# Patient Record
Sex: FEMALE | Race: ASIAN
[De-identification: names, ages, dates, MRNs, and addresses within clinical notes are randomized per-mention and may not be internally consistent; named-entity substitution may affect disease eponyms.]

---

## 2019-03-09 ENCOUNTER — HOSPITAL ENCOUNTER (EMERGENCY)
Dept: HOSPITAL 8 - ED | Age: 54
Discharge: HOME | End: 2019-03-09
Payer: SELF-PAY

## 2019-03-09 VITALS — WEIGHT: 156.75 LBS | HEIGHT: 65 IN | BODY MASS INDEX: 26.12 KG/M2

## 2019-03-09 VITALS — SYSTOLIC BLOOD PRESSURE: 153 MMHG | DIASTOLIC BLOOD PRESSURE: 80 MMHG

## 2019-03-09 DIAGNOSIS — R05: Primary | ICD-10-CM

## 2019-03-09 PROCEDURE — 99283 EMERGENCY DEPT VISIT LOW MDM: CPT

## 2019-03-09 PROCEDURE — 71046 X-RAY EXAM CHEST 2 VIEWS: CPT

## 2019-06-30 ENCOUNTER — HOSPITAL ENCOUNTER (EMERGENCY)
Dept: HOSPITAL 8 - ED | Age: 54
Discharge: HOME | End: 2019-06-30
Payer: COMMERCIAL

## 2019-06-30 VITALS — DIASTOLIC BLOOD PRESSURE: 77 MMHG | SYSTOLIC BLOOD PRESSURE: 153 MMHG

## 2019-06-30 VITALS — HEIGHT: 65 IN | BODY MASS INDEX: 24.24 KG/M2 | WEIGHT: 145.51 LBS

## 2019-06-30 DIAGNOSIS — R11.10: ICD-10-CM

## 2019-06-30 DIAGNOSIS — H81.10: ICD-10-CM

## 2019-06-30 DIAGNOSIS — H81.399: Primary | ICD-10-CM

## 2019-06-30 DIAGNOSIS — R05: ICD-10-CM

## 2019-06-30 LAB
ALBUMIN SERPL-MCNC: 3.6 G/DL (ref 3.4–5)
ALP SERPL-CCNC: 70 U/L (ref 45–117)
ALT SERPL-CCNC: 17 U/L (ref 12–78)
ANION GAP SERPL CALC-SCNC: 6 MMOL/L (ref 5–15)
BASOPHILS # BLD AUTO: 0.01 X10^3/UL (ref 0–0.1)
BASOPHILS NFR BLD AUTO: 0 % (ref 0–1)
BILIRUB SERPL-MCNC: 0.3 MG/DL (ref 0.2–1)
CALCIUM SERPL-MCNC: 8.3 MG/DL (ref 8.5–10.1)
CHLORIDE SERPL-SCNC: 111 MMOL/L (ref 98–107)
CREAT SERPL-MCNC: 0.91 MG/DL (ref 0.55–1.02)
EOSINOPHIL # BLD AUTO: 0.13 X10^3/UL (ref 0–0.4)
EOSINOPHIL NFR BLD AUTO: 3 % (ref 1–7)
ERYTHROCYTE [DISTWIDTH] IN BLOOD BY AUTOMATED COUNT: 13.5 % (ref 9.6–15.2)
LYMPHOCYTES # BLD AUTO: 1.44 X10^3/UL (ref 1–3.4)
LYMPHOCYTES NFR BLD AUTO: 37 % (ref 22–44)
MCH RBC QN AUTO: 29.3 PG (ref 27–34.8)
MCHC RBC AUTO-ENTMCNC: 33.6 G/DL (ref 32.4–35.8)
MCV RBC AUTO: 87.1 FL (ref 80–100)
MD: NO
MONOCYTES # BLD AUTO: 0.16 X10^3/UL (ref 0.2–0.8)
MONOCYTES NFR BLD AUTO: 4 % (ref 2–9)
NEUTROPHILS # BLD AUTO: 2.2 X10^3/UL (ref 1.8–6.8)
NEUTROPHILS NFR BLD AUTO: 56 % (ref 42–75)
PLATELET # BLD AUTO: 240 X10^3/UL (ref 130–400)
PMV BLD AUTO: 8 FL (ref 7.4–10.4)
PROT SERPL-MCNC: 7.9 G/DL (ref 6.4–8.2)
RBC # BLD AUTO: 5.45 X10^6/UL (ref 3.82–5.3)

## 2019-06-30 PROCEDURE — 85025 COMPLETE CBC W/AUTO DIFF WBC: CPT

## 2019-06-30 PROCEDURE — 93005 ELECTROCARDIOGRAM TRACING: CPT

## 2019-06-30 PROCEDURE — 71045 X-RAY EXAM CHEST 1 VIEW: CPT

## 2019-06-30 PROCEDURE — 99284 EMERGENCY DEPT VISIT MOD MDM: CPT

## 2019-06-30 PROCEDURE — 36415 COLL VENOUS BLD VENIPUNCTURE: CPT

## 2019-06-30 PROCEDURE — 80053 COMPREHEN METABOLIC PANEL: CPT

## 2019-06-30 PROCEDURE — 83690 ASSAY OF LIPASE: CPT

## 2019-06-30 NOTE — NUR
REPORT FROM SHEA HELM.

YULIA PRETTY. C/.O NASAL CONGESTION, PRODUCTIVE COUGH FOR ONE WEEK NOW HA AT WORK 
TODAY

APPEARS WELL/CLEAR LUNGS

UPDATED ON ESTIMATE

## 2019-06-30 NOTE — NUR
With reassessment patient reports "i feel way less dizzy." Able to rapidly move 
head/body with limited dizzinessLab at bedside to draw labs

## 2019-06-30 NOTE — NUR
Medicated per emar

 Up to restroom to void-no difficulty

Mild wet cough noted

 Vitals updated-wnl

## 2020-05-26 ENCOUNTER — HOSPITAL ENCOUNTER (OUTPATIENT)
Dept: LAB | Facility: MEDICAL CENTER | Age: 55
End: 2020-05-26
Attending: PHYSICIAN ASSISTANT
Payer: COMMERCIAL

## 2020-05-26 LAB
ALBUMIN SERPL BCP-MCNC: 4.3 G/DL (ref 3.2–4.9)
ALBUMIN/GLOB SERPL: 1.2 G/DL
ALP SERPL-CCNC: 77 U/L (ref 30–99)
ALT SERPL-CCNC: 12 U/L (ref 2–50)
ANION GAP SERPL CALC-SCNC: 11 MMOL/L (ref 7–16)
AST SERPL-CCNC: 14 U/L (ref 12–45)
BASOPHILS # BLD AUTO: 0.6 % (ref 0–1.8)
BASOPHILS # BLD: 0.04 K/UL (ref 0–0.12)
BILIRUB SERPL-MCNC: 0.5 MG/DL (ref 0.1–1.5)
BUN SERPL-MCNC: 12 MG/DL (ref 8–22)
CALCIUM SERPL-MCNC: 9 MG/DL (ref 8.5–10.5)
CHLORIDE SERPL-SCNC: 104 MMOL/L (ref 96–112)
CHOLEST SERPL-MCNC: 215 MG/DL (ref 100–199)
CO2 SERPL-SCNC: 21 MMOL/L (ref 20–33)
CREAT SERPL-MCNC: 0.87 MG/DL (ref 0.5–1.4)
EOSINOPHIL # BLD AUTO: 0.33 K/UL (ref 0–0.51)
EOSINOPHIL NFR BLD: 5.1 % (ref 0–6.9)
ERYTHROCYTE [DISTWIDTH] IN BLOOD BY AUTOMATED COUNT: 41.1 FL (ref 35.9–50)
FASTING STATUS PATIENT QL REPORTED: NORMAL
GLOBULIN SER CALC-MCNC: 3.5 G/DL (ref 1.9–3.5)
GLUCOSE SERPL-MCNC: 94 MG/DL (ref 65–99)
HCT VFR BLD AUTO: 47 % (ref 37–47)
HDLC SERPL-MCNC: 34 MG/DL
HGB BLD-MCNC: 15.4 G/DL (ref 12–16)
IMM GRANULOCYTES # BLD AUTO: 0.02 K/UL (ref 0–0.11)
IMM GRANULOCYTES NFR BLD AUTO: 0.3 % (ref 0–0.9)
LDLC SERPL CALC-MCNC: 120 MG/DL
LYMPHOCYTES # BLD AUTO: 2.22 K/UL (ref 1–4.8)
LYMPHOCYTES NFR BLD: 34.4 % (ref 22–41)
MCH RBC QN AUTO: 28.5 PG (ref 27–33)
MCHC RBC AUTO-ENTMCNC: 32.8 G/DL (ref 33.6–35)
MCV RBC AUTO: 86.9 FL (ref 81.4–97.8)
MONOCYTES # BLD AUTO: 0.41 K/UL (ref 0–0.85)
MONOCYTES NFR BLD AUTO: 6.4 % (ref 0–13.4)
NEUTROPHILS # BLD AUTO: 3.43 K/UL (ref 2–7.15)
NEUTROPHILS NFR BLD: 53.2 % (ref 44–72)
NRBC # BLD AUTO: 0 K/UL
NRBC BLD-RTO: 0 /100 WBC
PLATELET # BLD AUTO: 299 K/UL (ref 164–446)
PMV BLD AUTO: 9.7 FL (ref 9–12.9)
POTASSIUM SERPL-SCNC: 4 MMOL/L (ref 3.6–5.5)
PROT SERPL-MCNC: 7.8 G/DL (ref 6–8.2)
RBC # BLD AUTO: 5.41 M/UL (ref 4.2–5.4)
SODIUM SERPL-SCNC: 136 MMOL/L (ref 135–145)
T4 FREE SERPL-MCNC: 1.07 NG/DL (ref 0.93–1.7)
TRIGL SERPL-MCNC: 305 MG/DL (ref 0–149)
TSH SERPL DL<=0.005 MIU/L-ACNC: 2.12 UIU/ML (ref 0.38–5.33)
WBC # BLD AUTO: 6.5 K/UL (ref 4.8–10.8)

## 2020-05-26 PROCEDURE — 36415 COLL VENOUS BLD VENIPUNCTURE: CPT

## 2020-05-26 PROCEDURE — 85025 COMPLETE CBC W/AUTO DIFF WBC: CPT

## 2020-05-26 PROCEDURE — 84443 ASSAY THYROID STIM HORMONE: CPT

## 2020-05-26 PROCEDURE — 80053 COMPREHEN METABOLIC PANEL: CPT

## 2020-05-26 PROCEDURE — 84439 ASSAY OF FREE THYROXINE: CPT

## 2020-05-26 PROCEDURE — 83036 HEMOGLOBIN GLYCOSYLATED A1C: CPT

## 2020-05-26 PROCEDURE — 80061 LIPID PANEL: CPT

## 2020-05-27 LAB
EST. AVERAGE GLUCOSE BLD GHB EST-MCNC: 123 MG/DL
HBA1C MFR BLD: 5.9 % (ref 0–5.6)

## 2020-05-28 ENCOUNTER — OFFICE VISIT (OUTPATIENT)
Dept: CARDIOLOGY | Facility: MEDICAL CENTER | Age: 55
End: 2020-05-28
Payer: COMMERCIAL

## 2020-05-28 VITALS
DIASTOLIC BLOOD PRESSURE: 56 MMHG | WEIGHT: 149 LBS | HEIGHT: 65 IN | HEART RATE: 76 BPM | SYSTOLIC BLOOD PRESSURE: 123 MMHG | BODY MASS INDEX: 24.83 KG/M2 | OXYGEN SATURATION: 99 %

## 2020-05-28 DIAGNOSIS — I10 ESSENTIAL HYPERTENSION, BENIGN: ICD-10-CM

## 2020-05-28 DIAGNOSIS — R00.2 PALPITATIONS: ICD-10-CM

## 2020-05-28 DIAGNOSIS — I45.10 RBBB: ICD-10-CM

## 2020-05-28 LAB — EKG IMPRESSION: NORMAL

## 2020-05-28 PROCEDURE — 99204 OFFICE O/P NEW MOD 45 MIN: CPT | Performed by: INTERNAL MEDICINE

## 2020-05-28 PROCEDURE — 93000 ELECTROCARDIOGRAM COMPLETE: CPT | Performed by: INTERNAL MEDICINE

## 2020-05-28 RX ORDER — TELMISARTAN 40 MG/1
40 TABLET ORAL DAILY
Qty: 90 TAB | Refills: 3 | Status: SHIPPED | OUTPATIENT
Start: 2020-05-28 | End: 2021-04-16 | Stop reason: SDUPTHER

## 2020-05-28 RX ORDER — AMLODIPINE BESYLATE 5 MG/1
5 TABLET ORAL DAILY
Qty: 90 TAB | Refills: 3 | Status: SHIPPED | OUTPATIENT
Start: 2020-05-28 | End: 2020-05-28 | Stop reason: SDUPTHER

## 2020-05-28 RX ORDER — TELMISARTAN 40 MG/1
40 TABLET ORAL DAILY
Qty: 90 TAB | Refills: 3 | Status: SHIPPED | OUTPATIENT
Start: 2020-05-28 | End: 2020-05-28 | Stop reason: SDUPTHER

## 2020-05-28 RX ORDER — TELMISARTAN AND AMLODIPINE 5; 40 MG/1; MG/1
TABLET ORAL DAILY
COMMUNITY
End: 2020-05-28

## 2020-05-28 RX ORDER — AMLODIPINE BESYLATE 5 MG/1
5 TABLET ORAL DAILY
Qty: 90 TAB | Refills: 3 | Status: SHIPPED | OUTPATIENT
Start: 2020-05-28 | End: 2021-04-16 | Stop reason: SDUPTHER

## 2020-05-28 ASSESSMENT — ENCOUNTER SYMPTOMS
GASTROINTESTINAL NEGATIVE: 1
MUSCULOSKELETAL NEGATIVE: 1
DOUBLE VISION: 0
CONSTITUTIONAL NEGATIVE: 1
DIZZINESS: 0
BLURRED VISION: 0
PALPITATIONS: 1
BRUISES/BLEEDS EASILY: 0
SHORTNESS OF BREATH: 0
INSOMNIA: 0
NERVOUS/ANXIOUS: 0

## 2020-05-28 ASSESSMENT — FIBROSIS 4 INDEX: FIB4 SCORE: 0.72

## 2020-05-28 NOTE — PROGRESS NOTES
Chief Complaint   Patient presents with   • Abnormal EKG     new patient   Palpitations    Subjective:   Jennifer Saleh is a 54 y.o. female who presents today to establish care for her hypertension for evaluation of abnormal EKG and palpitations    She is seen as a new patient.  She is poor historian and does not speaks good English.  Her sister mostly answered the questions for her.  She has history of HTN and heart murmur.  She was hospitalized in [facility] in 2018 for a week for headache  and was found to have HTN.   She has been taking telmisartan amlodipine (40/5 mg) daily  Her BP has been in the range  She is able to work in the kitchen.    Described palpitation as a fast beat  Denies recent changes in cardiac symptoms.    Denies frequent strep throat as a child but probably has allergy/ frequent URI symptoms.      Past Medical History:   Diagnosis Date   • Heart murmur    • Hypertension    • Palpitations      History reviewed. No pertinent surgical history.  Family History   Problem Relation Age of Onset   • Heart Disease Mother    • Heart Disease Father    • Heart Disease, need heart transplant  One of 8 sibling Brother      Social History     Socioeconomic History   • Marital status:      Spouse name: Not on file   • Number of children: Not on file   • Years of education: Not on file   • Highest education level: Not on file   Occupational History   • Not on file   Social Needs   • Financial resource strain: Not on file   • Food insecurity     Worry: Not on file     Inability: Not on file   • Transportation needs     Medical: Not on file     Non-medical: Not on file   Tobacco Use   • Smoking status: Never Smoker   • Smokeless tobacco: Never Used   Substance and Sexual Activity   • Alcohol use: Not on file   • Drug use: Not on file   • Sexual activity: Not on file   Lifestyle   • Physical activity     Days per week: Not on file     Minutes per session: Not on file   • Stress: Not on file  "  Relationships   • Social connections     Talks on phone: Not on file     Gets together: Not on file     Attends Caodaism service: Not on file     Active member of club or organization: Not on file     Attends meetings of clubs or organizations: Not on file     Relationship status: Not on file   • Intimate partner violence     Fear of current or ex partner: Not on file     Emotionally abused: Not on file     Physically abused: Not on file     Forced sexual activity: Not on file   Other Topics Concern   • Not on file   Social History Narrative   • Not on file     Not on File  Outpatient Encounter Medications as of 5/28/2020   Medication Sig Dispense Refill   • amLODIPine (NORVASC) 5 MG Tab Take 1 Tab by mouth every day. 90 Tab 3   • telmisartan (MICARDIS) 40 MG Tab Take 1 Tab by mouth every day. 90 Tab 3   • [DISCONTINUED] Telmisartan-amLODIPine 40-5 MG Tab Take  by mouth every day.     • [DISCONTINUED] telmisartan (MICARDIS) 40 MG Tab Take 1 Tab by mouth every day. 90 Tab 3   • [DISCONTINUED] amLODIPine (NORVASC) 5 MG Tab Take 1 Tab by mouth every day. 90 Tab 3     No facility-administered encounter medications on file as of 5/28/2020.      Review of Systems   Constitutional: Negative.    HENT: Positive for congestion.    Eyes: Negative for blurred vision and double vision.   Respiratory: Negative for shortness of breath.    Cardiovascular: Positive for palpitations. Negative for chest pain.        Fast, infrequent   Gastrointestinal: Negative.    Genitourinary: Negative.    Musculoskeletal: Negative.    Neurological: Negative for dizziness.   Endo/Heme/Allergies: Negative for environmental allergies. Does not bruise/bleed easily.   Psychiatric/Behavioral: The patient is not nervous/anxious and does not have insomnia.    All other systems reviewed and are negative.       Objective:   /56 (BP Location: Left arm, Patient Position: Sitting)   Pulse 76   Ht 1.651 m (5' 5\")   Wt 67.6 kg (149 lb)   SpO2 99%   " BMI 24.79 kg/m²     Physical Exam   Constitutional: She is oriented to person, place, and time. She appears well-developed and well-nourished.   Neck: No JVD present.   Cardiovascular: Normal rate and regular rhythm. Exam reveals no gallop.   No murmur heard.  Pulmonary/Chest: Effort normal and breath sounds normal. No respiratory distress. She has no wheezes. She has no rales.   Abdominal: Soft. She exhibits no distension. There is no abdominal tenderness.   Musculoskeletal:         General: No edema.   Neurological: She is alert and oriented to person, place, and time.   Skin: Skin is warm and dry. No erythema.   Psychiatric: She has a normal mood and affect. Her behavior is normal.     Labs 2 days ago CMP normal   Hb A1c 5.9    EKG today by my review showed sinus rhythm with right bundle branch block    Assessment:     1. Palpitations  EKG    EC-ECHOCARDIOGRAM COMPLETE W/ CONT   2. Essential hypertension, benign  EC-ECHOCARDIOGRAM COMPLETE W/ CONT   3. RBBB  EC-ECHOCARDIOGRAM COMPLETE W/ CONT       Medical Decision Making:  Today's Assessment / Status / Plan:     Palpitation is infrequent may be some type of SVT.  Right bundle branch block may be due to pulmonary disease.  Will obtain echocardiography to rule out diagnosed left heart disease particularly mitral valve issue.  Blood pressure is well controlled on current medication we will refill her medication for her.  If her co-pay is high, will switch telmisartan to losartan.  See her back in about 6 months for follow-up.  We will keep you posted about our findings and further recommendations as they become available. Please also do not hesitate to call for any questions.  Thank you kindly for allowing me to participate in the care of this patient.

## 2020-06-09 ENCOUNTER — TELEPHONE (OUTPATIENT)
Dept: CARDIOLOGY | Facility: MEDICAL CENTER | Age: 55
End: 2020-06-09

## 2020-06-09 ENCOUNTER — HOSPITAL ENCOUNTER (OUTPATIENT)
Dept: CARDIOLOGY | Facility: MEDICAL CENTER | Age: 55
End: 2020-06-09
Attending: INTERNAL MEDICINE
Payer: COMMERCIAL

## 2020-06-09 DIAGNOSIS — R00.2 PALPITATIONS: ICD-10-CM

## 2020-06-09 DIAGNOSIS — I10 ESSENTIAL HYPERTENSION, BENIGN: ICD-10-CM

## 2020-06-09 DIAGNOSIS — I45.10 RBBB: ICD-10-CM

## 2020-06-09 LAB
LV EJECT FRACT  99904: 65
LV EJECT FRACT MOD 2C 99903: 47.62
LV EJECT FRACT MOD 4C 99902: 56.87
LV EJECT FRACT MOD BP 99901: 54.48

## 2020-06-09 PROCEDURE — 93306 TTE W/DOPPLER COMPLETE: CPT

## 2020-06-09 PROCEDURE — 93306 TTE W/DOPPLER COMPLETE: CPT | Mod: 26 | Performed by: INTERNAL MEDICINE

## 2020-06-09 NOTE — TELEPHONE ENCOUNTER
----- Message from Sammie Zabala L.P.N. sent at 6/9/2020 11:33 AM PDT -----    ----- Message -----  From: González Welch M.D.  Sent: 6/9/2020  11:27 AM PDT  To: ANNETTE Cortes NL  ----- Message -----  From: Mar Jacobs R.N.  Sent: 6/9/2020   9:09 AM PDT  To: González Welch M.D.    FV scheduled with you 11/19/2020, thank you!

## 2021-04-16 ENCOUNTER — TELEPHONE (OUTPATIENT)
Dept: CARDIOLOGY | Facility: MEDICAL CENTER | Age: 56
End: 2021-04-16

## 2021-04-16 DIAGNOSIS — R00.2 PALPITATIONS: ICD-10-CM

## 2021-04-16 DIAGNOSIS — Z00.00 ANNUAL PHYSICAL EXAM: ICD-10-CM

## 2021-04-16 DIAGNOSIS — I10 ESSENTIAL HYPERTENSION, BENIGN: ICD-10-CM

## 2021-04-16 RX ORDER — TELMISARTAN 40 MG/1
40 TABLET ORAL DAILY
Qty: 90 TABLET | Refills: 0 | Status: SHIPPED | OUTPATIENT
Start: 2021-04-16 | End: 2021-07-20 | Stop reason: SDUPTHER

## 2021-04-16 RX ORDER — AMLODIPINE BESYLATE 5 MG/1
5 TABLET ORAL DAILY
Qty: 90 TABLET | Refills: 0 | Status: SHIPPED | OUTPATIENT
Start: 2021-04-16 | End: 2021-07-20 | Stop reason: SDUPTHER

## 2021-04-16 NOTE — TELEPHONE ENCOUNTER
CR    Pt needs 90 day refills of amlodipine and telmisartan sent to [facility] on [address]. Pt is out of medication.     Thank you

## 2021-05-14 ENCOUNTER — HOSPITAL ENCOUNTER (OUTPATIENT)
Dept: LAB | Facility: MEDICAL CENTER | Age: 56
End: 2021-05-14
Attending: STUDENT IN AN ORGANIZED HEALTH CARE EDUCATION/TRAINING PROGRAM
Payer: COMMERCIAL

## 2021-05-14 LAB
ALBUMIN SERPL BCP-MCNC: 4.2 G/DL (ref 3.2–4.9)
ALBUMIN/GLOB SERPL: 1.1 G/DL
ALP SERPL-CCNC: 75 U/L (ref 30–99)
ALT SERPL-CCNC: 14 U/L (ref 2–50)
ANION GAP SERPL CALC-SCNC: 9 MMOL/L (ref 7–16)
AST SERPL-CCNC: 15 U/L (ref 12–45)
BASOPHILS # BLD AUTO: 0.6 % (ref 0–1.8)
BASOPHILS # BLD: 0.04 K/UL (ref 0–0.12)
BILIRUB SERPL-MCNC: 0.3 MG/DL (ref 0.1–1.5)
BUN SERPL-MCNC: 13 MG/DL (ref 8–22)
CALCIUM SERPL-MCNC: 9 MG/DL (ref 8.5–10.5)
CHLORIDE SERPL-SCNC: 105 MMOL/L (ref 96–112)
CHOLEST SERPL-MCNC: 238 MG/DL (ref 100–199)
CO2 SERPL-SCNC: 24 MMOL/L (ref 20–33)
CREAT SERPL-MCNC: 0.92 MG/DL (ref 0.5–1.4)
EOSINOPHIL # BLD AUTO: 0.25 K/UL (ref 0–0.51)
EOSINOPHIL NFR BLD: 3.8 % (ref 0–6.9)
ERYTHROCYTE [DISTWIDTH] IN BLOOD BY AUTOMATED COUNT: 41.1 FL (ref 35.9–50)
EST. AVERAGE GLUCOSE BLD GHB EST-MCNC: 134 MG/DL
FASTING STATUS PATIENT QL REPORTED: NORMAL
GLOBULIN SER CALC-MCNC: 3.8 G/DL (ref 1.9–3.5)
GLUCOSE SERPL-MCNC: 109 MG/DL (ref 65–99)
HBA1C MFR BLD: 6.3 % (ref 4–5.6)
HBV CORE AB SERPL QL IA: NONREACTIVE
HBV SURFACE AB SERPL IA-ACNC: <3.5 MIU/ML (ref 0–10)
HCT VFR BLD AUTO: 45.3 % (ref 37–47)
HDLC SERPL-MCNC: 42 MG/DL
HGB BLD-MCNC: 15.1 G/DL (ref 12–16)
HIV 1+2 AB+HIV1 P24 AG SERPL QL IA: NORMAL
IMM GRANULOCYTES # BLD AUTO: 0.02 K/UL (ref 0–0.11)
IMM GRANULOCYTES NFR BLD AUTO: 0.3 % (ref 0–0.9)
LDLC SERPL CALC-MCNC: 167 MG/DL
LYMPHOCYTES # BLD AUTO: 2.33 K/UL (ref 1–4.8)
LYMPHOCYTES NFR BLD: 35.4 % (ref 22–41)
MCH RBC QN AUTO: 28 PG (ref 27–33)
MCHC RBC AUTO-ENTMCNC: 33.3 G/DL (ref 33.6–35)
MCV RBC AUTO: 83.9 FL (ref 81.4–97.8)
MONOCYTES # BLD AUTO: 0.44 K/UL (ref 0–0.85)
MONOCYTES NFR BLD AUTO: 6.7 % (ref 0–13.4)
NEUTROPHILS # BLD AUTO: 3.51 K/UL (ref 2–7.15)
NEUTROPHILS NFR BLD: 53.2 % (ref 44–72)
NRBC # BLD AUTO: 0 K/UL
NRBC BLD-RTO: 0 /100 WBC
PLATELET # BLD AUTO: 324 K/UL (ref 164–446)
PMV BLD AUTO: 9.2 FL (ref 9–12.9)
POTASSIUM SERPL-SCNC: 4.3 MMOL/L (ref 3.6–5.5)
PROT SERPL-MCNC: 8 G/DL (ref 6–8.2)
RBC # BLD AUTO: 5.4 M/UL (ref 4.2–5.4)
SODIUM SERPL-SCNC: 138 MMOL/L (ref 135–145)
TRIGL SERPL-MCNC: 147 MG/DL (ref 0–149)
TSH SERPL DL<=0.005 MIU/L-ACNC: 1.69 UIU/ML (ref 0.38–5.33)
WBC # BLD AUTO: 6.6 K/UL (ref 4.8–10.8)

## 2021-05-14 PROCEDURE — 86803 HEPATITIS C AB TEST: CPT

## 2021-05-14 PROCEDURE — 84443 ASSAY THYROID STIM HORMONE: CPT

## 2021-05-14 PROCEDURE — 87340 HEPATITIS B SURFACE AG IA: CPT

## 2021-05-14 PROCEDURE — 83036 HEMOGLOBIN GLYCOSYLATED A1C: CPT

## 2021-05-14 PROCEDURE — 86706 HEP B SURFACE ANTIBODY: CPT

## 2021-05-14 PROCEDURE — 86694 HERPES SIMPLEX NES ANTBDY: CPT

## 2021-05-14 PROCEDURE — 80061 LIPID PANEL: CPT

## 2021-05-14 PROCEDURE — 36415 COLL VENOUS BLD VENIPUNCTURE: CPT

## 2021-05-14 PROCEDURE — 87389 HIV-1 AG W/HIV-1&-2 AB AG IA: CPT

## 2021-05-14 PROCEDURE — 86780 TREPONEMA PALLIDUM: CPT

## 2021-05-14 PROCEDURE — 85025 COMPLETE CBC W/AUTO DIFF WBC: CPT

## 2021-05-14 PROCEDURE — 80053 COMPREHEN METABOLIC PANEL: CPT

## 2021-05-14 PROCEDURE — 82306 VITAMIN D 25 HYDROXY: CPT

## 2021-05-14 PROCEDURE — 86704 HEP B CORE ANTIBODY TOTAL: CPT

## 2021-05-15 LAB
HBV SURFACE AG SER QL: NORMAL
HCV AB SER QL: NORMAL
TREPONEMA PALLIDUM IGG+IGM AB [PRESENCE] IN SERUM OR PLASMA BY IMMUNOASSAY: NORMAL

## 2021-05-16 LAB — 25(OH)D3 SERPL-MCNC: 14 NG/ML (ref 30–80)

## 2021-05-18 LAB — HSV1+2 IGG SER IA-ACNC: 0.97 IV

## 2021-07-20 ENCOUNTER — OFFICE VISIT (OUTPATIENT)
Dept: CARDIOLOGY | Facility: MEDICAL CENTER | Age: 56
End: 2021-07-20
Payer: COMMERCIAL

## 2021-07-20 VITALS
HEIGHT: 65 IN | BODY MASS INDEX: 22.81 KG/M2 | SYSTOLIC BLOOD PRESSURE: 182 MMHG | WEIGHT: 136.9 LBS | DIASTOLIC BLOOD PRESSURE: 90 MMHG | OXYGEN SATURATION: 98 % | HEART RATE: 77 BPM | RESPIRATION RATE: 12 BRPM

## 2021-07-20 DIAGNOSIS — I45.10 RBBB (RIGHT BUNDLE BRANCH BLOCK): ICD-10-CM

## 2021-07-20 DIAGNOSIS — R94.31 NONSPECIFIC ABNORMAL ELECTROCARDIOGRAM (ECG) (EKG): ICD-10-CM

## 2021-07-20 DIAGNOSIS — E78.5 DYSLIPIDEMIA: ICD-10-CM

## 2021-07-20 DIAGNOSIS — I10 ESSENTIAL HYPERTENSION, BENIGN: ICD-10-CM

## 2021-07-20 PROBLEM — R00.2 PALPITATIONS: Status: RESOLVED | Noted: 2020-05-28 | Resolved: 2021-07-20

## 2021-07-20 PROCEDURE — 99214 OFFICE O/P EST MOD 30 MIN: CPT | Performed by: INTERNAL MEDICINE

## 2021-07-20 RX ORDER — FLUTICASONE PROPIONATE 50 MCG
SPRAY, SUSPENSION (ML) NASAL
COMMUNITY
Start: 2021-06-03 | End: 2021-07-20

## 2021-07-20 RX ORDER — METFORMIN HYDROCHLORIDE 750 MG/1
750 TABLET, EXTENDED RELEASE ORAL DAILY
Qty: 30 TABLET | Refills: 0 | Status: SHIPPED | OUTPATIENT
Start: 2021-07-20 | End: 2022-05-13

## 2021-07-20 RX ORDER — METFORMIN HYDROCHLORIDE 750 MG/1
TABLET, EXTENDED RELEASE ORAL
COMMUNITY
Start: 2021-06-03 | End: 2021-07-20 | Stop reason: SDUPTHER

## 2021-07-20 RX ORDER — TELMISARTAN 40 MG/1
40 TABLET ORAL DAILY
Qty: 90 TABLET | Refills: 3 | Status: SHIPPED | OUTPATIENT
Start: 2021-07-20 | End: 2021-09-21 | Stop reason: SDUPTHER

## 2021-07-20 RX ORDER — ATORVASTATIN CALCIUM 40 MG/1
TABLET, FILM COATED ORAL
COMMUNITY
Start: 2021-06-03 | End: 2021-07-20 | Stop reason: SDUPTHER

## 2021-07-20 RX ORDER — ATORVASTATIN CALCIUM 40 MG/1
40 TABLET, FILM COATED ORAL DAILY
Qty: 90 TABLET | Refills: 3 | Status: SHIPPED | OUTPATIENT
Start: 2021-07-20 | End: 2022-02-23 | Stop reason: SDUPTHER

## 2021-07-20 RX ORDER — AMLODIPINE BESYLATE 5 MG/1
5 TABLET ORAL DAILY
Qty: 90 TABLET | Refills: 3 | Status: SHIPPED | OUTPATIENT
Start: 2021-07-20 | End: 2021-09-21 | Stop reason: SDUPTHER

## 2021-07-20 ASSESSMENT — ENCOUNTER SYMPTOMS
NEUROLOGICAL NEGATIVE: 1
DOUBLE VISION: 0
VOMITING: 0
ABDOMINAL PAIN: 0
FEVER: 0
NERVOUS/ANXIOUS: 0
CLAUDICATION: 0
BLURRED VISION: 0
RESPIRATORY NEGATIVE: 1
CONSTITUTIONAL NEGATIVE: 1
MYALGIAS: 0
GASTROINTESTINAL NEGATIVE: 1
CHILLS: 0
MUSCULOSKELETAL NEGATIVE: 1
HEADACHES: 0
FOCAL WEAKNESS: 0
PALPITATIONS: 0
COUGH: 0
SHORTNESS OF BREATH: 0
WEAKNESS: 0
WEIGHT LOSS: 0
PSYCHIATRIC NEGATIVE: 1
CARDIOVASCULAR NEGATIVE: 1
DEPRESSION: 0
BRUISES/BLEEDS EASILY: 0
EYES NEGATIVE: 1
DIZZINESS: 0
NAUSEA: 0

## 2021-07-20 ASSESSMENT — FIBROSIS 4 INDEX: FIB4 SCORE: 0.67

## 2021-07-20 NOTE — PROGRESS NOTES
Chief Complaint   Patient presents with   • Hypertension     F/V Dx: Essential hypertension, benign   • Palpitations       Subjective:   Rosario Imelda Apellanes Enerio is a 55 y.o. female who presents today for follow up of hypertension and hyperlipidemia, abnormal EKG    Since the patient's last visit on 05/28/20 with González Levi, she has been doing well clinically. She denies chest pain, shortness of breath, palpitations, nausea/vomiting or diaphoresis. She has been out of her medications for 2 months.    Past Medical History:   Diagnosis Date   • Heart murmur    • Hypertension    • Palpitations      History reviewed. No pertinent surgical history.  Family History   Problem Relation Age of Onset   • Heart Disease Mother    • Heart Disease Father    • Heart Disease Brother      Social History     Socioeconomic History   • Marital status:      Spouse name: Not on file   • Number of children: Not on file   • Years of education: Not on file   • Highest education level: Not on file   Occupational History   • Not on file   Tobacco Use   • Smoking status: Never Smoker   • Smokeless tobacco: Never Used   Substance and Sexual Activity   • Alcohol use: Never   • Drug use: Not on file   • Sexual activity: Not on file   Other Topics Concern   • Not on file   Social History Narrative   • Not on file     Social Determinants of Health     Financial Resource Strain:    • Difficulty of Paying Living Expenses:    Food Insecurity:    • Worried About Running Out of Food in the Last Year:    • Ran Out of Food in the Last Year:    Transportation Needs:    • Lack of Transportation (Medical):    • Lack of Transportation (Non-Medical):    Physical Activity:    • Days of Exercise per Week:    • Minutes of Exercise per Session:    Stress:    • Feeling of Stress :    Social Connections:    • Frequency of Communication with Friends and Family:    • Frequency of Social Gatherings with Friends and Family:    • Attends  Protestant Services:    • Active Member of Clubs or Organizations:    • Attends Club or Organization Meetings:    • Marital Status:    Intimate Partner Violence:    • Fear of Current or Ex-Partner:    • Emotionally Abused:    • Physically Abused:    • Sexually Abused:      Not on File     (Medications reviewed.)  Outpatient Encounter Medications as of 7/20/2021   Medication Sig Dispense Refill   • atorvastatin (LIPITOR) 40 MG Tab Take 1 tablet by mouth every day. Take 1 tablet by mouth every night at bedtime 90 tablet 3   • telmisartan (MICARDIS) 40 MG Tab Take 1 tablet by mouth every day. NEEDS APPT 90 tablet 3   • amLODIPine (NORVASC) 5 MG Tab Take 1 tablet by mouth every day. NEEDS APPT 90 tablet 3   • metFORMIN ER (GLUCOPHAGE XR) 750 MG TABLET SR 24 HR Take 1 tablet by mouth every day. 30 tablet 0   • [DISCONTINUED] atorvastatin (LIPITOR) 40 MG Tab Take  by mouth. Take 1 tablet by mouth every night at bedtime     • [DISCONTINUED] fluticasone (FLONASE) 50 MCG/ACT nasal spray INHALE 2 SPRAYS INTO EACH NOSTRIL IN THE MORNING (Patient not taking: Reported on 7/20/2021)     • [DISCONTINUED] metFORMIN ER (GLUCOPHAGE XR) 750 MG TABLET SR 24 HR TAKE 1 TABLET BY MOUTH TWICE A DAY     • [DISCONTINUED] telmisartan (MICARDIS) 40 MG Tab Take 1 tablet by mouth every day. NEEDS APPT 90 tablet 0   • [DISCONTINUED] amLODIPine (NORVASC) 5 MG Tab Take 1 tablet by mouth every day. NEEDS APPT 90 tablet 0     No facility-administered encounter medications on file as of 7/20/2021.     Review of Systems   Constitutional: Negative.  Negative for chills, fever, malaise/fatigue and weight loss.   HENT: Negative.  Negative for hearing loss.    Eyes: Negative.  Negative for blurred vision and double vision.   Respiratory: Negative.  Negative for cough and shortness of breath.    Cardiovascular: Negative.  Negative for chest pain, palpitations, claudication and leg swelling.   Gastrointestinal: Negative.  Negative for abdominal pain, nausea  "and vomiting.   Genitourinary: Negative.  Negative for dysuria and urgency.   Musculoskeletal: Negative.  Negative for joint pain and myalgias.   Skin: Negative.  Negative for itching and rash.   Neurological: Negative.  Negative for dizziness, focal weakness, weakness and headaches.   Endo/Heme/Allergies: Negative.  Does not bruise/bleed easily.   Psychiatric/Behavioral: Negative.  Negative for depression. The patient is not nervous/anxious.         Objective:   BP (!) 182/90 (BP Location: Left arm, Patient Position: Sitting, BP Cuff Size: Adult)   Pulse 77   Resp 12   Ht 1.651 m (5' 5\")   Wt 62.1 kg (136 lb 14.4 oz)   SpO2 98%   BMI 22.78 kg/m²     Physical Exam   Constitutional: She is oriented to person, place, and time. She appears well-developed.   HENT:   Head: Normocephalic and atraumatic.   Neck: No JVD present.   Cardiovascular: Normal rate, regular rhythm and normal heart sounds.   Pulmonary/Chest: Effort normal and breath sounds normal.   Abdominal: Soft. Bowel sounds are normal.   No hepatosplenomegaly.   Musculoskeletal:         General: Normal range of motion.   Lymphadenopathy:     She has no cervical adenopathy.   Neurological: She is alert and oriented to person, place, and time.   Skin: Skin is warm and dry.     CARDIAC STUDIES/PROCEDURES:    ECHOCARDIOGRAM CONCLUSIONS (06/09/20)  No prior study is available for comparison.   Normal transthoracic echocardiogram.   (study result reviewed)    EKG performed on (05/28/20) was reviewed: EKG personally interpreted shows sinus rhythm with right bundle branch block.    Laboratory results of (05/14/21) were reviewed. Cholesterol profile of 238/147/42/167 mg/dL noted.    Assessment:     1. Essential hypertension, benign     2. Dyslipidemia     3. Nonspecific abnormal electrocardiogram (ECG) (EKG)     4. RBBB (right bundle branch block)         Medical Decision Making:  Today's Assessment / Status / Plan:     1. Hypertension: Blood pressure has been " high. We will restart telmisartan and amlodipine and reassess the blood pressure.  2. Hyperlipidemia: We will restart statin therapy.  3. Abnormal EKG with right bundle branch block: The EKG is abnormal as described above. We will perform myocardial perfusion imaging study.  4. Diabetes mellitus: We refilled her metformin once and defer to her primary care physician.

## 2021-08-24 ENCOUNTER — HOSPITAL ENCOUNTER (OUTPATIENT)
Dept: LAB | Facility: MEDICAL CENTER | Age: 56
End: 2021-08-24
Attending: STUDENT IN AN ORGANIZED HEALTH CARE EDUCATION/TRAINING PROGRAM
Payer: COMMERCIAL

## 2021-08-24 LAB
ALBUMIN SERPL BCP-MCNC: 4.2 G/DL (ref 3.2–4.9)
ALBUMIN/GLOB SERPL: 1.2 G/DL
ALP SERPL-CCNC: 68 U/L (ref 30–99)
ALT SERPL-CCNC: 12 U/L (ref 2–50)
ANION GAP SERPL CALC-SCNC: 11 MMOL/L (ref 7–16)
AST SERPL-CCNC: 16 U/L (ref 12–45)
BILIRUB SERPL-MCNC: 0.3 MG/DL (ref 0.1–1.5)
BUN SERPL-MCNC: 13 MG/DL (ref 8–22)
CALCIUM SERPL-MCNC: 9.2 MG/DL (ref 8.5–10.5)
CHLORIDE SERPL-SCNC: 103 MMOL/L (ref 96–112)
CHOLEST SERPL-MCNC: 144 MG/DL (ref 100–199)
CO2 SERPL-SCNC: 25 MMOL/L (ref 20–33)
CREAT SERPL-MCNC: 0.82 MG/DL (ref 0.5–1.4)
EST. AVERAGE GLUCOSE BLD GHB EST-MCNC: 128 MG/DL
GLOBULIN SER CALC-MCNC: 3.4 G/DL (ref 1.9–3.5)
GLUCOSE SERPL-MCNC: 80 MG/DL (ref 65–99)
HBA1C MFR BLD: 6.1 % (ref 4–5.6)
HDLC SERPL-MCNC: 44 MG/DL
LDLC SERPL CALC-MCNC: 86 MG/DL
POTASSIUM SERPL-SCNC: 4.1 MMOL/L (ref 3.6–5.5)
PROT SERPL-MCNC: 7.6 G/DL (ref 6–8.2)
SODIUM SERPL-SCNC: 139 MMOL/L (ref 135–145)
TRIGL SERPL-MCNC: 70 MG/DL (ref 0–149)

## 2021-08-24 PROCEDURE — 80061 LIPID PANEL: CPT

## 2021-08-24 PROCEDURE — 36415 COLL VENOUS BLD VENIPUNCTURE: CPT

## 2021-08-24 PROCEDURE — 83036 HEMOGLOBIN GLYCOSYLATED A1C: CPT

## 2021-08-24 PROCEDURE — 80053 COMPREHEN METABOLIC PANEL: CPT

## 2021-09-15 NOTE — PROGRESS NOTES
"      Cardiology Clinic Follow-up Note    Date of note:    9/21/2021  Primary Care Provider: SPENCER Lisa    Name:             Rosario Imelda Apellanes Enerio  YOB: 1965  MRN:               [id number]    CC: htn    Primary Cardiologist: Dr. Javier    Patient HPI:   Jennifer Saleh is a 55 y.o. female with PMH including Essential hypertension, Dyslipidemia, palpitations, DM2.    Interim History:  Ms. Saleh was last seen in this cardiology office by Dr. Javier on 7/0/2021.  At that time her BP as in the 180s systolic.  She was restarted on telmisartan and amlodipine.     Her BP is improved.   Has not been checking at home, her monitor is broken.  Denies LE swelling, no CP or shortness of breath.   No palpitations  Has some trouble swallowing her metformin, the pills are so large.   Denies dizziness, (pre)syncope.     ROS   Negative other than what is outlined above.     Past medical history, social history and family history reviewed.  No changes other than what is outlined in HPI.    Current Outpatient Medications   Medication Sig Dispense Refill   • atorvastatin (LIPITOR) 40 MG Tab Take 1 tablet by mouth every day. Take 1 tablet by mouth every night at bedtime 90 tablet 3   • telmisartan (MICARDIS) 40 MG Tab Take 1 tablet by mouth every day. NEEDS APPT 90 tablet 3   • amLODIPine (NORVASC) 5 MG Tab Take 1 tablet by mouth every day. NEEDS APPT 90 tablet 3   • metFORMIN ER (GLUCOPHAGE XR) 750 MG TABLET SR 24 HR Take 1 tablet by mouth every day. 30 tablet 0     No current facility-administered medications for this visit.       Not on File      Physical Exam:  Ambulatory Vitals  /78 (BP Location: Left arm, Patient Position: Sitting, BP Cuff Size: Adult)   Pulse 83   Resp 14   Ht 1.651 m (5' 5\")   Wt 61.5 kg (135 lb 9.6 oz)   SpO2 97%    BP Readings from Last 4 Encounters:   09/21/21 130/78   07/20/21 (!) 182/90   05/28/20 123/56       Weight/BMI: Body mass index is 22.57 kg/m².  Wt " Readings from Last 4 Encounters:   21 61.5 kg (135 lb 9.6 oz)   21 62.1 kg (136 lb 14.4 oz)   20 67.6 kg (149 lb)       General: no apparent distress  Lungs: normal effort, without crackles, no wheezing or rhonchi.  Heart: normal rate, regular rhythm, no murmur, no rub  Ext:  no lower extremity edema.  Neurological: No focal deficits, no facial asymmetry.  Normal speech.  Psychiatric: Appropriate affect, alert and oriented x 3.   Skin: Warm extremities, no rash.      Lab Data Review:  Lab Results   Component Value Date/Time    CHOLSTRLTOT 144 2021 08:21 AM    LDL 86 2021 08:21 AM    HDL 44 2021 08:21 AM    TRIGLYCERIDE 70 2021 08:21 AM       Lab Results   Component Value Date/Time    SODIUM 139 2021 08:21 AM    POTASSIUM 4.1 2021 08:21 AM    CHLORIDE 103 2021 08:21 AM    CO2 25 2021 08:21 AM    GLUCOSE 80 2021 08:21 AM    BUN 13 2021 08:21 AM    CREATININE 0.82 2021 08:21 AM     Lab Results   Component Value Date/Time    ALKPHOSPHAT 68 2021 08:21 AM    ASTSGOT 16 2021 08:21 AM    ALTSGPT 12 2021 08:21 AM    TBILIRUBIN 0.3 2021 08:21 AM      Lab Results   Component Value Date/Time    WBC 6.6 2021 09:17 AM         Cardiac Imaging and Procedures Review:      Echo 2020:  CONCLUSIONS  No prior study is available for comparison.   Normal transthoracic echocardiogram.         Assessment and Clinical Decision Makin   Essential hypertension   -    BP at goal  -    Continue amlod 5, telmisartan 40    2   Dyslipidemia   -    LDL 86, much improved (167 2021)  -    atrova 40    3   Diabetes mellitus type II   -    A1C 6.1  -    Continue metformin - told her to ask her pharmacist if there was a different formulation that may be easier to swallow.     follow up with JI or care team in 3 months.       Samantha Mares PA-C     [facility]

## 2021-09-21 ENCOUNTER — OFFICE VISIT (OUTPATIENT)
Dept: CARDIOLOGY | Facility: MEDICAL CENTER | Age: 56
End: 2021-09-21
Payer: COMMERCIAL

## 2021-09-21 VITALS
SYSTOLIC BLOOD PRESSURE: 130 MMHG | HEART RATE: 83 BPM | WEIGHT: 135.6 LBS | RESPIRATION RATE: 14 BRPM | BODY MASS INDEX: 22.59 KG/M2 | DIASTOLIC BLOOD PRESSURE: 78 MMHG | HEIGHT: 65 IN | OXYGEN SATURATION: 97 %

## 2021-09-21 DIAGNOSIS — E78.5 DYSLIPIDEMIA: ICD-10-CM

## 2021-09-21 DIAGNOSIS — I45.10 RBBB (RIGHT BUNDLE BRANCH BLOCK): ICD-10-CM

## 2021-09-21 DIAGNOSIS — I10 ESSENTIAL HYPERTENSION, BENIGN: ICD-10-CM

## 2021-09-21 PROCEDURE — 99213 OFFICE O/P EST LOW 20 MIN: CPT | Performed by: PHYSICIAN ASSISTANT

## 2021-09-21 RX ORDER — AMLODIPINE BESYLATE 5 MG/1
5 TABLET ORAL DAILY
Qty: 90 TABLET | Refills: 3 | Status: SHIPPED | OUTPATIENT
Start: 2021-09-21 | End: 2022-02-23 | Stop reason: SDUPTHER

## 2021-09-21 RX ORDER — TELMISARTAN 40 MG/1
40 TABLET ORAL DAILY
Qty: 90 TABLET | Refills: 3 | Status: SHIPPED | OUTPATIENT
Start: 2021-09-21 | End: 2022-02-23 | Stop reason: SDUPTHER

## 2021-09-21 ASSESSMENT — FIBROSIS 4 INDEX: FIB4 SCORE: 0.77

## 2021-11-17 ENCOUNTER — HOSPITAL ENCOUNTER (EMERGENCY)
Facility: MEDICAL CENTER | Age: 56
End: 2021-11-17
Attending: EMERGENCY MEDICINE
Payer: COMMERCIAL

## 2021-11-17 ENCOUNTER — APPOINTMENT (OUTPATIENT)
Dept: RADIOLOGY | Facility: MEDICAL CENTER | Age: 56
End: 2021-11-17
Attending: EMERGENCY MEDICINE
Payer: COMMERCIAL

## 2021-11-17 VITALS
HEART RATE: 66 BPM | RESPIRATION RATE: 18 BRPM | BODY MASS INDEX: 20.83 KG/M2 | DIASTOLIC BLOOD PRESSURE: 61 MMHG | WEIGHT: 125 LBS | TEMPERATURE: 98.1 F | OXYGEN SATURATION: 99 % | SYSTOLIC BLOOD PRESSURE: 117 MMHG | HEIGHT: 65 IN

## 2021-11-17 DIAGNOSIS — R42 DIZZINESS: ICD-10-CM

## 2021-11-17 DIAGNOSIS — R53.83 FATIGUE, UNSPECIFIED TYPE: ICD-10-CM

## 2021-11-17 LAB
ALBUMIN SERPL BCP-MCNC: 4.7 G/DL (ref 3.2–4.9)
ALBUMIN/GLOB SERPL: 1.2 G/DL
ALP SERPL-CCNC: 84 U/L (ref 30–99)
ALT SERPL-CCNC: 18 U/L (ref 2–50)
ANION GAP SERPL CALC-SCNC: 13 MMOL/L (ref 7–16)
AST SERPL-CCNC: 18 U/L (ref 12–45)
BASOPHILS # BLD AUTO: 0.6 % (ref 0–1.8)
BASOPHILS # BLD: 0.04 K/UL (ref 0–0.12)
BILIRUB SERPL-MCNC: 0.4 MG/DL (ref 0.1–1.5)
BUN SERPL-MCNC: 12 MG/DL (ref 8–22)
CALCIUM SERPL-MCNC: 9.3 MG/DL (ref 8.5–10.5)
CHLORIDE SERPL-SCNC: 102 MMOL/L (ref 96–112)
CO2 SERPL-SCNC: 23 MMOL/L (ref 20–33)
CREAT SERPL-MCNC: 0.74 MG/DL (ref 0.5–1.4)
EKG IMPRESSION: NORMAL
EOSINOPHIL # BLD AUTO: 0.22 K/UL (ref 0–0.51)
EOSINOPHIL NFR BLD: 3.1 % (ref 0–6.9)
ERYTHROCYTE [DISTWIDTH] IN BLOOD BY AUTOMATED COUNT: 40.6 FL (ref 35.9–50)
GLOBULIN SER CALC-MCNC: 3.9 G/DL (ref 1.9–3.5)
GLUCOSE BLD-MCNC: 97 MG/DL (ref 65–99)
GLUCOSE SERPL-MCNC: 97 MG/DL (ref 65–99)
HCT VFR BLD AUTO: 45.4 % (ref 37–47)
HGB BLD-MCNC: 15.2 G/DL (ref 12–16)
IMM GRANULOCYTES # BLD AUTO: 0.02 K/UL (ref 0–0.11)
IMM GRANULOCYTES NFR BLD AUTO: 0.3 % (ref 0–0.9)
LYMPHOCYTES # BLD AUTO: 2.07 K/UL (ref 1–4.8)
LYMPHOCYTES NFR BLD: 29.2 % (ref 22–41)
MCH RBC QN AUTO: 28.5 PG (ref 27–33)
MCHC RBC AUTO-ENTMCNC: 33.5 G/DL (ref 33.6–35)
MCV RBC AUTO: 85 FL (ref 81.4–97.8)
MONOCYTES # BLD AUTO: 0.38 K/UL (ref 0–0.85)
MONOCYTES NFR BLD AUTO: 5.4 % (ref 0–13.4)
NEUTROPHILS # BLD AUTO: 4.37 K/UL (ref 2–7.15)
NEUTROPHILS NFR BLD: 61.4 % (ref 44–72)
NRBC # BLD AUTO: 0 K/UL
NRBC BLD-RTO: 0 /100 WBC
PLATELET # BLD AUTO: 283 K/UL (ref 164–446)
PMV BLD AUTO: 8.7 FL (ref 9–12.9)
POTASSIUM SERPL-SCNC: 3.7 MMOL/L (ref 3.6–5.5)
PROT SERPL-MCNC: 8.6 G/DL (ref 6–8.2)
RBC # BLD AUTO: 5.34 M/UL (ref 4.2–5.4)
SODIUM SERPL-SCNC: 138 MMOL/L (ref 135–145)
TROPONIN T SERPL-MCNC: <6 NG/L (ref 6–19)
WBC # BLD AUTO: 7.1 K/UL (ref 4.8–10.8)

## 2021-11-17 PROCEDURE — 84484 ASSAY OF TROPONIN QUANT: CPT

## 2021-11-17 PROCEDURE — 71045 X-RAY EXAM CHEST 1 VIEW: CPT

## 2021-11-17 PROCEDURE — 93005 ELECTROCARDIOGRAM TRACING: CPT | Performed by: EMERGENCY MEDICINE

## 2021-11-17 PROCEDURE — 82962 GLUCOSE BLOOD TEST: CPT

## 2021-11-17 PROCEDURE — 80053 COMPREHEN METABOLIC PANEL: CPT

## 2021-11-17 PROCEDURE — 99284 EMERGENCY DEPT VISIT MOD MDM: CPT

## 2021-11-17 PROCEDURE — 85025 COMPLETE CBC W/AUTO DIFF WBC: CPT

## 2021-11-17 PROCEDURE — 93005 ELECTROCARDIOGRAM TRACING: CPT

## 2021-11-17 ASSESSMENT — FIBROSIS 4 INDEX: FIB4 SCORE: 0.78

## 2021-11-18 NOTE — ED PROVIDER NOTES
ED Provider Note    CHIEF COMPLAINT  Chief Complaint   Patient presents with   • Difficulty Breathing     x1 hour   • Tingling     bilateral arms and legs, resolved now   • Dizziness       HPI  Rosario Imelda Apellanes Enerio is a 56 y.o. female who presents to the emergency department with brief period of feeling lightheaded and dizzy and tired. Past medical history as documented below. States that she sleeps very poorly due to her daily schedule. Today went to work and while at work had brief period of feeling lightheaded and dizzy and weak. She felt this may be secondary to her poor sleep and rest. She went to the works break room and then quickly became back to baseline and was feeling better. She has not had any recurrent symptoms since that time over the last couple of hours. Roberto did check her blood sugar given the fact that she is a known diabetic on metformin and her blood sugar was normal. She also takes medication for blood pressure and cholesterol and has not missed any of these. Again currently asymptomatic.    REVIEW OF SYSTEMS  See HPI for further details. All other systems are negative.     PAST MEDICAL HISTORY   has a past medical history of Diabetes (HCC), Heart murmur, Hypertension, and Palpitations.    SOCIAL HISTORY  Social History     Tobacco Use   • Smoking status: Never Smoker   • Smokeless tobacco: Never Used   Vaping Use   • Vaping Use: Never used   Substance and Sexual Activity   • Alcohol use: Never   • Drug use: Never   • Sexual activity: Not on file       SURGICAL HISTORY  patient denies any surgical history    CURRENT MEDICATIONS  Home Medications     Reviewed by Deonna Morton R.N. (Registered Nurse) on 11/17/21 at 2055  Med List Status: Not Addressed   Medication Last Dose Status   amLODIPine (NORVASC) 5 MG Tab  Active   atorvastatin (LIPITOR) 40 MG Tab  Active   metFORMIN ER (GLUCOPHAGE XR) 750 MG TABLET SR 24 HR  Active   telmisartan (MICARDIS) 40 MG Tab  Active           "      ALLERGIES  No Known Allergies    PHYSICAL EXAM  VITAL SIGNS: /91   Pulse 81   Temp 36.1 °C (97 °F) (Temporal)   Resp 16   Ht 1.651 m (5' 5\")   Wt 56.7 kg (125 lb)   SpO2 98%   BMI 20.80 kg/m²  @TATY[[id number]::@  Pulse ox interpretation: I interpret this pulse ox as normal.  Constitutional: Alert in no apparent distress.  HENT: Normocephalic, Atraumatic, Bilateral external ears normal. Nose normal.   Eyes: Pupils are equal and reactive. Conjunctiva normal, non-icteric.   Heart: Regular rate and rythm, no murmurs.    Lungs: Clear to auscultation bilaterally.  Skin: Warm, Dry, No erythema, No rash.   Neurologic: Alert, Grossly non-focal.   Psychiatric: Affect normal, Judgment normal, Mood normal, Appears appropriate and not intoxicated.     Results for orders placed or performed during the hospital encounter of 11/17/21   CBC WITH DIFFERENTIAL   Result Value Ref Range    WBC 7.1 4.8 - 10.8 K/uL    RBC 5.34 4.20 - 5.40 M/uL    Hemoglobin 15.2 12.0 - 16.0 g/dL    Hematocrit 45.4 37.0 - 47.0 %    MCV 85.0 81.4 - 97.8 fL    MCH 28.5 27.0 - 33.0 pg    MCHC 33.5 (L) 33.6 - 35.0 g/dL    RDW 40.6 35.9 - 50.0 fL    Platelet Count 283 164 - 446 K/uL    MPV 8.7 (L) 9.0 - 12.9 fL    Neutrophils-Polys 61.40 44.00 - 72.00 %    Lymphocytes 29.20 22.00 - 41.00 %    Monocytes 5.40 0.00 - 13.40 %    Eosinophils 3.10 0.00 - 6.90 %    Basophils 0.60 0.00 - 1.80 %    Immature Granulocytes 0.30 0.00 - 0.90 %    Nucleated RBC 0.00 /100 WBC    Neutrophils (Absolute) 4.37 2.00 - 7.15 K/uL    Lymphs (Absolute) 2.07 1.00 - 4.80 K/uL    Monos (Absolute) 0.38 0.00 - 0.85 K/uL    Eos (Absolute) 0.22 0.00 - 0.51 K/uL    Baso (Absolute) 0.04 0.00 - 0.12 K/uL    Immature Granulocytes (abs) 0.02 0.00 - 0.11 K/uL    NRBC (Absolute) 0.00 K/uL   COMP METABOLIC PANEL   Result Value Ref Range    Sodium 138 135 - 145 mmol/L    Potassium 3.7 3.6 - 5.5 mmol/L    Chloride 102 96 - 112 mmol/L    Co2 23 20 - 33 mmol/L    Anion Gap 13.0 7.0 - 16.0 "    Glucose 97 65 - 99 mg/dL    Bun 12 8 - 22 mg/dL    Creatinine 0.74 0.50 - 1.40 mg/dL    Calcium 9.3 8.5 - 10.5 mg/dL    AST(SGOT) 18 12 - 45 U/L    ALT(SGPT) 18 2 - 50 U/L    Alkaline Phosphatase 84 30 - 99 U/L    Total Bilirubin 0.4 0.1 - 1.5 mg/dL    Albumin 4.7 3.2 - 4.9 g/dL    Total Protein 8.6 (H) 6.0 - 8.2 g/dL    Globulin 3.9 (H) 1.9 - 3.5 g/dL    A-G Ratio 1.2 g/dL   TROPONIN   Result Value Ref Range    Troponin T <6 6 - 19 ng/L   ESTIMATED GFR   Result Value Ref Range    GFR If African American >60 >60 mL/min/1.73 m 2    GFR If Non African American >60 >60 mL/min/1.73 m 2   EKG   Result Value Ref Range    Report       [facility] Emergency Dept.    Test Date:  2021  Pt Name:    JOSE LUIS SAAVEDRA               Department: ER  MRN:        [id number]                      Room:  Gender:     Female                       Technician: [id number]  :        1965                   Requested By:ER TRIAGE PROTOCOL  Order #:    [id number]                    Reading MD:    Measurements  Intervals                                Axis  Rate:       77                           P:          57  PA:         152                          QRS:        63  QRSD:       123                          T:          54  QT:         425  QTc:        482    Interpretive Statements  Sinus rhythm  Right bundle branch block  Compared to ECG 2020 08:09:47  No significant changes     POCT glucose device results   Result Value Ref Range    Glucose - Accu-Ck 97 65 - 99 mg/dL     DX-CHEST-PORTABLE (1 VIEW)   Final Result         1.  No acute cardiopulmonary disease.            COURSE & MEDICAL DECISION MAKING  Pertinent Labs & Imaging studies reviewed. (See chart for details)  56-year-old female presented to the emergency room and with brief episode of dizziness. History as above. Her complaint of dizziness sounds like a near syncopal event and generalized fatigue. She explained that she has very poor sleep habits and  structure. She is diabetic. Blood sugar check and remaining lab evaluation as documented above all negative. Chest x-ray and EKG also completed and negative. She is now been asymptomatic for hours. Will have her follow-up with PCP and I've given her some bedside instruction on good sleep hygiene.       The patient will return for worsening symptoms and is stable at the time of discharge. The patient verbalizes understanding and will comply.    FINAL IMPRESSION  1. Dizziness    2. Fatigue, unspecified type               Electronically signed by: Arcenio Richter M.D., 11/17/2021 11:13 PM

## 2021-11-18 NOTE — ED TRIAGE NOTES
"Chief Complaint   Patient presents with   • Difficulty Breathing     x1 hour   • Tingling     bilateral arms and legs, resolved now   • Dizziness       BIB EMS from work when started to have above complaint. Now mostly resolved per pt. Denies CP and SOB at this time.    Hx of DM, sts hasnt not checked sugar but is compliant with Metformin. FSBS in triage 97    /91   Pulse 81   Temp 36.1 °C (97 °F) (Temporal)   Resp 16   Ht 1.651 m (5' 5\")   Wt 56.7 kg (125 lb)   SpO2 98%   BMI 20.80 kg/m²     "

## 2022-02-23 ENCOUNTER — OFFICE VISIT (OUTPATIENT)
Dept: CARDIOLOGY | Facility: MEDICAL CENTER | Age: 57
End: 2022-02-23
Payer: COMMERCIAL

## 2022-02-23 VITALS
OXYGEN SATURATION: 93 % | BODY MASS INDEX: 22.84 KG/M2 | RESPIRATION RATE: 12 BRPM | DIASTOLIC BLOOD PRESSURE: 70 MMHG | SYSTOLIC BLOOD PRESSURE: 122 MMHG | HEART RATE: 98 BPM | WEIGHT: 137.1 LBS | HEIGHT: 65 IN

## 2022-02-23 DIAGNOSIS — R94.31 NONSPECIFIC ABNORMAL ELECTROCARDIOGRAM (ECG) (EKG): ICD-10-CM

## 2022-02-23 DIAGNOSIS — I10 ESSENTIAL HYPERTENSION, BENIGN: ICD-10-CM

## 2022-02-23 DIAGNOSIS — E78.5 DYSLIPIDEMIA: ICD-10-CM

## 2022-02-23 PROCEDURE — 99213 OFFICE O/P EST LOW 20 MIN: CPT | Performed by: INTERNAL MEDICINE

## 2022-02-23 RX ORDER — AMLODIPINE BESYLATE 5 MG/1
5 TABLET ORAL DAILY
Qty: 90 TABLET | Refills: 3 | Status: SHIPPED | OUTPATIENT
Start: 2022-02-23 | End: 2022-05-13 | Stop reason: SDUPTHER

## 2022-02-23 RX ORDER — ATORVASTATIN CALCIUM 40 MG/1
40 TABLET, FILM COATED ORAL DAILY
Qty: 90 TABLET | Refills: 3 | Status: SHIPPED | OUTPATIENT
Start: 2022-02-23 | End: 2022-05-13 | Stop reason: SDUPTHER

## 2022-02-23 RX ORDER — TELMISARTAN 40 MG/1
40 TABLET ORAL DAILY
Qty: 90 TABLET | Refills: 3 | Status: SHIPPED | OUTPATIENT
Start: 2022-02-23 | End: 2023-04-05 | Stop reason: SDUPTHER

## 2022-02-23 ASSESSMENT — ENCOUNTER SYMPTOMS
BRUISES/BLEEDS EASILY: 0
NERVOUS/ANXIOUS: 0
WEAKNESS: 0
MYALGIAS: 0
WEIGHT LOSS: 0
HEADACHES: 0
SHORTNESS OF BREATH: 0
EYES NEGATIVE: 1
PALPITATIONS: 0
CLAUDICATION: 0
GASTROINTESTINAL NEGATIVE: 1
FEVER: 0
CARDIOVASCULAR NEGATIVE: 1
DIZZINESS: 0
RESPIRATORY NEGATIVE: 1
DOUBLE VISION: 0
CONSTITUTIONAL NEGATIVE: 1
NAUSEA: 0
ABDOMINAL PAIN: 0
VOMITING: 0
NEUROLOGICAL NEGATIVE: 1
MUSCULOSKELETAL NEGATIVE: 1
FOCAL WEAKNESS: 0
COUGH: 0
DEPRESSION: 0
CHILLS: 0
PSYCHIATRIC NEGATIVE: 1
BLURRED VISION: 0

## 2022-02-23 ASSESSMENT — FIBROSIS 4 INDEX: FIB4 SCORE: 0.82

## 2022-02-23 NOTE — PROGRESS NOTES
Chief Complaint   Patient presents with   • Hypertension   • Dyslipidemia   • AV Block Complete     F/V Dx: RBBB (right bundle branch block)           Subjective     Jennifer Saleh is a 56 y.o. female who presents today for follow up of hypertension and hyperlipidemia, medication change evaluation.    Since the patient's last visit on 07/20/21, she has been doing well clinically. She denies chest pain, shortness of breath, palpitations, nausea/vomiting or diaphoresis. On the last visit she was restarted on telmisartan, amlodipine, atorvastatin after being out of medications for 2 months and is tolerating this medication well without side effects. Her blood pressure and lipids are well controlled.    Past Medical History:   Diagnosis Date   • Diabetes (HCC)    • Heart murmur    • Hypertension    • Palpitations      History reviewed. No pertinent surgical history.  Family History   Problem Relation Age of Onset   • Heart Disease Mother    • Heart Disease Father    • Heart Disease Brother      Social History     Socioeconomic History   • Marital status:      Spouse name: Not on file   • Number of children: Not on file   • Years of education: Not on file   • Highest education level: Not on file   Occupational History   • Not on file   Tobacco Use   • Smoking status: Never Smoker   • Smokeless tobacco: Never Used   Vaping Use   • Vaping Use: Never used   Substance and Sexual Activity   • Alcohol use: Never   • Drug use: Never   • Sexual activity: Not on file   Other Topics Concern   • Not on file   Social History Narrative   • Not on file     Social Determinants of Health     Financial Resource Strain: Not on file   Food Insecurity: Not on file   Transportation Needs: Not on file   Physical Activity: Not on file   Stress: Not on file   Social Connections: Not on file   Intimate Partner Violence: Not on file   Housing Stability: Not on file     No Known Allergies     (Medications reviewed.)  Outpatient Encounter  "Medications as of 2/23/2022   Medication Sig Dispense Refill   • amLODIPine (NORVASC) 5 MG Tab Take 1 Tablet by mouth every day. 90 Tablet 3   • telmisartan (MICARDIS) 40 MG Tab Take 1 Tablet by mouth every day. 90 Tablet 3   • atorvastatin (LIPITOR) 40 MG Tab Take 1 Tablet by mouth every day. Take 1 tablet by mouth every night at bedtime 90 Tablet 3   • metFORMIN ER (GLUCOPHAGE XR) 750 MG TABLET SR 24 HR Take 1 tablet by mouth every day. 30 tablet 0   • [DISCONTINUED] amLODIPine (NORVASC) 5 MG Tab Take 1 Tablet by mouth every day. 90 Tablet 3   • [DISCONTINUED] telmisartan (MICARDIS) 40 MG Tab Take 1 Tablet by mouth every day. 90 Tablet 3   • [DISCONTINUED] atorvastatin (LIPITOR) 40 MG Tab Take 1 tablet by mouth every day. Take 1 tablet by mouth every night at bedtime 90 tablet 3     No facility-administered encounter medications on file as of 2/23/2022.     Review of Systems   Constitutional: Negative.  Negative for chills, fever, malaise/fatigue and weight loss.   HENT: Negative.  Negative for hearing loss.    Eyes: Negative.  Negative for blurred vision and double vision.   Respiratory: Negative.  Negative for cough and shortness of breath.    Cardiovascular: Negative.  Negative for chest pain, palpitations, claudication and leg swelling.   Gastrointestinal: Negative.  Negative for abdominal pain, nausea and vomiting.   Genitourinary: Negative.  Negative for dysuria and urgency.   Musculoskeletal: Negative.  Negative for joint pain and myalgias.   Skin: Negative.  Negative for itching and rash.   Neurological: Negative.  Negative for dizziness, focal weakness, weakness and headaches.   Endo/Heme/Allergies: Negative.  Does not bruise/bleed easily.   Psychiatric/Behavioral: Negative.  Negative for depression. The patient is not nervous/anxious.               Objective     /70 (BP Location: Left arm, Patient Position: Sitting, BP Cuff Size: Adult)   Pulse 98   Resp 12   Ht 1.651 m (5' 5\")   Wt 62.2 kg " (137 lb 1.6 oz)   SpO2 93%   BMI 22.81 kg/m²     Physical Exam  Constitutional:       Appearance: She is well-developed.   HENT:      Head: Normocephalic and atraumatic.   Neck:      Vascular: No JVD.   Cardiovascular:      Rate and Rhythm: Normal rate and regular rhythm.      Heart sounds: Normal heart sounds.   Pulmonary:      Effort: Pulmonary effort is normal.      Breath sounds: Normal breath sounds.   Abdominal:      General: Bowel sounds are normal.      Palpations: Abdomen is soft.      Comments: No hepatosplenomegaly.   Musculoskeletal:         General: Normal range of motion.   Lymphadenopathy:      Cervical: No cervical adenopathy.   Skin:     General: Skin is warm and dry.   Neurological:      Mental Status: She is alert and oriented to person, place, and time.            CARDIAC STUDIES/PROCEDURES:     ECHOCARDIOGRAM CONCLUSIONS (06/09/20)  No prior study is available for comparison.   Normal transthoracic echocardiogram.      EKG performed on (11/17/21) was reviewed: EKG personally interpreted shows sinus rhythm with right bundle branch block.  EKG performed on (05/28/20) EKG shows sinus rhythm with right bundle branch block.     Laboratory results of (08/24/21) were reviewed. Cholesterol profile of 144/70/44/86 mg/dL noted.  Laboratory results of (05/14/21) Cholesterol profile of 238/147/42/167 mg/dL noted.    Assessment & Plan     1. Essential hypertension, benign     2. Dyslipidemia     3. Nonspecific abnormal electrocardiogram (ECG) (EKG)         Medical Decision Making: Today's Assessment/Status/Plan:        1. Hypertension: Blood pressure is well controlled. We will continue with telmisartan and amlodipine.  2. Hyperlipidemia: She is doing well on statin therapy without myalgia symptoms.  3. Abnormal EKG, right bundle branch block: We ordered myocardial perfusion imaging study, however she could not afford the copay.    We will follow up the patient in one year.    KRYSTLE Hillman,  Ana

## 2022-03-03 ENCOUNTER — HOSPITAL ENCOUNTER (OUTPATIENT)
Dept: LAB | Facility: MEDICAL CENTER | Age: 57
End: 2022-03-03
Attending: PHYSICIAN ASSISTANT
Payer: COMMERCIAL

## 2022-03-03 LAB
ALBUMIN SERPL BCP-MCNC: 4.3 G/DL (ref 3.2–4.9)
ALBUMIN/GLOB SERPL: 1.2 G/DL
ALP SERPL-CCNC: 70 U/L (ref 30–99)
ALT SERPL-CCNC: 7 U/L (ref 2–50)
ANION GAP SERPL CALC-SCNC: 12 MMOL/L (ref 7–16)
AST SERPL-CCNC: 11 U/L (ref 12–45)
BILIRUB SERPL-MCNC: 0.5 MG/DL (ref 0.1–1.5)
BUN SERPL-MCNC: 13 MG/DL (ref 8–22)
CALCIUM SERPL-MCNC: 9.4 MG/DL (ref 8.5–10.5)
CHLORIDE SERPL-SCNC: 106 MMOL/L (ref 96–112)
CO2 SERPL-SCNC: 23 MMOL/L (ref 20–33)
CREAT SERPL-MCNC: 0.79 MG/DL (ref 0.5–1.4)
EST. AVERAGE GLUCOSE BLD GHB EST-MCNC: 137 MG/DL
GLOBULIN SER CALC-MCNC: 3.5 G/DL (ref 1.9–3.5)
GLUCOSE SERPL-MCNC: 94 MG/DL (ref 65–99)
HBA1C MFR BLD: 6.4 % (ref 4–5.6)
POTASSIUM SERPL-SCNC: 3.9 MMOL/L (ref 3.6–5.5)
PROT SERPL-MCNC: 7.8 G/DL (ref 6–8.2)
SODIUM SERPL-SCNC: 141 MMOL/L (ref 135–145)

## 2022-03-03 PROCEDURE — 36415 COLL VENOUS BLD VENIPUNCTURE: CPT

## 2022-03-03 PROCEDURE — 80053 COMPREHEN METABOLIC PANEL: CPT

## 2022-03-03 PROCEDURE — 83036 HEMOGLOBIN GLYCOSYLATED A1C: CPT

## 2022-03-28 ENCOUNTER — APPOINTMENT (OUTPATIENT)
Dept: MEDICAL GROUP | Facility: CLINIC | Age: 57
End: 2022-03-28
Payer: COMMERCIAL

## 2022-05-13 ENCOUNTER — OFFICE VISIT (OUTPATIENT)
Dept: MEDICAL GROUP | Facility: CLINIC | Age: 57
End: 2022-05-13
Payer: COMMERCIAL

## 2022-05-13 VITALS
OXYGEN SATURATION: 98 % | HEART RATE: 76 BPM | HEIGHT: 63 IN | WEIGHT: 136.6 LBS | SYSTOLIC BLOOD PRESSURE: 130 MMHG | BODY MASS INDEX: 24.2 KG/M2 | DIASTOLIC BLOOD PRESSURE: 80 MMHG

## 2022-05-13 DIAGNOSIS — Z13.79 GENETIC SCREENING: ICD-10-CM

## 2022-05-13 DIAGNOSIS — Z12.11 SCREENING FOR COLORECTAL CANCER: ICD-10-CM

## 2022-05-13 DIAGNOSIS — E78.5 DYSLIPIDEMIA: ICD-10-CM

## 2022-05-13 DIAGNOSIS — F32.9 REACTIVE DEPRESSION: ICD-10-CM

## 2022-05-13 DIAGNOSIS — Z12.12 SCREENING FOR COLORECTAL CANCER: ICD-10-CM

## 2022-05-13 DIAGNOSIS — T78.40XD ALLERGY, SUBSEQUENT ENCOUNTER: ICD-10-CM

## 2022-05-13 DIAGNOSIS — I10 ESSENTIAL HYPERTENSION, BENIGN: ICD-10-CM

## 2022-05-13 DIAGNOSIS — E11.9 TYPE 2 DIABETES MELLITUS WITHOUT COMPLICATION, WITHOUT LONG-TERM CURRENT USE OF INSULIN (HCC): ICD-10-CM

## 2022-05-13 DIAGNOSIS — Z12.31 SCREENING MAMMOGRAM FOR BREAST CANCER: ICD-10-CM

## 2022-05-13 DIAGNOSIS — Z12.11 COLON CANCER SCREENING: ICD-10-CM

## 2022-05-13 PROBLEM — T78.40XA ALLERGIES: Status: ACTIVE | Noted: 2022-05-13

## 2022-05-13 PROCEDURE — 99204 OFFICE O/P NEW MOD 45 MIN: CPT | Performed by: FAMILY MEDICINE

## 2022-05-13 RX ORDER — FLUTICASONE PROPIONATE 50 MCG
2 SPRAY, SUSPENSION (ML) NASAL DAILY
Qty: 16 G | Refills: 11 | Status: SHIPPED | OUTPATIENT
Start: 2022-05-13 | End: 2023-01-26

## 2022-05-13 RX ORDER — ATORVASTATIN CALCIUM 40 MG/1
40 TABLET, FILM COATED ORAL DAILY
Qty: 90 TABLET | Refills: 3 | Status: SHIPPED | OUTPATIENT
Start: 2022-05-13 | End: 2023-04-04 | Stop reason: SDUPTHER

## 2022-05-13 RX ORDER — METFORMIN HYDROCHLORIDE 500 MG/1
TABLET, EXTENDED RELEASE ORAL
COMMUNITY
Start: 2022-04-10 | End: 2022-05-13 | Stop reason: SDUPTHER

## 2022-05-13 RX ORDER — METFORMIN HYDROCHLORIDE 500 MG/1
500 TABLET, EXTENDED RELEASE ORAL DAILY
Qty: 90 TABLET | Refills: 3 | Status: SHIPPED | OUTPATIENT
Start: 2022-05-13 | End: 2023-07-25

## 2022-05-13 RX ORDER — CITALOPRAM HYDROBROMIDE 10 MG/1
10 TABLET ORAL DAILY
Qty: 90 TABLET | Refills: 3 | Status: SHIPPED | OUTPATIENT
Start: 2022-05-13 | End: 2023-01-26

## 2022-05-13 RX ORDER — AMLODIPINE BESYLATE 5 MG/1
5 TABLET ORAL DAILY
Qty: 90 TABLET | Refills: 3 | Status: SHIPPED | OUTPATIENT
Start: 2022-05-13 | End: 2023-07-25

## 2022-05-13 RX ORDER — FLUTICASONE PROPIONATE 50 MCG
SPRAY, SUSPENSION (ML) NASAL
COMMUNITY
Start: 2022-02-26 | End: 2022-05-13 | Stop reason: SDUPTHER

## 2022-05-13 ASSESSMENT — PATIENT HEALTH QUESTIONNAIRE - PHQ9
CLINICAL INTERPRETATION OF PHQ2 SCORE: 3
SUM OF ALL RESPONSES TO PHQ QUESTIONS 1-9: 11
5. POOR APPETITE OR OVEREATING: 3 - NEARLY EVERY DAY

## 2022-05-13 ASSESSMENT — FIBROSIS 4 INDEX: FIB4 SCORE: 0.81

## 2022-05-13 NOTE — ASSESSMENT & PLAN NOTE
She is currently tolerating metformin 1000 mg in the morning and 500 mg in the evening.  Her last hemoglobin A1c is 6.4.  She will be seeing the eye doctor this week.

## 2022-05-13 NOTE — ASSESSMENT & PLAN NOTE
She is currently well controlled on telmisartan and amlodipine.  We will continue these as prescribed and send in a year full of refills.

## 2022-05-13 NOTE — PROGRESS NOTES
Subjective:   CC: Follow-up blood pressure cholesterol diabetes    HPI: Her blood pressure at home is well controlled on telmisartan and amlodipine both of which she is tolerating.  She is due for refills.  She is tolerating her metformin at 1000 mg in the morning and 500 mg in the evening.  Her last hemoglobin A1c was 6.4.  She is currently taking atorvastatin and her lipid profile is favorable.  She did not do well on depression screening today and upon questioning became tearful and apparently is being bullied at work.  No thoughts of self-harm or harming others.  She is interested in discussing a medication.  She has not had colon cancer screening or breast cancer screening.  She has had her COVID-vaccine.    Problem   Screening for Colorectal Cancer   Allergies   Type 2 Diabetes Mellitus Without Complication, Without Long-Term Current Use of Insulin ([facility])   Reactive Depression   Dyslipidemia   Essential Hypertension, Benign       Current Outpatient Medications Ordered in Epic   Medication Sig Dispense Refill   • citalopram (CELEXA) 10 MG tablet Take 1 Tablet by mouth every day. 90 Tablet 3   • amLODIPine (NORVASC) 5 MG Tab Take 1 Tablet by mouth every day. 90 Tablet 3   • atorvastatin (LIPITOR) 40 MG Tab Take 1 Tablet by mouth every day. Take 1 tablet by mouth every night at bedtime 90 Tablet 3   • fluticasone (FLONASE) 50 MCG/ACT nasal spray Administer 2 Sprays into affected nostril(S) every day. 16 g 11   • metFORMIN ER (GLUCOPHAGE XR) 500 MG TABLET SR 24 HR Take 1 Tablet by mouth every day. 2 po q am 1 po q pm 90 Tablet 3   • telmisartan (MICARDIS) 40 MG Tab Take 1 Tablet by mouth every day. 90 Tablet 3     No current Epic-ordered facility-administered medications on file.         ROS:  Gen: no fevers/chills  Pulm: no sob, no cough  CV: no chest pain, no palpitations  GI: no nausea/vomiting, no diarrhea        Objective:     Exam:  /80 (BP Location: Right arm, Patient Position: Sitting, BP Cuff Size:  "Adult)   Pulse 76   Ht 1.588 m (5' 2.5\")   Wt 62 kg (136 lb 9.6 oz)   SpO2 98%   BMI 24.59 kg/m²  Body mass index is 24.59 kg/m².    Gen: Alert and oriented, No apparent distress.  Neck: Neck is supple without lymphadenopathy.  Lungs: Normal effort, CTA bilaterally, no wheezes, rhonchi, or rales  CV: Regular rate and rhythm. No murmurs, rubs, or gallops.  Ext: No edema.      Assessment & Plan:     56 y.o. female with the following -     Problem List Items Addressed This Visit     Essential hypertension, benign     She is currently well controlled on telmisartan and amlodipine.  We will continue these as prescribed and send in a year full of refills.           Relevant Medications    amLODIPine (NORVASC) 5 MG Tab    atorvastatin (LIPITOR) 40 MG Tab    Other Relevant Orders    Referral to Genetic Research Studies    Dyslipidemia     Her lipid profile is favorable on Lipitor 40 mg which she is tolerating.  I will send in a year full of refills.           Relevant Medications    atorvastatin (LIPITOR) 40 MG Tab    Other Relevant Orders    Referral to Genetic Research Studies    Screening for colorectal cancer    Allergies     I have advised that she restart her Flonase and have sent in refills           Relevant Medications    fluticasone (FLONASE) 50 MCG/ACT nasal spray    Type 2 diabetes mellitus without complication, without long-term current use of insulin (HCC)     She is currently tolerating metformin 1000 mg in the morning and 500 mg in the evening.  Her last hemoglobin A1c is 6.4.  She will be seeing the eye doctor this week.           Relevant Medications    metFORMIN ER (GLUCOPHAGE XR) 500 MG TABLET SR 24 HR    Other Relevant Orders    Referral to Genetic Research Studies    Reactive depression     She is most unfortunately being bullied at work and is very upset about this.  She has no intent of self-harm or harming another.  We did discuss medication, however this will only do so much.  We discussed at " length the potential for her considering switching jobs, which might be the best thing for her.           Relevant Medications    citalopram (CELEXA) 10 MG tablet    Other Relevant Orders    Patient has been identified as having a positive depression screening. Appropriate orders and counseling have been given. (Completed)                No follow-ups on file.

## 2022-05-13 NOTE — ASSESSMENT & PLAN NOTE
Her lipid profile is favorable on Lipitor 40 mg which she is tolerating.  I will send in a year full of refills.

## 2022-05-13 NOTE — ASSESSMENT & PLAN NOTE
She is most unfortunately being bullied at work and is very upset about this.  She has no intent of self-harm or harming another.  We did discuss medication, however this will only do so much.  We discussed at length the potential for her considering switching jobs, which might be the best thing for her.

## 2022-07-07 ENCOUNTER — HOSPITAL ENCOUNTER (OUTPATIENT)
Dept: RADIOLOGY | Facility: MEDICAL CENTER | Age: 57
End: 2022-07-07
Payer: COMMERCIAL

## 2022-07-08 ENCOUNTER — HOSPITAL ENCOUNTER (OUTPATIENT)
Dept: RADIOLOGY | Facility: MEDICAL CENTER | Age: 57
End: 2022-07-08
Attending: STUDENT IN AN ORGANIZED HEALTH CARE EDUCATION/TRAINING PROGRAM
Payer: COMMERCIAL

## 2022-07-08 ENCOUNTER — HOSPITAL ENCOUNTER (OUTPATIENT)
Dept: RADIOLOGY | Facility: MEDICAL CENTER | Age: 57
End: 2022-07-08
Attending: FAMILY MEDICINE
Payer: COMMERCIAL

## 2023-01-26 ENCOUNTER — OFFICE VISIT (OUTPATIENT)
Dept: MEDICAL GROUP | Facility: IMAGING CENTER | Age: 58
End: 2023-01-26
Payer: COMMERCIAL

## 2023-01-26 VITALS
HEIGHT: 65 IN | OXYGEN SATURATION: 98 % | WEIGHT: 147 LBS | HEART RATE: 83 BPM | TEMPERATURE: 97.7 F | SYSTOLIC BLOOD PRESSURE: 138 MMHG | DIASTOLIC BLOOD PRESSURE: 88 MMHG | BODY MASS INDEX: 24.49 KG/M2

## 2023-01-26 DIAGNOSIS — Z00.00 WELLNESS EXAMINATION: ICD-10-CM

## 2023-01-26 DIAGNOSIS — Z13.29 SCREENING FOR THYROID DISORDER: ICD-10-CM

## 2023-01-26 DIAGNOSIS — Z13.21 ENCOUNTER FOR VITAMIN DEFICIENCY SCREENING: ICD-10-CM

## 2023-01-26 DIAGNOSIS — Z13.0 SCREENING FOR DEFICIENCY ANEMIA: ICD-10-CM

## 2023-01-26 DIAGNOSIS — Z23 NEED FOR VACCINATION: ICD-10-CM

## 2023-01-26 DIAGNOSIS — J31.0 CHRONIC RHINITIS: ICD-10-CM

## 2023-01-26 DIAGNOSIS — E78.5 DYSLIPIDEMIA: ICD-10-CM

## 2023-01-26 DIAGNOSIS — E11.9 TYPE 2 DIABETES MELLITUS WITHOUT COMPLICATION, WITHOUT LONG-TERM CURRENT USE OF INSULIN (HCC): ICD-10-CM

## 2023-01-26 DIAGNOSIS — I10 ESSENTIAL HYPERTENSION, BENIGN: ICD-10-CM

## 2023-01-26 PROBLEM — Z12.11 SCREENING FOR COLORECTAL CANCER: Status: RESOLVED | Noted: 2022-05-13 | Resolved: 2023-01-26

## 2023-01-26 PROBLEM — Z12.12 SCREENING FOR COLORECTAL CANCER: Status: RESOLVED | Noted: 2022-05-13 | Resolved: 2023-01-26

## 2023-01-26 PROCEDURE — 90686 IIV4 VACC NO PRSV 0.5 ML IM: CPT | Performed by: PHYSICIAN ASSISTANT

## 2023-01-26 PROCEDURE — 99396 PREV VISIT EST AGE 40-64: CPT | Mod: 25 | Performed by: PHYSICIAN ASSISTANT

## 2023-01-26 PROCEDURE — 90715 TDAP VACCINE 7 YRS/> IM: CPT | Performed by: PHYSICIAN ASSISTANT

## 2023-01-26 PROCEDURE — 90472 IMMUNIZATION ADMIN EACH ADD: CPT | Performed by: PHYSICIAN ASSISTANT

## 2023-01-26 PROCEDURE — 90471 IMMUNIZATION ADMIN: CPT | Performed by: PHYSICIAN ASSISTANT

## 2023-01-26 RX ORDER — MONTELUKAST SODIUM 10 MG/1
10 TABLET ORAL DAILY
Qty: 30 TABLET | Refills: 0 | Status: CANCELLED | OUTPATIENT
Start: 2023-01-26

## 2023-01-26 ASSESSMENT — FIBROSIS 4 INDEX: FIB4 SCORE: 0.82

## 2023-01-26 ASSESSMENT — PATIENT HEALTH QUESTIONNAIRE - PHQ9: CLINICAL INTERPRETATION OF PHQ2 SCORE: 0

## 2023-01-26 NOTE — PROGRESS NOTES
Subjective:     CC:   Chief Complaint   Patient presents with    Runny Nose     chronic    Referral Needed     gnyecology    Annual Exam       HPI:   Jennifer presents today to discuss:    Chronic rhinitis  Patient admits to chronic runny nose at work.  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  Not taking anything over-the-counter.    Dyslipidemia  Chronic, on atorvastatin 40 mg daily.  Due for labs.    Essential hypertension, benign  Chronic, on amlodipine 5 mg daily and telmisartan 40 mg daily.    Type 2 diabetes mellitus without complication, without long-term current use of insulin (HCC)  Chronic, on metformin 1000 mg every morning and 500 mg every afternoon.    Health Maintenance/Anticipatory Guidance:  Diet: high sugar intake  Exercise: not routinely, walks a lot at work  Substance Abuse: no history  Safe in Relationship: yes  Sun Protection/Sunscreen: no   Dermatologist: declined  Dentist: due for twice yearly appointments  Eye Doctor: due - pt will look up ophthalm name  Seatbelts, helmets, and gun safety discussed    Cancer Screening:  Colorectal Cancer: 2021 per pt  Cervical Cancer: due  Breast Cancer: UTD    Infectious Disease Screening:  STI/HIV screening: declined    Immunizations: flu and tdap today    Past Medical History:   Diagnosis Date    Diabetes (HCC)     Heart murmur     Hypertension     Palpitations      Family History   Problem Relation Age of Onset    Heart Disease Mother     Heart Disease Father     Heart Disease Sister         pacemaker    Stroke Sister     Ovarian Cancer Sister     Heart Disease Brother         transplant     History reviewed. No pertinent surgical history.  Social History     Tobacco Use    Smoking status: Never    Smokeless tobacco: Never   Vaping Use    Vaping Use: Never used   Substance Use Topics    Alcohol use: Never    Drug use: Never     Social History     Social History Narrative    From the [facility]. Moved in 1985.    , no  "kids.     Current Outpatient Medications Ordered in Epic   Medication Sig Dispense Refill    amLODIPine (NORVASC) 5 MG Tab Take 1 Tablet by mouth every day. 90 Tablet 3    atorvastatin (LIPITOR) 40 MG Tab Take 1 Tablet by mouth every day. Take 1 tablet by mouth every night at bedtime 90 Tablet 3    metFORMIN ER (GLUCOPHAGE XR) 500 MG TABLET SR 24 HR Take 1 Tablet by mouth every day. 2 po q am 1 po q pm 90 Tablet 3    telmisartan (MICARDIS) 40 MG Tab Take 1 Tablet by mouth every day. 90 Tablet 3     No current Epic-ordered facility-administered medications on file.     Patient has no known allergies.    PMH/PSH/FH/Social history reviewed.  Vaccinations discussed.  Previous records and labs reviewed. Discussed age appropriate anticipatory guidance.    ROS: see hpi  Gen: no fevers/chills  Pulm: no sob, no cough  CV: no chest pain, no palpitations, no edema  GI: no nausea/vomiting, no diarrhea  Skin: no rash    Objective:   Exam:  /88 (BP Location: Right arm, Patient Position: Sitting, BP Cuff Size: Adult)   Pulse 83   Temp 36.5 °C (97.7 °F) (Temporal)   Ht 1.651 m (5' 5\")   Wt 66.7 kg (147 lb)   SpO2 98%   BMI 24.46 kg/m²    Body mass index is 24.46 kg/m².    Gen: Alert and oriented, No apparent distress.  HEENT: Head atraumatic, normocephalic. Pupils equal and round.  Neck: Neck is supple without lymphadenopathy.   Lungs: Normal effort, CTA bilaterally, no wheezes, rhonchi, or rales  CV: Regular rate and rhythm. No murmurs, rubs, or gallops.  ABD: +BS. Non-tender, non-distended. No rebound, rigidity, or guarding.  Ext: No clubbing, cyanosis, edema.    Assessment & Plan:     57 y.o. female with the following -     1. Wellness examination  PMH/PSH/FH/Social history reviewed.  Vaccinations discussed.  Previous records and labs reviewed. Discussed age appropriate anticipatory guidance.  Pap next visit.    2. Type 2 diabetes mellitus without complication, without long-term current use of insulin (HCC)  Chronic, " controlled and stable. Continue current regimen.   Diabetes recommendations:  -Follow an 1800 calorie ADA diet and aim to get about half of your calories from carbohydrates. For an 1800 calorie diet, that would be around 150-200 grams of carbs a day. Exercise as tolerated; ideally 150 minutes of cardiovascular exercise a week, or 20 minutes a day.   -Monitor blood sugars at home and keep a log book. Check your glucose fasting every morning, before dinner, and 2 hours after dinner (or as otherwise recommended at your appointment). Call if glucose <70 or >300. Please send weekly readings of your blood glucose through [employer].  -Please check your feet frequently for any open wounds/ulcers, signs of infection, or changes in sensation/neuropathy.  -Ensure that you see your ophthalmologist for your annual eye exam to assess for diabetic retinopathy.  - Microalbumin Creat Ratio Urine (Lab Collect); Future  - Diabetic Monofilament LE Exam  - Comp Metabolic Panel; Future  - HEMOGLOBIN A1C; Future    3. Dyslipidemia  Chronic, controlled and stable. Continue current regimen.   - Lipid Profile; Future    4. Essential hypertension, benign  Chronic, controlled and stable. Continue current regimen. Recommend DASH diet, exercise as tolerated. Monitor Blood Pressure at home and report any consistent readings above >140/90. Seek medical help/ER if chest pain, palpitations, shortness of breath, headache, dizziness.   - Comp Metabolic Panel; Future    5. Chronic rhinitis  Start over-the-counter Claritin 10 mg daily.  Reassess in 1 week.    6. Screening for deficiency anemia  - CBC WITH DIFFERENTIAL; Future  - VITAMIN B12; Future    7. Encounter for vitamin deficiency screening  - VITAMIN D,25 HYDROXY (DEFICIENCY); Future  - VITAMIN B12; Future    8. Screening for thyroid disorder  - TSH WITH REFLEX TO FT4; Future    9. Need for vaccination  - Tdap Vaccine =>8YO IM  - INFLUENZA VACCINE QUAD INJ (PF)    Return in about 1 week (around  2/2/2023) for Follow-up labs/tests, Pap smear.    Hien Frey PA-C ([place])  Physician Assistant Certified  [facility]    Please note that this dictation was created using voice recognition software. I have made every reasonable attempt to correct obvious errors, but I expect that there are errors of grammar and possibly content that I did not discover before finalizing the note.

## 2023-01-26 NOTE — PATIENT INSTRUCTIONS
It was a pleasure meeting with you today at [facility]!    Your medical history/records and medications were reviewed today.     Please review my practice information below:    If you have any prescription refill requests, please send them via [employer] or discuss with your provider at the start of your office visits. Please allow 3-5 business days for lab and testing review and you will be contacted via [employer] with those results, or if advised to make a follow up appointment regarding those results, then please do so.     Once resulted, your lab/test/imaging results will show up automatically in your MyChart. Please wait for my interpretation and recommendations prior to viewing your results to avoid any unnecessary confusion or misinterpretation. I will address all of the lab values that I interpret as abnormal and message you accordingly on your MyChart. I will always send you a message about your results even if they are normal. If you do not hear back from me within 5-7 business days after completing your tests, then please send me a message on [employer] so I can obtain your results (especially if you went to an outside lab or imaging center - [facility], [facility], etc).     If you have any additional questions or concerns beyond my interpretation of your results, please make an appointment with me to discuss in further detail.    Please only use the [employer] messaging system for questions regarding your most recent appointment or if advised to use otherwise (glucose or blood pressure reporting).     If you have any new problems or concerns, you must make an appointment to discuss. This includes any referral requests, lab requests (unless advised to notify me for pre-appt labs), medication side effects, or request for medication adjustments.     Please arrive 15 minutes prior to your appointment time to complete your check-in and intake with the medical assistant.      Thank you,    Hien Barakat) Tk  URSULA  Physician Assistant Certified  [facility]    -----------------------------------------------------------------    Attn: Patients of [facility]:    In an effort to continue to provide excellent and efficient care to our patients, it is vital that we continue to use our resources appropriately. With that, this is a reminder that [employer] is used for prescription refill requests, test results, virtual visits, and chart review only.     Any new questions, concerns/conditions, lab/imaging requests, medication adjustments, new prescriptions, or referral requests do require an appointment (virtually or in person), unless discussed otherwise at your most recent appointment.     Thank you for your understanding,    [facility]

## 2023-01-26 NOTE — LETTER
[facility]  Hien Frey P.A.-C.  [address]   [place] [address]  Fax: [phone]   Authorization for Release/Disclosure of   Protected Health Information   Name: JENNIFER SAAVEDRA : 1965 SSN: [id number]   Address: [place] [address] Phone:    [phone] (home)    I authorize the entity listed below to release/disclose the PHI below to:   [facility]/Hien Frey P.A.-C. and Hien Frey P.A.-C.   Provider or Entity Name:[facility]      Address   City, [facility] [[email or web address]   Phone:      Fax:[phone]     Reason for request: continuity of care   Information to be released:    [XXX  ] LAST COLONOSCOPY,  including any PATH REPORT and follow-up  [  ] LAST FIT/COLOGUARD RESULT [  ] LAST DEXA  [  ] LAST MAMMOGRAM  [  ] LAST PAP  [  ] LAST LABS [  ] RETINA EXAM REPORT  [  ] IMMUNIZATION RECORDS  [  ] Release all info      [  ] Check here and initial the line next to each item to release ALL health information INCLUDING  _____ Care and treatment for drug and / or alcohol abuse  _____ HIV testing, infection status, or AIDS  _____ Genetic Testing    DATES OF SERVICE OR TIME PERIOD TO BE DISCLOSED: _____________  I understand and acknowledge that:  * This Authorization may be revoked at any time by you in writing, except if your health information has already been used or disclosed.  * Your health information that will be used or disclosed as a result of you signing this authorization could be re-disclosed by the recipient. If this occurs, your re-disclosed health information may no longer be protected by State or Federal laws.  * You may refuse to sign this Authorization. Your refusal will not affect your ability to obtain treatment.  * This Authorization becomes effective upon signing and will  on (date) __________.      If no date is indicated, this Authorization will  one (1) year from the signature date.    Name: Jennifer BEARDEN  Delfino    Signature:Continuity of care    Date:     1/26/2023       PLEASE FAX REQUESTED RECORDS BACK TO: [phone]

## 2023-01-27 NOTE — ASSESSMENT & PLAN NOTE
Patient admits to chronic runny nose at work.  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  Not taking anything over-the-counter.

## 2023-02-13 ENCOUNTER — HOSPITAL ENCOUNTER (OUTPATIENT)
Dept: LAB | Facility: MEDICAL CENTER | Age: 58
End: 2023-02-13
Attending: PHYSICIAN ASSISTANT
Payer: COMMERCIAL

## 2023-02-13 DIAGNOSIS — E11.9 TYPE 2 DIABETES MELLITUS WITHOUT COMPLICATION, WITHOUT LONG-TERM CURRENT USE OF INSULIN (HCC): ICD-10-CM

## 2023-02-13 DIAGNOSIS — I10 ESSENTIAL HYPERTENSION, BENIGN: ICD-10-CM

## 2023-02-13 DIAGNOSIS — Z13.0 SCREENING FOR DEFICIENCY ANEMIA: ICD-10-CM

## 2023-02-13 DIAGNOSIS — Z13.29 SCREENING FOR THYROID DISORDER: ICD-10-CM

## 2023-02-13 DIAGNOSIS — E78.5 DYSLIPIDEMIA: ICD-10-CM

## 2023-02-13 DIAGNOSIS — Z13.21 ENCOUNTER FOR VITAMIN DEFICIENCY SCREENING: ICD-10-CM

## 2023-02-13 LAB
25(OH)D3 SERPL-MCNC: 17 NG/ML (ref 30–100)
ALBUMIN SERPL BCP-MCNC: 4.2 G/DL (ref 3.2–4.9)
ALBUMIN/GLOB SERPL: 1.2 G/DL
ALP SERPL-CCNC: 75 U/L (ref 30–99)
ALT SERPL-CCNC: 29 U/L (ref 2–50)
ANION GAP SERPL CALC-SCNC: 10 MMOL/L (ref 7–16)
AST SERPL-CCNC: 34 U/L (ref 12–45)
BASOPHILS # BLD AUTO: 0.5 % (ref 0–1.8)
BASOPHILS # BLD: 0.03 K/UL (ref 0–0.12)
BILIRUB SERPL-MCNC: 0.5 MG/DL (ref 0.1–1.5)
BUN SERPL-MCNC: 11 MG/DL (ref 8–22)
CALCIUM ALBUM COR SERPL-MCNC: 8.8 MG/DL (ref 8.5–10.5)
CALCIUM SERPL-MCNC: 9 MG/DL (ref 8.5–10.5)
CHLORIDE SERPL-SCNC: 104 MMOL/L (ref 96–112)
CHOLEST SERPL-MCNC: 163 MG/DL (ref 100–199)
CO2 SERPL-SCNC: 25 MMOL/L (ref 20–33)
CREAT SERPL-MCNC: 0.88 MG/DL (ref 0.5–1.4)
CREAT UR-MCNC: 188.32 MG/DL
EOSINOPHIL # BLD AUTO: 0.26 K/UL (ref 0–0.51)
EOSINOPHIL NFR BLD: 4.3 % (ref 0–6.9)
ERYTHROCYTE [DISTWIDTH] IN BLOOD BY AUTOMATED COUNT: 40.4 FL (ref 35.9–50)
GFR SERPLBLD CREATININE-BSD FMLA CKD-EPI: 77 ML/MIN/1.73 M 2
GLOBULIN SER CALC-MCNC: 3.4 G/DL (ref 1.9–3.5)
GLUCOSE SERPL-MCNC: 103 MG/DL (ref 65–99)
HCT VFR BLD AUTO: 45.4 % (ref 37–47)
HDLC SERPL-MCNC: 50 MG/DL
HGB BLD-MCNC: 15.1 G/DL (ref 12–16)
IMM GRANULOCYTES # BLD AUTO: 0.03 K/UL (ref 0–0.11)
IMM GRANULOCYTES NFR BLD AUTO: 0.5 % (ref 0–0.9)
LDLC SERPL CALC-MCNC: 88 MG/DL
LYMPHOCYTES # BLD AUTO: 1.84 K/UL (ref 1–4.8)
LYMPHOCYTES NFR BLD: 30.2 % (ref 22–41)
MCH RBC QN AUTO: 29.4 PG (ref 27–33)
MCHC RBC AUTO-ENTMCNC: 33.3 G/DL (ref 33.6–35)
MCV RBC AUTO: 88.3 FL (ref 81.4–97.8)
MICROALBUMIN UR-MCNC: 2.6 MG/DL
MICROALBUMIN/CREAT UR: 14 MG/G (ref 0–30)
MONOCYTES # BLD AUTO: 0.35 K/UL (ref 0–0.85)
MONOCYTES NFR BLD AUTO: 5.7 % (ref 0–13.4)
NEUTROPHILS # BLD AUTO: 3.59 K/UL (ref 2–7.15)
NEUTROPHILS NFR BLD: 58.8 % (ref 44–72)
NRBC # BLD AUTO: 0 K/UL
NRBC BLD-RTO: 0 /100 WBC
PLATELET # BLD AUTO: 283 K/UL (ref 164–446)
PMV BLD AUTO: 9.6 FL (ref 9–12.9)
POTASSIUM SERPL-SCNC: 4 MMOL/L (ref 3.6–5.5)
PROT SERPL-MCNC: 7.6 G/DL (ref 6–8.2)
RBC # BLD AUTO: 5.14 M/UL (ref 4.2–5.4)
SODIUM SERPL-SCNC: 139 MMOL/L (ref 135–145)
TRIGL SERPL-MCNC: 126 MG/DL (ref 0–149)
TSH SERPL DL<=0.005 MIU/L-ACNC: 2.03 UIU/ML (ref 0.38–5.33)
VIT B12 SERPL-MCNC: 546 PG/ML (ref 211–911)
WBC # BLD AUTO: 6.1 K/UL (ref 4.8–10.8)

## 2023-02-13 PROCEDURE — 82306 VITAMIN D 25 HYDROXY: CPT

## 2023-02-13 PROCEDURE — 80061 LIPID PANEL: CPT

## 2023-02-13 PROCEDURE — 83036 HEMOGLOBIN GLYCOSYLATED A1C: CPT

## 2023-02-13 PROCEDURE — 85025 COMPLETE CBC W/AUTO DIFF WBC: CPT

## 2023-02-13 PROCEDURE — 84443 ASSAY THYROID STIM HORMONE: CPT

## 2023-02-13 PROCEDURE — 36415 COLL VENOUS BLD VENIPUNCTURE: CPT

## 2023-02-13 PROCEDURE — 82607 VITAMIN B-12: CPT

## 2023-02-13 PROCEDURE — 82043 UR ALBUMIN QUANTITATIVE: CPT

## 2023-02-13 PROCEDURE — 82570 ASSAY OF URINE CREATININE: CPT

## 2023-02-13 PROCEDURE — 80053 COMPREHEN METABOLIC PANEL: CPT

## 2023-02-14 ENCOUNTER — TELEPHONE (OUTPATIENT)
Dept: MEDICAL GROUP | Facility: IMAGING CENTER | Age: 58
End: 2023-02-14
Payer: COMMERCIAL

## 2023-02-14 NOTE — TELEPHONE ENCOUNTER
----- Message from Hien Frey P.A.-C. sent at 2/14/2023  7:29 AM PST -----  Please call patient to schedule lab review appointment, we will do Pap smear at that visit.

## 2023-02-15 LAB
EST. AVERAGE GLUCOSE BLD GHB EST-MCNC: 131 MG/DL
HBA1C MFR BLD: 6.2 % (ref 4–5.6)

## 2023-02-17 ENCOUNTER — APPOINTMENT (OUTPATIENT)
Dept: MEDICAL GROUP | Facility: IMAGING CENTER | Age: 58
End: 2023-02-17
Payer: COMMERCIAL

## 2023-02-21 ENCOUNTER — TELEPHONE (OUTPATIENT)
Dept: MEDICAL GROUP | Facility: IMAGING CENTER | Age: 58
End: 2023-02-21

## 2023-02-21 ENCOUNTER — HOSPITAL ENCOUNTER (OUTPATIENT)
Facility: MEDICAL CENTER | Age: 58
End: 2023-02-21
Attending: PHYSICIAN ASSISTANT
Payer: COMMERCIAL

## 2023-02-21 ENCOUNTER — OFFICE VISIT (OUTPATIENT)
Dept: MEDICAL GROUP | Facility: IMAGING CENTER | Age: 58
End: 2023-02-21
Payer: COMMERCIAL

## 2023-02-21 VITALS
BODY MASS INDEX: 23.89 KG/M2 | DIASTOLIC BLOOD PRESSURE: 68 MMHG | WEIGHT: 143.4 LBS | TEMPERATURE: 98.5 F | HEIGHT: 65 IN | HEART RATE: 80 BPM | SYSTOLIC BLOOD PRESSURE: 120 MMHG | OXYGEN SATURATION: 98 %

## 2023-02-21 DIAGNOSIS — N93.9 VAGINAL BLEEDING: ICD-10-CM

## 2023-02-21 DIAGNOSIS — Z01.419 ENCOUNTER FOR GYNECOLOGICAL EXAMINATION: ICD-10-CM

## 2023-02-21 DIAGNOSIS — Z11.51 SCREENING FOR HPV (HUMAN PAPILLOMAVIRUS): ICD-10-CM

## 2023-02-21 DIAGNOSIS — N72 CERVICITIS: ICD-10-CM

## 2023-02-21 DIAGNOSIS — Z12.4 SCREENING FOR CERVICAL CANCER: ICD-10-CM

## 2023-02-21 DIAGNOSIS — E55.9 VITAMIN D DEFICIENCY: ICD-10-CM

## 2023-02-21 DIAGNOSIS — Z23 NEED FOR VACCINATION: ICD-10-CM

## 2023-02-21 DIAGNOSIS — E11.9 TYPE 2 DIABETES MELLITUS WITHOUT COMPLICATION, WITHOUT LONG-TERM CURRENT USE OF INSULIN (HCC): ICD-10-CM

## 2023-02-21 PROCEDURE — 90472 IMMUNIZATION ADMIN EACH ADD: CPT | Performed by: PHYSICIAN ASSISTANT

## 2023-02-21 PROCEDURE — 88175 CYTOPATH C/V AUTO FLUID REDO: CPT

## 2023-02-21 PROCEDURE — 87591 N.GONORRHOEAE DNA AMP PROB: CPT

## 2023-02-21 PROCEDURE — 87491 CHLMYD TRACH DNA AMP PROBE: CPT

## 2023-02-21 PROCEDURE — 87660 TRICHOMONAS VAGIN DIR PROBE: CPT

## 2023-02-21 PROCEDURE — 90677 PCV20 VACCINE IM: CPT | Performed by: PHYSICIAN ASSISTANT

## 2023-02-21 PROCEDURE — 87624 HPV HI-RISK TYP POOLED RSLT: CPT

## 2023-02-21 PROCEDURE — 99396 PREV VISIT EST AGE 40-64: CPT | Mod: 25 | Performed by: PHYSICIAN ASSISTANT

## 2023-02-21 PROCEDURE — 87480 CANDIDA DNA DIR PROBE: CPT

## 2023-02-21 PROCEDURE — 90471 IMMUNIZATION ADMIN: CPT | Performed by: PHYSICIAN ASSISTANT

## 2023-02-21 PROCEDURE — 87510 GARDNER VAG DNA DIR PROBE: CPT

## 2023-02-21 PROCEDURE — 90746 HEPB VACCINE 3 DOSE ADULT IM: CPT | Performed by: PHYSICIAN ASSISTANT

## 2023-02-21 RX ORDER — ERGOCALCIFEROL 1.25 MG/1
50000 CAPSULE ORAL
Qty: 12 CAPSULE | Refills: 0 | Status: SHIPPED | OUTPATIENT
Start: 2023-02-21 | End: 2023-06-20

## 2023-02-21 ASSESSMENT — FIBROSIS 4 INDEX: FIB4 SCORE: 1.25

## 2023-02-21 NOTE — PATIENT INSTRUCTIONS
It was a pleasure meeting with you today at [facility]!    Your medical history/records and medications were reviewed today.     UPDATE on MyChart Results: If you have blood work, and/or imaging studies, or any other test or procedure completed, you will have access to results as soon as they become available in MyChart. Recently, these results will be available for review at the same time that your provider is able to see results!    This will likely mean you will see a result before your provider has had a chance to review and discuss with you.  Some results or care notes may be hard to understand and may be serious in nature.    We look at every result and your provider will contact you to explain what they mean and discuss appropriate next steps. Please allow for at least 72 business hours for chart and result review.     We prefer that you wait for your care team to contact you with your results.  Often, your provider will discuss your results with you at your next appointment. We look forward to continuing to partner with you in your care.    Please review my practice information below:    If you have any prescription refill requests, please send them via [employer] or discuss with your provider at the start of your office visits. Please allow 3-5 business days for lab and testing review and you will be contacted via [employer] with those results, or if advised to make a follow up appointment regarding those results, then please do so.     Once resulted, your lab/test/imaging results will show up automatically in your MyChart. Please wait for my interpretation and recommendations prior to viewing your results to avoid any unnecessary confusion or misinterpretation. I will address all of the lab values that I interpret as abnormal and message you accordingly on your MyChart. I will always send you a message about your results even if they are normal. If you do not hear back from me within 5-7 business  days after completing your tests, then please send me a message on "[employer] so I can obtain your results (especially if you went to an outside lab or imaging center - [facility], [facility], etc).     If you have any additional questions or concerns beyond my interpretation of your results, please make an appointment with me to discuss in further detail.    Please only use the "[employer] messaging system for questions regarding your most recent appointment or if advised to use otherwise (glucose or blood pressure reporting).     If you have any new problems or concerns, you must make an appointment to discuss. This includes any referral requests, lab requests (unless advised to notify me for pre-appt labs), medication side effects, or request for medication adjustments.     Please arrive 15 minutes prior to your appointment time to complete your check-in and intake with the medical assistant.      Thank you,    Hien Frey PA-C ([place])  Physician Assistant Certified  [facility]    -----------------------------------------------------------------    Attn: Patients of [facility]:    In an effort to continue to provide excellent and efficient care to our patients, it is vital that we continue to use our resources appropriately. With that, this is a reminder that "[employer] is used for prescription refill requests, test results, virtual visits, and chart review only.     Any new questions, concerns/conditions, lab/imaging requests, medication adjustments, new prescriptions, or referral requests do require an appointment (virtually or in person), unless discussed otherwise at your most recent appointment.     Thank you for your understanding,    [facility]

## 2023-02-21 NOTE — PROGRESS NOTES
Subjective:     CC:   Chief Complaint   Patient presents with    Lab Results    Gynecologic Exam       HPI:   Jennifer Saleh is a 57 y.o. patient who presents for annual gynecological exam. She is feeling well and denies any complaints.  Labs reviewed, A1c better.  Vitamin D deficient.  Due for hep B and Prevnar.    Ob-Gyn/ History:    Patient has GYN provider: no  /Para:   Last Pap Smear: Patient unsure  H/O of abnormal pap smears: Patient unsure  Sexually active: No  Post-menopausal bleeding: No  Urinary incontinence: No  Breast Cancer Screening: UTD  Colonoscopy: has order      has a past medical history of Diabetes (HCC), Heart murmur, Hypertension, and Palpitations.   has no past surgical history on file.  Family History   Problem Relation Age of Onset    Heart Disease Mother     Heart Disease Father     Heart Disease Sister         pacemaker    Stroke Sister     Ovarian Cancer Sister     Heart Disease Brother         transplant     Social History     Socioeconomic History    Marital status:      Spouse name: Not on file    Number of children: Not on file    Years of education: Not on file    Highest education level: Not on file   Occupational History    Not on file   Tobacco Use    Smoking status: Never    Smokeless tobacco: Never   Vaping Use    Vaping Use: Never used   Substance and Sexual Activity    Alcohol use: Never    Drug use: Never    Sexual activity: Not on file   Other Topics Concern    Not on file   Social History Narrative    From the [facility]. Moved in .    , no kids.     Social Determinants of Health     Financial Resource Strain: Not on file   Food Insecurity: Not on file   Transportation Needs: Not on file   Physical Activity: Not on file   Stress: Not on file   Social Connections: Not on file   Intimate Partner Violence: Not on file   Housing Stability: Not on file     Social History     Social History Narrative    From the [facility]. Moved in .     ", no kids.     Patient Active Problem List    Diagnosis Date Noted    Vitamin D deficiency 02/21/2023    Vaginal bleeding 02/21/2023    Cervicitis 02/21/2023    Chronic rhinitis 01/26/2023    Allergies 05/13/2022    Type 2 diabetes mellitus without complication, without long-term current use of insulin (HCC) 05/13/2022    Reactive depression 05/13/2022    Dyslipidemia 07/20/2021    Nonspecific abnormal electrocardiogram (ECG) (EKG) 07/20/2021    RBBB (right bundle branch block) 07/20/2021    Essential hypertension, benign 05/28/2020     Current Outpatient Medications   Medication Sig Dispense Refill    vitamin D2, Ergocalciferol, (DRISDOL) 1.25 MG (48409 UT) Cap capsule Take 1 Capsule by mouth every 7 days. 12 Capsule 0    amLODIPine (NORVASC) 5 MG Tab Take 1 Tablet by mouth every day. 90 Tablet 3    atorvastatin (LIPITOR) 40 MG Tab Take 1 Tablet by mouth every day. Take 1 tablet by mouth every night at bedtime 90 Tablet 3    metFORMIN ER (GLUCOPHAGE XR) 500 MG TABLET SR 24 HR Take 1 Tablet by mouth every day. 2 po q am 1 po q pm 90 Tablet 3    telmisartan (MICARDIS) 40 MG Tab Take 1 Tablet by mouth every day. 90 Tablet 3     No current facility-administered medications for this visit.     No Known Allergies    Review of Systems   Constitutional: Negative for fever, chills and malaise/fatigue.    Respiratory: Negative for cough and shortness of breath.  Cardiovascular: Negative for chest pain, palpitations, or leg swelling.   Gastrointestinal: Negative for nausea, vomiting, abdominal pain and diarrhea.   Genitourinary: Negative for dysuria and hematuria.   Skin: Negative for rash.    Psychiatric/Behavioral: Negative for depression.      Objective:     /68 (BP Location: Right arm, Patient Position: Sitting, BP Cuff Size: Adult)   Pulse 80   Temp 36.9 °C (98.5 °F) (Temporal)   Ht 1.651 m (5' 5\")   Wt 65 kg (143 lb 6.4 oz)   SpO2 98%   BMI 23.86 kg/m²   Body mass index is 23.86 kg/m².  Wt Readings " from Last 4 Encounters:   02/21/23 65 kg (143 lb 6.4 oz)   01/26/23 66.7 kg (147 lb)   05/13/22 62 kg (136 lb 9.6 oz)   02/23/22 62.2 kg (137 lb 1.6 oz)       Physical Exam:  Constitutional: Well-developed and well-nourished. Not diaphoretic. No distress.   Skin: Skin is warm and dry. No rash noted.  Head: Atraumatic without lesions.  Neck: Supple, trachea midline. Normal range of motion. No thyromegaly present. No lymphadenopathy--cervical or supraclavicular.  Cardiovascular: Regular rate and rhythm, S1 and S2 without murmur, rubs, or gallops.  Lungs: Normal inspiratory effort, CTA bilaterally, no wheezes/rhonchi/rales    Breast: Declined  Abdomen: Soft, non tender, and without distention. Active bowel sounds in all four quadrants. No rebound, guarding, masses or HSM.    :Perineum and external genitalia normal without rash.   Vagina with bloody discharge.   Cervix with friability and bloody discharge.  Bimanual exam without adnexal masses or cervical motion tenderness.    Extremities: No cyanosis, clubbing, erythema, nor edema. Distal pulses intact and symmetric.     A chaperone was offered to the patient during today's exam. Chaperone name: Manjula BEACH was present.    Assessment and Plan:     1. Encounter for gynecological examination  - Thinprep Pap with HPV; Future    2. Screening for cervical cancer  - Thinprep Pap with HPV; Future    3. Screening for HPV (human papillomavirus)  - Thinprep Pap with HPV; Future    4. Type 2 diabetes mellitus without complication, without long-term current use of insulin (HCC)  Chronic, controlled and stable. Continue current regimen.   - Referral for Retinal Screening Exam; Future    5. Vitamin D deficiency  - vitamin D2, Ergocalciferol, (DRISDOL) 1.25 MG (36343 UT) Cap capsule; Take 1 Capsule by mouth every 7 days.  Dispense: 12 Capsule; Refill: 0    6. Vaginal bleeding  Noted on exam, check pelvic ultrasound and cultures listed below.  Future gynecology evaluation if  persists.  - US-PELVIC COMPLETE (TRANSABDOMINAL/TRANSVAGINAL) (COMBO); Future  - VAGINAL PATHOGENS DNA PANEL; Future  - Chlamydia/GC, PCR (Genital/Anal swab); Future    7. Cervicitis  See above  - US-PELVIC COMPLETE (TRANSABDOMINAL/TRANSVAGINAL) (COMBO); Future  - VAGINAL PATHOGENS DNA PANEL; Future  - Chlamydia/GC, PCR (Genital/Anal swab); Future    8. Need for vaccination  - Pneumococcal Conjugate Vaccine 20-Valent (19 yrs+)  - Hepatitis B Vaccine Adult 20+      Follow-up: Return in about 6 weeks (around 4/4/2023) for Follow-up labs/tests.    Hien Frey PA-C ([place])  Physician Assistant Certified  [facility]

## 2023-02-22 LAB
CANDIDA DNA VAG QL PROBE+SIG AMP: NEGATIVE
G VAGINALIS DNA VAG QL PROBE+SIG AMP: NEGATIVE
T VAGINALIS DNA VAG QL PROBE+SIG AMP: NEGATIVE

## 2023-02-23 LAB
C TRACH DNA GENITAL QL NAA+PROBE: NEGATIVE
CYTOLOGY REG CYTOL: NORMAL
HPV HR 12 DNA CVX QL NAA+PROBE: NEGATIVE
HPV16 DNA SPEC QL NAA+PROBE: NEGATIVE
HPV18 DNA SPEC QL NAA+PROBE: NEGATIVE
N GONORRHOEA DNA GENITAL QL NAA+PROBE: NEGATIVE
SPECIMEN SOURCE: NORMAL
SPECIMEN SOURCE: NORMAL

## 2023-03-28 ENCOUNTER — HOSPITAL ENCOUNTER (EMERGENCY)
Facility: MEDICAL CENTER | Age: 58
End: 2023-03-29
Attending: EMERGENCY MEDICINE
Payer: COMMERCIAL

## 2023-03-28 DIAGNOSIS — J90 PLEURAL EFFUSION: ICD-10-CM

## 2023-03-28 DIAGNOSIS — R00.2 PALPITATIONS: ICD-10-CM

## 2023-03-28 DIAGNOSIS — R55 NEAR SYNCOPE: ICD-10-CM

## 2023-03-28 DIAGNOSIS — J18.9 PNEUMONIA OF RIGHT LOWER LOBE DUE TO INFECTIOUS ORGANISM: ICD-10-CM

## 2023-03-28 PROCEDURE — 36415 COLL VENOUS BLD VENIPUNCTURE: CPT

## 2023-03-28 PROCEDURE — 99284 EMERGENCY DEPT VISIT MOD MDM: CPT

## 2023-03-28 PROCEDURE — 93005 ELECTROCARDIOGRAM TRACING: CPT | Performed by: EMERGENCY MEDICINE

## 2023-03-28 PROCEDURE — 93005 ELECTROCARDIOGRAM TRACING: CPT

## 2023-03-28 ASSESSMENT — FIBROSIS 4 INDEX: FIB4 SCORE: 1.25

## 2023-03-29 ENCOUNTER — APPOINTMENT (OUTPATIENT)
Dept: RADIOLOGY | Facility: MEDICAL CENTER | Age: 58
End: 2023-03-29
Attending: EMERGENCY MEDICINE
Payer: COMMERCIAL

## 2023-03-29 VITALS
OXYGEN SATURATION: 97 % | TEMPERATURE: 97.7 F | WEIGHT: 143.3 LBS | HEIGHT: 65 IN | BODY MASS INDEX: 23.88 KG/M2 | DIASTOLIC BLOOD PRESSURE: 75 MMHG | HEART RATE: 68 BPM | SYSTOLIC BLOOD PRESSURE: 174 MMHG | RESPIRATION RATE: 18 BRPM

## 2023-03-29 LAB
ALBUMIN SERPL BCP-MCNC: 4.2 G/DL (ref 3.2–4.9)
ALBUMIN/GLOB SERPL: 1.1 G/DL
ALP SERPL-CCNC: 77 U/L (ref 30–99)
ALT SERPL-CCNC: 33 U/L (ref 2–50)
ANION GAP SERPL CALC-SCNC: 12 MMOL/L (ref 7–16)
AST SERPL-CCNC: 25 U/L (ref 12–45)
BASOPHILS # BLD AUTO: 0.5 % (ref 0–1.8)
BASOPHILS # BLD: 0.04 K/UL (ref 0–0.12)
BILIRUB SERPL-MCNC: 0.3 MG/DL (ref 0.1–1.5)
BUN SERPL-MCNC: 15 MG/DL (ref 8–22)
CALCIUM ALBUM COR SERPL-MCNC: 8.6 MG/DL (ref 8.5–10.5)
CALCIUM SERPL-MCNC: 8.8 MG/DL (ref 8.5–10.5)
CHLORIDE SERPL-SCNC: 104 MMOL/L (ref 96–112)
CO2 SERPL-SCNC: 22 MMOL/L (ref 20–33)
CREAT SERPL-MCNC: 0.78 MG/DL (ref 0.5–1.4)
EKG IMPRESSION: NORMAL
EOSINOPHIL # BLD AUTO: 0.32 K/UL (ref 0–0.51)
EOSINOPHIL NFR BLD: 3.8 % (ref 0–6.9)
ERYTHROCYTE [DISTWIDTH] IN BLOOD BY AUTOMATED COUNT: 40.7 FL (ref 35.9–50)
GFR SERPLBLD CREATININE-BSD FMLA CKD-EPI: 89 ML/MIN/1.73 M 2
GLOBULIN SER CALC-MCNC: 3.7 G/DL (ref 1.9–3.5)
GLUCOSE SERPL-MCNC: 112 MG/DL (ref 65–99)
HCT VFR BLD AUTO: 45.9 % (ref 37–47)
HGB BLD-MCNC: 15.1 G/DL (ref 12–16)
IMM GRANULOCYTES # BLD AUTO: 0.05 K/UL (ref 0–0.11)
IMM GRANULOCYTES NFR BLD AUTO: 0.6 % (ref 0–0.9)
LYMPHOCYTES # BLD AUTO: 2.53 K/UL (ref 1–4.8)
LYMPHOCYTES NFR BLD: 30.2 % (ref 22–41)
MCH RBC QN AUTO: 28.7 PG (ref 27–33)
MCHC RBC AUTO-ENTMCNC: 32.9 G/DL (ref 33.6–35)
MCV RBC AUTO: 87.3 FL (ref 81.4–97.8)
MONOCYTES # BLD AUTO: 0.45 K/UL (ref 0–0.85)
MONOCYTES NFR BLD AUTO: 5.4 % (ref 0–13.4)
NEUTROPHILS # BLD AUTO: 4.99 K/UL (ref 2–7.15)
NEUTROPHILS NFR BLD: 59.5 % (ref 44–72)
NRBC # BLD AUTO: 0 K/UL
NRBC BLD-RTO: 0 /100 WBC
PLATELET # BLD AUTO: 247 K/UL (ref 164–446)
PMV BLD AUTO: 9.1 FL (ref 9–12.9)
POTASSIUM SERPL-SCNC: 3.9 MMOL/L (ref 3.6–5.5)
PROT SERPL-MCNC: 7.9 G/DL (ref 6–8.2)
RBC # BLD AUTO: 5.26 M/UL (ref 4.2–5.4)
SODIUM SERPL-SCNC: 138 MMOL/L (ref 135–145)
TROPONIN T SERPL-MCNC: 7 NG/L (ref 6–19)
WBC # BLD AUTO: 8.4 K/UL (ref 4.8–10.8)

## 2023-03-29 PROCEDURE — 85025 COMPLETE CBC W/AUTO DIFF WBC: CPT

## 2023-03-29 PROCEDURE — 84484 ASSAY OF TROPONIN QUANT: CPT

## 2023-03-29 PROCEDURE — 71045 X-RAY EXAM CHEST 1 VIEW: CPT

## 2023-03-29 PROCEDURE — 700111 HCHG RX REV CODE 636 W/ 250 OVERRIDE (IP): Performed by: EMERGENCY MEDICINE

## 2023-03-29 PROCEDURE — 80053 COMPREHEN METABOLIC PANEL: CPT

## 2023-03-29 RX ORDER — ONDANSETRON 4 MG/1
4 TABLET, ORALLY DISINTEGRATING ORAL ONCE
Status: COMPLETED | OUTPATIENT
Start: 2023-03-29 | End: 2023-03-29

## 2023-03-29 RX ORDER — AZITHROMYCIN 250 MG/1
TABLET, FILM COATED ORAL
Qty: 6 TABLET | Refills: 0 | Status: SHIPPED | OUTPATIENT
Start: 2023-03-29 | End: 2023-03-29 | Stop reason: SDUPTHER

## 2023-03-29 RX ORDER — AZITHROMYCIN 250 MG/1
TABLET, FILM COATED ORAL
Qty: 6 TABLET | Refills: 0 | Status: ACTIVE | OUTPATIENT
Start: 2023-03-29 | End: 2023-06-20

## 2023-03-29 RX ADMIN — ONDANSETRON 4 MG: 4 TABLET, ORALLY DISINTEGRATING ORAL at 04:49

## 2023-03-29 NOTE — ED PROVIDER NOTES
ED Provider Note    CHIEF COMPLAINT  Chief Complaint   Patient presents with    Palpitations    Dizziness    Nausea     Pt BIB EMS from work for sudden onset of palpitations/lightheadedness/nausea. Pt given 4mg Zofran in route with relief. BS-125. Pt c/o of dizziness but no palpitations or nausea at this time.         EXTERNAL RECORDS REVIEWED  Outpatient Notes family medicine notes from within the last couple months    HPI/ROS  LIMITATION TO HISTORY   Select: : None    OUTSIDE HISTORIAN(S):  EMS      Jennifer Saleh is a 57 y.o. female who presents to the emergency department by EMS from work.  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  States that there is a lot of pressure to perform well while working hard she began having some palpitations feeling lightheaded and feeling somewhat nauseous.  States she felt as though she might pass out but she did not pass out.  She reports no headache.  She reports that she was dizzy while having palpitations but the dizziness had resolved prior to presentation here.  She is not having any further chest pain or palpitations no shortness of breath she reports that she has had a minimal cough recently no fevers cough has been nonproductive no abdominal pain no problems with urination or bowel movements no other acute symptom changes or concerns.    PAST MEDICAL HISTORY   has a past medical history of Diabetes (HCC), Heart murmur, Hypertension, and Palpitations.    SURGICAL HISTORY  patient denies any surgical history    FAMILY HISTORY  Family History   Problem Relation Age of Onset    Heart Disease Mother     Heart Disease Father     Heart Disease Sister         pacemaker    Stroke Sister     Ovarian Cancer Sister     Heart Disease Brother         transplant       SOCIAL HISTORY  Social History     Tobacco Use    Smoking status: Never    Smokeless tobacco: Never   Vaping Use    Vaping Use: Never used   Substance and Sexual Activity    Alcohol use: Never     "Drug use: Never    Sexual activity: Not on file       CURRENT MEDICATIONS  Home Medications       Reviewed by Umm Eisenberg R.N. (Registered Nurse) on 03/29/23 at 0002  Med List Status: <None>     Medication Last Dose Status   amLODIPine (NORVASC) 5 MG Tab  Active   atorvastatin (LIPITOR) 40 MG Tab  Active   metFORMIN ER (GLUCOPHAGE XR) 500 MG TABLET SR 24 HR  Active   telmisartan (MICARDIS) 40 MG Tab  Active   vitamin D2, Ergocalciferol, (DRISDOL) 1.25 MG (83364 UT) Cap capsule  Active                    ALLERGIES  No Known Allergies    PHYSICAL EXAM  VITAL SIGNS: BP (!) 157/74   Pulse 71   Temp 36.4 °C (97.5 °F)   Resp 17   Ht 1.651 m (5' 5\")   Wt 65 kg (143 lb 4.8 oz)   SpO2 97%   BMI 23.85 kg/m²    Pulse ox interpretation: I interpret this pulse ox as normal.  Constitutional: Alert and oriented x 3, minimal Distress  HEENT: Atraumatic normocephalic, pupils are equal round reactive to light extraocular movements are intact. The nares is clear, external ears are normal, mouth shows moist mucous membranes normal dentition for age  Neck: Supple, no JVD no tracheal deviation  Cardiovascular: Regular rate and rhythm no murmur rub or gallop 2+ pulses peripherally x4  Thorax & Lungs: No respiratory distress, no wheezes rales or rhonchi, No chest tenderness.   GI: Soft nontender nondistended positive bowel sounds, no peritoneal signs  Skin: Warm dry no acute rash or lesion  Musculoskeletal: Moving all extremities with full range and 5 of 5 strength no acute  deformity  Neurologic: Cranial nerves III through XII are grossly intact no sensory deficit no cerebellar dysfunction   Psychiatric: Appropriate affect for situation at this time          DIAGNOSTIC STUDIES / PROCEDURES  Results for orders placed or performed during the hospital encounter of 03/28/23   CBC WITH DIFFERENTIAL   Result Value Ref Range    WBC 8.4 4.8 - 10.8 K/uL    RBC 5.26 4.20 - 5.40 M/uL    Hemoglobin 15.1 12.0 - 16.0 g/dL    Hematocrit 45.9 " 37.0 - 47.0 %    MCV 87.3 81.4 - 97.8 fL    MCH 28.7 27.0 - 33.0 pg    MCHC 32.9 (L) 33.6 - 35.0 g/dL    RDW 40.7 35.9 - 50.0 fL    Platelet Count 247 164 - 446 K/uL    MPV 9.1 9.0 - 12.9 fL    Neutrophils-Polys 59.50 44.00 - 72.00 %    Lymphocytes 30.20 22.00 - 41.00 %    Monocytes 5.40 0.00 - 13.40 %    Eosinophils 3.80 0.00 - 6.90 %    Basophils 0.50 0.00 - 1.80 %    Immature Granulocytes 0.60 0.00 - 0.90 %    Nucleated RBC 0.00 /100 WBC    Neutrophils (Absolute) 4.99 2.00 - 7.15 K/uL    Lymphs (Absolute) 2.53 1.00 - 4.80 K/uL    Monos (Absolute) 0.45 0.00 - 0.85 K/uL    Eos (Absolute) 0.32 0.00 - 0.51 K/uL    Baso (Absolute) 0.04 0.00 - 0.12 K/uL    Immature Granulocytes (abs) 0.05 0.00 - 0.11 K/uL    NRBC (Absolute) 0.00 K/uL   COMP METABOLIC PANEL   Result Value Ref Range    Sodium 138 135 - 145 mmol/L    Potassium 3.9 3.6 - 5.5 mmol/L    Chloride 104 96 - 112 mmol/L    Co2 22 20 - 33 mmol/L    Anion Gap 12.0 7.0 - 16.0    Glucose 112 (H) 65 - 99 mg/dL    Bun 15 8 - 22 mg/dL    Creatinine 0.78 0.50 - 1.40 mg/dL    Calcium 8.8 8.5 - 10.5 mg/dL    AST(SGOT) 25 12 - 45 U/L    ALT(SGPT) 33 2 - 50 U/L    Alkaline Phosphatase 77 30 - 99 U/L    Total Bilirubin 0.3 0.1 - 1.5 mg/dL    Albumin 4.2 3.2 - 4.9 g/dL    Total Protein 7.9 6.0 - 8.2 g/dL    Globulin 3.7 (H) 1.9 - 3.5 g/dL    A-G Ratio 1.1 g/dL   TROPONIN   Result Value Ref Range    Troponin T 7 6 - 19 ng/L   CORRECTED CALCIUM   Result Value Ref Range    Correct Calcium 8.6 8.5 - 10.5 mg/dL   ESTIMATED GFR   Result Value Ref Range    GFR (CKD-EPI) 89 >60 mL/min/1.73 m 2   EKG   Result Value Ref Range    Report       [facility] Emergency Dept.    Test Date:  2023  Pt Name:    JOSE LUIS SAAVEDRA               Department: ER  MRN:        [id number]                      Room:       [facility]  Gender:     Female                       Technician: [id number]  :        1965                   Requested By:ER TRIAGE PROTOCOL  Order #:    [id number]                     Reading MD: EDGAR BRAY MD    Measurements  Intervals                                Axis  Rate:       71                           P:          49  VA:         142                          QRS:        32  QRSD:       134                          T:          28  QT:         429  QTc:        467    Interpretive Statements  Sinus rhythm  Right bundle branch block  Compared to ECG 11/17/2021 20:45:23  No significant changes  Electronically Signed On 3- 2:53:40 PDT by EDGAR BRAY MD           RADIOLOGY  DX-CHEST-PORTABLE (1 VIEW)   Final Result      Probable small left pleural effusion with possible associated atelectasis and/or small infiltrate.            COURSE & MEDICAL DECISION MAKING    ED Observation Status? Yes; I am placing the patient in to an observation status due to a diagnostic uncertainty as well as therapeutic intensity. Patient placed in observation status at 00:40 AM, 3/29/2023.     Observation plan is as follows: Patient have basic laboratory evaluation and EKG chest x-ray.  She not having any headache at this time no ongoing dizziness at this time no indication for CT head.       Upon Reevaluation, the patient's condition has: Improved; and will be discharged.    Patient discharged from ED Observation status at 04:52 (Time) 3/29/23 (Date).     INITIAL ASSESSMENT, COURSE AND PLAN    Care Narrative: 57-year-old female was at work today had some palpitations some lightheadedness some dizziness.  Symptoms had resolved upon presentation here no headache no ongoing dizziness her EKG shows no arrhythmic changes her troponin is negative she does have some minor nausea while here that which was responsive to Zofran.  Chest x-ray shows possible left lower lobe infiltrate and some minimal left-sided pleural effusion she does report having moderate cough recently nonproductive no other concerning symptoms she will be placed on a azithromycin for community-acquired pneumonia she  is given instructions to follow-up with primary care for reevaluation and repeat chest x-ray in a couple weeks to assure resolution.  Patient will return here for any worsening chest pain palpitations dizziness shortness of breath any other acute symptom change or concern otherwise discharged in stable and improved condition.      HTN/IDDM FOLLOW UP:  The patient has known hypertension and is being followed by their primary care doctor      ADDITIONAL PROBLEM LIST    DISPOSITION AND DISCUSSIONS  I have discussed management of the patient with the following physicians and JOE's:      Discussion of management with other QHP or appropriate source(s):      Escalation of care considered, and ultimately not performed:acute inpatient care management, however at this time, the patient is most appropriate for outpatient management    Barriers to care at this time, including but not limited to: .     Decision tools and prescription drugs considered including, but not limited to: Antibiotics for left lower lobe infiltrate and minor pleural effusion .    FINAL DIAGNOSIS  1. Palpitations Inactive   2. Near syncope Inactive   3. Pleural effusion    4. Pneumonia of right lower lobe due to infectious organism           Electronically signed by: Clark Yang M.D., 3/29/2023 12:15 AM

## 2023-03-29 NOTE — ED TRIAGE NOTES
Chief Complaint   Patient presents with    Palpitations    Dizziness    Nausea     Pt BIB EMS from work for sudden onset of palpitations/lightheadedness/nausea. Pt given 4mg Zofran in route with relief. BS-125. Pt c/o of dizziness but no palpitations or nausea at this time.       Chief Complaint   Patient presents with    Palpitations    Dizziness    Nausea     Pt BIB EMS from work for sudden onset of palpitations/lightheadedness/nausea. Pt given 4mg Zofran in route with relief. BS-125. Pt c/o of dizziness but no palpitations or nausea at this time.

## 2023-03-29 NOTE — ED NOTES
Pt ambulated with steady gait. Patient discharged per orders. PT verbalized understanding of discharge instructions. Pt leaving in stable condition with all belongings.

## 2023-04-01 DIAGNOSIS — E78.5 DYSLIPIDEMIA: ICD-10-CM

## 2023-04-03 NOTE — TELEPHONE ENCOUNTER
Is the patient due for a refill? Yes- courtesy refill    Was the patient seen the past year? No    Date of last office visit: 2/23/2022    Does the patient have an upcoming appointment?  No   If yes, When?     Provider to refill:OG    Does the patients insurance require a 100 day supply?  No

## 2023-04-04 DIAGNOSIS — E78.5 DYSLIPIDEMIA: ICD-10-CM

## 2023-04-04 RX ORDER — ATORVASTATIN CALCIUM 40 MG/1
TABLET, FILM COATED ORAL
Qty: 90 TABLET | Refills: 0 | Status: SHIPPED | OUTPATIENT
Start: 2023-04-04 | End: 2023-06-20

## 2023-04-04 RX ORDER — ATORVASTATIN CALCIUM 40 MG/1
40 TABLET, FILM COATED ORAL DAILY
Qty: 90 TABLET | Refills: 3 | Status: ON HOLD | OUTPATIENT
Start: 2023-04-04 | End: 2023-06-22

## 2023-04-06 RX ORDER — TELMISARTAN 40 MG/1
40 TABLET ORAL DAILY
Qty: 90 TABLET | Refills: 3 | Status: SHIPPED | OUTPATIENT
Start: 2023-04-06 | End: 2023-06-20

## 2023-04-17 ENCOUNTER — HOSPITAL ENCOUNTER (OUTPATIENT)
Dept: RADIOLOGY | Facility: MEDICAL CENTER | Age: 58
End: 2023-04-17
Attending: PHYSICIAN ASSISTANT
Payer: COMMERCIAL

## 2023-04-17 DIAGNOSIS — N72 CERVICITIS: ICD-10-CM

## 2023-04-17 DIAGNOSIS — N93.9 VAGINAL BLEEDING: ICD-10-CM

## 2023-04-17 PROCEDURE — 76830 TRANSVAGINAL US NON-OB: CPT

## 2023-05-19 ENCOUNTER — APPOINTMENT (OUTPATIENT)
Dept: MEDICAL GROUP | Facility: IMAGING CENTER | Age: 58
End: 2023-05-19
Payer: COMMERCIAL

## 2023-06-20 ENCOUNTER — HOSPITAL ENCOUNTER (OUTPATIENT)
Facility: MEDICAL CENTER | Age: 58
End: 2023-06-22
Attending: EMERGENCY MEDICINE | Admitting: INTERNAL MEDICINE
Payer: COMMERCIAL

## 2023-06-20 ENCOUNTER — APPOINTMENT (OUTPATIENT)
Dept: RADIOLOGY | Facility: MEDICAL CENTER | Age: 58
End: 2023-06-20
Attending: EMERGENCY MEDICINE
Payer: COMMERCIAL

## 2023-06-20 DIAGNOSIS — E78.5 DYSLIPIDEMIA: ICD-10-CM

## 2023-06-20 DIAGNOSIS — I10 ESSENTIAL HYPERTENSION, BENIGN: ICD-10-CM

## 2023-06-20 DIAGNOSIS — R07.2 PRECORDIAL CHEST PAIN: ICD-10-CM

## 2023-06-20 PROBLEM — K75.9 HEPATITIS: Status: ACTIVE | Noted: 2023-06-20

## 2023-06-20 PROBLEM — R07.9 CHEST PAIN: Status: ACTIVE | Noted: 2023-06-20

## 2023-06-20 PROBLEM — I16.0 HYPERTENSIVE URGENCY: Status: ACTIVE | Noted: 2023-06-20

## 2023-06-20 LAB
ALBUMIN SERPL BCP-MCNC: 4.3 G/DL (ref 3.2–4.9)
ALBUMIN/GLOB SERPL: 1.2 G/DL
ALP SERPL-CCNC: 173 U/L (ref 30–99)
ALT SERPL-CCNC: 261 U/L (ref 2–50)
ANION GAP SERPL CALC-SCNC: 12 MMOL/L (ref 7–16)
AST SERPL-CCNC: 512 U/L (ref 12–45)
BASOPHILS # BLD AUTO: 0.5 % (ref 0–1.8)
BASOPHILS # BLD: 0.04 K/UL (ref 0–0.12)
BILIRUB SERPL-MCNC: 1.1 MG/DL (ref 0.1–1.5)
BUN SERPL-MCNC: 12 MG/DL (ref 8–22)
CALCIUM ALBUM COR SERPL-MCNC: 8.7 MG/DL (ref 8.5–10.5)
CALCIUM SERPL-MCNC: 8.9 MG/DL (ref 8.5–10.5)
CHLORIDE SERPL-SCNC: 104 MMOL/L (ref 96–112)
CK SERPL-CCNC: 125 U/L (ref 0–154)
CO2 SERPL-SCNC: 25 MMOL/L (ref 20–33)
CREAT SERPL-MCNC: 0.79 MG/DL (ref 0.5–1.4)
EKG IMPRESSION: NORMAL
EOSINOPHIL # BLD AUTO: 0.41 K/UL (ref 0–0.51)
EOSINOPHIL NFR BLD: 5.5 % (ref 0–6.9)
ERYTHROCYTE [DISTWIDTH] IN BLOOD BY AUTOMATED COUNT: 41.6 FL (ref 35.9–50)
GFR SERPLBLD CREATININE-BSD FMLA CKD-EPI: 87 ML/MIN/1.73 M 2
GGT SERPL-CCNC: 755 U/L (ref 7–34)
GLOBULIN SER CALC-MCNC: 3.5 G/DL (ref 1.9–3.5)
GLUCOSE SERPL-MCNC: 136 MG/DL (ref 65–99)
HAV IGM SERPL QL IA: NORMAL
HBV CORE IGM SER QL: NORMAL
HBV SURFACE AG SER QL: NORMAL
HCT VFR BLD AUTO: 44.6 % (ref 37–47)
HCV AB SER QL: NORMAL
HGB BLD-MCNC: 14.9 G/DL (ref 12–16)
IMM GRANULOCYTES # BLD AUTO: 0.03 K/UL (ref 0–0.11)
IMM GRANULOCYTES NFR BLD AUTO: 0.4 % (ref 0–0.9)
LDH SERPL L TO P-CCNC: 567 U/L (ref 107–266)
LYMPHOCYTES # BLD AUTO: 1.31 K/UL (ref 1–4.8)
LYMPHOCYTES NFR BLD: 17.5 % (ref 22–41)
MCH RBC QN AUTO: 29.3 PG (ref 27–33)
MCHC RBC AUTO-ENTMCNC: 33.4 G/DL (ref 32.2–35.5)
MCV RBC AUTO: 87.8 FL (ref 81.4–97.8)
MONOCYTES # BLD AUTO: 0.43 K/UL (ref 0–0.85)
MONOCYTES NFR BLD AUTO: 5.7 % (ref 0–13.4)
NEUTROPHILS # BLD AUTO: 5.28 K/UL (ref 1.82–7.42)
NEUTROPHILS NFR BLD: 70.4 % (ref 44–72)
NRBC # BLD AUTO: 0 K/UL
NRBC BLD-RTO: 0 /100 WBC (ref 0–0.2)
PLATELET # BLD AUTO: 262 K/UL (ref 164–446)
PMV BLD AUTO: 9.3 FL (ref 9–12.9)
POTASSIUM SERPL-SCNC: 3.7 MMOL/L (ref 3.6–5.5)
PROT SERPL-MCNC: 7.8 G/DL (ref 6–8.2)
RBC # BLD AUTO: 5.08 M/UL (ref 4.2–5.4)
SODIUM SERPL-SCNC: 141 MMOL/L (ref 135–145)
TROPONIN T SERPL-MCNC: 6 NG/L (ref 6–19)
TROPONIN T SERPL-MCNC: 7 NG/L (ref 6–19)
WBC # BLD AUTO: 7.5 K/UL (ref 4.8–10.8)

## 2023-06-20 PROCEDURE — 82550 ASSAY OF CK (CPK): CPT

## 2023-06-20 PROCEDURE — 85025 COMPLETE CBC W/AUTO DIFF WBC: CPT

## 2023-06-20 PROCEDURE — 99223 1ST HOSP IP/OBS HIGH 75: CPT | Performed by: INTERNAL MEDICINE

## 2023-06-20 PROCEDURE — 36415 COLL VENOUS BLD VENIPUNCTURE: CPT

## 2023-06-20 PROCEDURE — 86140 C-REACTIVE PROTEIN: CPT

## 2023-06-20 PROCEDURE — 84484 ASSAY OF TROPONIN QUANT: CPT

## 2023-06-20 PROCEDURE — 83690 ASSAY OF LIPASE: CPT

## 2023-06-20 PROCEDURE — 80074 ACUTE HEPATITIS PANEL: CPT

## 2023-06-20 PROCEDURE — 93005 ELECTROCARDIOGRAM TRACING: CPT | Performed by: EMERGENCY MEDICINE

## 2023-06-20 PROCEDURE — 83615 LACTATE (LD) (LDH) ENZYME: CPT

## 2023-06-20 PROCEDURE — G0378 HOSPITAL OBSERVATION PER HR: HCPCS

## 2023-06-20 PROCEDURE — 71045 X-RAY EXAM CHEST 1 VIEW: CPT

## 2023-06-20 PROCEDURE — A9270 NON-COVERED ITEM OR SERVICE: HCPCS | Performed by: INTERNAL MEDICINE

## 2023-06-20 PROCEDURE — 99285 EMERGENCY DEPT VISIT HI MDM: CPT

## 2023-06-20 PROCEDURE — 82977 ASSAY OF GGT: CPT

## 2023-06-20 PROCEDURE — 700102 HCHG RX REV CODE 250 W/ 637 OVERRIDE(OP): Performed by: INTERNAL MEDICINE

## 2023-06-20 PROCEDURE — 80053 COMPREHEN METABOLIC PANEL: CPT

## 2023-06-20 RX ORDER — ONDANSETRON 4 MG/1
4 TABLET, ORALLY DISINTEGRATING ORAL EVERY 4 HOURS PRN
Status: DISCONTINUED | OUTPATIENT
Start: 2023-06-20 | End: 2023-06-22 | Stop reason: HOSPADM

## 2023-06-20 RX ORDER — AMLODIPINE BESYLATE 10 MG/1
10 TABLET ORAL DAILY
Status: DISCONTINUED | OUTPATIENT
Start: 2023-06-21 | End: 2023-06-22 | Stop reason: HOSPADM

## 2023-06-20 RX ORDER — AMINOPHYLLINE 25 MG/ML
100 INJECTION, SOLUTION INTRAVENOUS
Status: DISCONTINUED | OUTPATIENT
Start: 2023-06-20 | End: 2023-06-22 | Stop reason: HOSPADM

## 2023-06-20 RX ORDER — ENOXAPARIN SODIUM 100 MG/ML
40 INJECTION SUBCUTANEOUS DAILY
Status: DISCONTINUED | OUTPATIENT
Start: 2023-06-20 | End: 2023-06-22 | Stop reason: HOSPADM

## 2023-06-20 RX ORDER — ASPIRIN 325 MG
325 TABLET ORAL DAILY
Status: DISCONTINUED | OUTPATIENT
Start: 2023-06-21 | End: 2023-06-21

## 2023-06-20 RX ORDER — ASPIRIN 81 MG/1
324 TABLET, CHEWABLE ORAL DAILY
Status: DISCONTINUED | OUTPATIENT
Start: 2023-06-21 | End: 2023-06-21

## 2023-06-20 RX ORDER — BISACODYL 10 MG
10 SUPPOSITORY, RECTAL RECTAL
Status: DISCONTINUED | OUTPATIENT
Start: 2023-06-20 | End: 2023-06-22 | Stop reason: HOSPADM

## 2023-06-20 RX ORDER — POLYETHYLENE GLYCOL 3350 17 G/17G
1 POWDER, FOR SOLUTION ORAL
Status: DISCONTINUED | OUTPATIENT
Start: 2023-06-20 | End: 2023-06-22 | Stop reason: HOSPADM

## 2023-06-20 RX ORDER — MORPHINE SULFATE 4 MG/ML
2 INJECTION INTRAVENOUS
Status: DISCONTINUED | OUTPATIENT
Start: 2023-06-20 | End: 2023-06-22 | Stop reason: HOSPADM

## 2023-06-20 RX ORDER — REGADENOSON 0.08 MG/ML
0.4 INJECTION, SOLUTION INTRAVENOUS
Status: COMPLETED | OUTPATIENT
Start: 2023-06-20 | End: 2023-06-21

## 2023-06-20 RX ORDER — OXYCODONE HYDROCHLORIDE 5 MG/1
5 TABLET ORAL
Status: DISCONTINUED | OUTPATIENT
Start: 2023-06-20 | End: 2023-06-22 | Stop reason: HOSPADM

## 2023-06-20 RX ORDER — PROMETHAZINE HYDROCHLORIDE 25 MG/1
12.5-25 SUPPOSITORY RECTAL EVERY 4 HOURS PRN
Status: DISCONTINUED | OUTPATIENT
Start: 2023-06-20 | End: 2023-06-22 | Stop reason: HOSPADM

## 2023-06-20 RX ORDER — LABETALOL HYDROCHLORIDE 5 MG/ML
10 INJECTION, SOLUTION INTRAVENOUS EVERY 4 HOURS PRN
Status: DISCONTINUED | OUTPATIENT
Start: 2023-06-20 | End: 2023-06-22 | Stop reason: HOSPADM

## 2023-06-20 RX ORDER — ASPIRIN 300 MG/1
300 SUPPOSITORY RECTAL DAILY
Status: DISCONTINUED | OUTPATIENT
Start: 2023-06-21 | End: 2023-06-21

## 2023-06-20 RX ORDER — ONDANSETRON 2 MG/ML
4 INJECTION INTRAMUSCULAR; INTRAVENOUS EVERY 4 HOURS PRN
Status: DISCONTINUED | OUTPATIENT
Start: 2023-06-20 | End: 2023-06-22 | Stop reason: HOSPADM

## 2023-06-20 RX ORDER — OXYCODONE HYDROCHLORIDE 5 MG/1
2.5 TABLET ORAL
Status: DISCONTINUED | OUTPATIENT
Start: 2023-06-20 | End: 2023-06-22 | Stop reason: HOSPADM

## 2023-06-20 RX ORDER — TELMISARTAN 40 MG/1
40 TABLET ORAL DAILY
Status: DISCONTINUED | OUTPATIENT
Start: 2023-06-21 | End: 2023-06-20

## 2023-06-20 RX ORDER — AMOXICILLIN 250 MG
2 CAPSULE ORAL 2 TIMES DAILY
Status: DISCONTINUED | OUTPATIENT
Start: 2023-06-20 | End: 2023-06-22 | Stop reason: HOSPADM

## 2023-06-20 RX ORDER — PROMETHAZINE HYDROCHLORIDE 25 MG/1
12.5-25 TABLET ORAL EVERY 4 HOURS PRN
Status: DISCONTINUED | OUTPATIENT
Start: 2023-06-20 | End: 2023-06-22 | Stop reason: HOSPADM

## 2023-06-20 RX ORDER — AMLODIPINE BESYLATE 5 MG/1
5 TABLET ORAL DAILY
Status: DISCONTINUED | OUTPATIENT
Start: 2023-06-21 | End: 2023-06-20

## 2023-06-20 RX ORDER — PROCHLORPERAZINE EDISYLATE 5 MG/ML
5-10 INJECTION INTRAMUSCULAR; INTRAVENOUS EVERY 4 HOURS PRN
Status: DISCONTINUED | OUTPATIENT
Start: 2023-06-20 | End: 2023-06-22 | Stop reason: HOSPADM

## 2023-06-20 RX ADMIN — SENNOSIDES AND DOCUSATE SODIUM 2 TABLET: 50; 8.6 TABLET ORAL at 23:59

## 2023-06-20 ASSESSMENT — FIBROSIS 4 INDEX
FIB4 SCORE: 6.77
FIB4 SCORE: 0.99

## 2023-06-20 ASSESSMENT — ENCOUNTER SYMPTOMS
SHORTNESS OF BREATH: 1
NERVOUS/ANXIOUS: 0
SPEECH CHANGE: 0
SPUTUM PRODUCTION: 0
WEIGHT LOSS: 0
BLURRED VISION: 0
POLYDIPSIA: 0
COUGH: 0
NECK PAIN: 0
PALPITATIONS: 0
TREMORS: 0
VOMITING: 0
HEMOPTYSIS: 0
HEARTBURN: 0
NAUSEA: 1
HEADACHES: 0
FOCAL WEAKNESS: 0
BACK PAIN: 0
PHOTOPHOBIA: 0
FEVER: 0
HALLUCINATIONS: 0
FLANK PAIN: 0
ORTHOPNEA: 0
CHILLS: 0
BRUISES/BLEEDS EASILY: 0
SENSORY CHANGE: 1
DOUBLE VISION: 0

## 2023-06-20 ASSESSMENT — PAIN DESCRIPTION - PAIN TYPE
TYPE: ACUTE PAIN
TYPE: ACUTE PAIN

## 2023-06-20 ASSESSMENT — LIFESTYLE VARIABLES
EVER HAD A DRINK FIRST THING IN THE MORNING TO STEADY YOUR NERVES TO GET RID OF A HANGOVER: NO
HAVE YOU EVER FELT YOU SHOULD CUT DOWN ON YOUR DRINKING: NO
TOTAL SCORE: 0
HOW MANY TIMES IN THE PAST YEAR HAVE YOU HAD 5 OR MORE DRINKS IN A DAY: 0
EVER FELT BAD OR GUILTY ABOUT YOUR DRINKING: NO
AVERAGE NUMBER OF DAYS PER WEEK YOU HAVE A DRINK CONTAINING ALCOHOL: 0
ON A TYPICAL DAY WHEN YOU DRINK ALCOHOL HOW MANY DRINKS DO YOU HAVE: 0
HAVE PEOPLE ANNOYED YOU BY CRITICIZING YOUR DRINKING: NO
SUBSTANCE_ABUSE: 0
ALCOHOL_USE: NO
TOTAL SCORE: 0
TOTAL SCORE: 0
CONSUMPTION TOTAL: NEGATIVE

## 2023-06-20 ASSESSMENT — PATIENT HEALTH QUESTIONNAIRE - PHQ9
2. FEELING DOWN, DEPRESSED, IRRITABLE, OR HOPELESS: NOT AT ALL
SUM OF ALL RESPONSES TO PHQ9 QUESTIONS 1 AND 2: 0
1. LITTLE INTEREST OR PLEASURE IN DOING THINGS: NOT AT ALL

## 2023-06-21 ENCOUNTER — APPOINTMENT (OUTPATIENT)
Dept: RADIOLOGY | Facility: MEDICAL CENTER | Age: 58
End: 2023-06-21
Attending: INTERNAL MEDICINE
Payer: COMMERCIAL

## 2023-06-21 PROBLEM — R10.13 EPIGASTRIC PAIN: Status: ACTIVE | Noted: 2023-06-20

## 2023-06-21 LAB
ALBUMIN SERPL BCP-MCNC: 3.7 G/DL (ref 3.2–4.9)
ALBUMIN/GLOB SERPL: 1.2 G/DL
ALP SERPL-CCNC: 159 U/L (ref 30–99)
ALT SERPL-CCNC: 425 U/L (ref 2–50)
ANION GAP SERPL CALC-SCNC: 14 MMOL/L (ref 7–16)
APAP SERPL-MCNC: <5 UG/ML (ref 10–30)
AST SERPL-CCNC: 704 U/L (ref 12–45)
BILIRUB SERPL-MCNC: 1 MG/DL (ref 0.1–1.5)
BUN SERPL-MCNC: 11 MG/DL (ref 8–22)
CALCIUM ALBUM COR SERPL-MCNC: 8.7 MG/DL (ref 8.5–10.5)
CALCIUM SERPL-MCNC: 8.5 MG/DL (ref 8.5–10.5)
CHLORIDE SERPL-SCNC: 106 MMOL/L (ref 96–112)
CHOLEST SERPL-MCNC: 158 MG/DL (ref 100–199)
CO2 SERPL-SCNC: 20 MMOL/L (ref 20–33)
CREAT SERPL-MCNC: 0.77 MG/DL (ref 0.5–1.4)
CRP SERPL HS-MCNC: <0.3 MG/DL (ref 0–0.75)
EKG IMPRESSION: NORMAL
FERRITIN SERPL-MCNC: 334 NG/ML (ref 10–291)
GFR SERPLBLD CREATININE-BSD FMLA CKD-EPI: 90 ML/MIN/1.73 M 2
GLOBULIN SER CALC-MCNC: 3.2 G/DL (ref 1.9–3.5)
GLUCOSE BLD STRIP.AUTO-MCNC: 111 MG/DL (ref 65–99)
GLUCOSE BLD STRIP.AUTO-MCNC: 113 MG/DL (ref 65–99)
GLUCOSE BLD STRIP.AUTO-MCNC: 146 MG/DL (ref 65–99)
GLUCOSE BLD STRIP.AUTO-MCNC: 146 MG/DL (ref 65–99)
GLUCOSE SERPL-MCNC: 131 MG/DL (ref 65–99)
HDLC SERPL-MCNC: 42 MG/DL
INR PPP: 1 (ref 0.87–1.13)
IRON SATN MFR SERPL: 86 % (ref 15–55)
IRON SERPL-MCNC: 240 UG/DL (ref 40–170)
LDLC SERPL CALC-MCNC: 88 MG/DL
LIPASE SERPL-CCNC: 55 U/L (ref 11–82)
NT-PROBNP SERPL IA-MCNC: 67 PG/ML (ref 0–125)
POTASSIUM SERPL-SCNC: 3.9 MMOL/L (ref 3.6–5.5)
PROCALCITONIN SERPL-MCNC: 0.11 NG/ML
PROT SERPL-MCNC: 6.9 G/DL (ref 6–8.2)
PROTHROMBIN TIME: 13.1 SEC (ref 12–14.6)
SODIUM SERPL-SCNC: 140 MMOL/L (ref 135–145)
TIBC SERPL-MCNC: 279 UG/DL (ref 250–450)
TRIGL SERPL-MCNC: 141 MG/DL (ref 0–149)
TROPONIN T SERPL-MCNC: <6 NG/L (ref 6–19)
UIBC SERPL-MCNC: 39 UG/DL (ref 110–370)

## 2023-06-21 PROCEDURE — 86038 ANTINUCLEAR ANTIBODIES: CPT

## 2023-06-21 PROCEDURE — 99233 SBSQ HOSP IP/OBS HIGH 50: CPT | Performed by: INTERNAL MEDICINE

## 2023-06-21 PROCEDURE — 86039 ANTINUCLEAR ANTIBODIES (ANA): CPT

## 2023-06-21 PROCEDURE — 96372 THER/PROPH/DIAG INJ SC/IM: CPT

## 2023-06-21 PROCEDURE — 700111 HCHG RX REV CODE 636 W/ 250 OVERRIDE (IP): Performed by: INTERNAL MEDICINE

## 2023-06-21 PROCEDURE — 700111 HCHG RX REV CODE 636 W/ 250 OVERRIDE (IP)

## 2023-06-21 PROCEDURE — 80061 LIPID PANEL: CPT

## 2023-06-21 PROCEDURE — 86645 CMV ANTIBODY IGM: CPT

## 2023-06-21 PROCEDURE — 83880 ASSAY OF NATRIURETIC PEPTIDE: CPT

## 2023-06-21 PROCEDURE — 82728 ASSAY OF FERRITIN: CPT

## 2023-06-21 PROCEDURE — 86644 CMV ANTIBODY: CPT

## 2023-06-21 PROCEDURE — 84484 ASSAY OF TROPONIN QUANT: CPT

## 2023-06-21 PROCEDURE — 93005 ELECTROCARDIOGRAM TRACING: CPT | Performed by: INTERNAL MEDICINE

## 2023-06-21 PROCEDURE — 80053 COMPREHEN METABOLIC PANEL: CPT

## 2023-06-21 PROCEDURE — 84145 PROCALCITONIN (PCT): CPT

## 2023-06-21 PROCEDURE — 96374 THER/PROPH/DIAG INJ IV PUSH: CPT

## 2023-06-21 PROCEDURE — 83550 IRON BINDING TEST: CPT

## 2023-06-21 PROCEDURE — 78452 HT MUSCLE IMAGE SPECT MULT: CPT

## 2023-06-21 PROCEDURE — A9270 NON-COVERED ITEM OR SERVICE: HCPCS | Performed by: INTERNAL MEDICINE

## 2023-06-21 PROCEDURE — 700102 HCHG RX REV CODE 250 W/ 637 OVERRIDE(OP): Performed by: INTERNAL MEDICINE

## 2023-06-21 PROCEDURE — G0378 HOSPITAL OBSERVATION PER HR: HCPCS

## 2023-06-21 PROCEDURE — 86663 EPSTEIN-BARR ANTIBODY: CPT

## 2023-06-21 PROCEDURE — 83540 ASSAY OF IRON: CPT

## 2023-06-21 PROCEDURE — 93010 ELECTROCARDIOGRAM REPORT: CPT | Performed by: INTERNAL MEDICINE

## 2023-06-21 PROCEDURE — 80143 DRUG ASSAY ACETAMINOPHEN: CPT

## 2023-06-21 PROCEDURE — 85610 PROTHROMBIN TIME: CPT

## 2023-06-21 PROCEDURE — 86664 EPSTEIN-BARR NUCLEAR ANTIGEN: CPT

## 2023-06-21 PROCEDURE — 86665 EPSTEIN-BARR CAPSID VCA: CPT | Mod: 91

## 2023-06-21 PROCEDURE — 96375 TX/PRO/DX INJ NEW DRUG ADDON: CPT

## 2023-06-21 PROCEDURE — 76705 ECHO EXAM OF ABDOMEN: CPT

## 2023-06-21 PROCEDURE — 82962 GLUCOSE BLOOD TEST: CPT | Mod: 91

## 2023-06-21 RX ORDER — ASPIRIN 81 MG/1
81 TABLET ORAL DAILY
Status: DISCONTINUED | OUTPATIENT
Start: 2023-06-21 | End: 2023-06-22 | Stop reason: HOSPADM

## 2023-06-21 RX ORDER — REGADENOSON 0.08 MG/ML
INJECTION, SOLUTION INTRAVENOUS
Status: COMPLETED
Start: 2023-06-21 | End: 2023-06-21

## 2023-06-21 RX ORDER — LISINOPRIL 10 MG/1
5 TABLET ORAL
Status: DISCONTINUED | OUTPATIENT
Start: 2023-06-21 | End: 2023-06-22 | Stop reason: HOSPADM

## 2023-06-21 RX ORDER — AMINOPHYLLINE 25 MG/ML
INJECTION, SOLUTION INTRAVENOUS
Status: DISPENSED
Start: 2023-06-21 | End: 2023-06-21

## 2023-06-21 RX ADMIN — AMLODIPINE BESYLATE 10 MG: 10 TABLET ORAL at 06:02

## 2023-06-21 RX ADMIN — ONDANSETRON 4 MG: 2 INJECTION INTRAMUSCULAR; INTRAVENOUS at 11:49

## 2023-06-21 RX ADMIN — REGADENOSON 0.4 MG: 0.08 INJECTION, SOLUTION INTRAVENOUS at 09:08

## 2023-06-21 RX ADMIN — SENNOSIDES AND DOCUSATE SODIUM 2 TABLET: 50; 8.6 TABLET ORAL at 17:29

## 2023-06-21 RX ADMIN — AMINOPHYLLINE 100 MG: 25 INJECTION, SOLUTION INTRAVENOUS at 09:12

## 2023-06-21 RX ADMIN — LISINOPRIL 5 MG: 10 TABLET ORAL at 17:29

## 2023-06-21 RX ADMIN — ASPIRIN 81 MG: 81 TABLET, COATED ORAL at 11:36

## 2023-06-21 RX ADMIN — ASPIRIN 325 MG: 325 TABLET ORAL at 06:02

## 2023-06-21 RX ADMIN — ENOXAPARIN SODIUM 40 MG: 100 INJECTION SUBCUTANEOUS at 17:28

## 2023-06-21 ASSESSMENT — ENCOUNTER SYMPTOMS
ABDOMINAL PAIN: 1
NAUSEA: 1

## 2023-06-21 ASSESSMENT — PAIN DESCRIPTION - PAIN TYPE
TYPE: ACUTE PAIN
TYPE: ACUTE PAIN

## 2023-06-21 NOTE — PROGRESS NOTES
Monitor summary: SR 73-86, CO 0.15, QRS 0.10, QT 0.41, with  PVCs per strip from monitor room.

## 2023-06-21 NOTE — PROGRESS NOTES
Patient transported to T209 via wheelchair by this RN.   Patient Aox4. Oriented patient to room/unit and educated on use of call light.  Plan of care discussed. Awaiting completion of RUQ-US as well as stress test in the AM. Patient verbalizes understanding.   No complaints of pain nor numbness/tingling at this time.  Patient KESSLER without difficulty and able to ambulate up to bathroom with SBA.   Tele monitor in place.  All needs addressed at this time.  Call light and personal belongings within reach, bed locked and in lowest position and hourly rounding in place.

## 2023-06-21 NOTE — ED NOTES
Checked on bed, connected to monitor,  with unlabored respirations. Vital signs is stable. Denied any new complaints. Gurney in low position, side rail up for pt safety. Call light within reach. Will continue to monitor for any complications.     Informed the pt regarding the admission bed for her and the admission process

## 2023-06-21 NOTE — ASSESSMENT & PLAN NOTE
Likely related to hepatitis  EKG on admission and troponin did not show any ischemic changes  Stress test was negative for any ischemia  Close monitoring

## 2023-06-21 NOTE — H&P
Hospital Medicine History & Physical Note    Date of Service  6/20/2023    Primary Care Physician  Hien Frey P.A.-C.      Code Status  Full code    Chief Complaint  Chief Complaint   Patient presents with    Chest Pain     Associated with shortness of breath, numbness of extremities that started at 1830  No numbness now, no weakness  Pain: 4/10  Alert and Oriented x 4       History of Presenting Illness  Rosario Imeldaapellanes Enerio is a 57 y.o. female with past medical history of type 2 diabetes, hypertension, dyslipidemia, who presented 6/20/2023 with transient episode of multiple symptoms.  She was working as a  standing up when she suddenly started feeling numbness in the whole body, the whole back pain, nausea, abdominal constant chest pain, sweating, dizziness and feeling like passing out.  Patient also reported some dry cough last week, but no fever.  She denies taking any new medications.  Denies drinking alcohol or using drugs.  Her blood pressure on the scene was elevated at 205/108.  In route she was given aspirin, fentanyl, nitroglycerin sublingual and Zofran.  She stated symptoms resolved.  Her chest pain evaluation showed normal troponin, EKG with RBBB, sinus tachycardia, heart rate 77.  Chest x-ray showed no acute abnormalities.  CMP showed elevated AST at 512, ALT at 261.  Bilirubin is not elevated  Follow-up LDH is elevated at 567, GGT of 755.  Lipase ordered and pending.  I discussed the plan of care with patient and ERP .    Review of Systems  Review of Systems   Constitutional:  Positive for malaise/fatigue. Negative for chills, fever and weight loss.   HENT:  Negative for ear pain, hearing loss and tinnitus.    Eyes:  Negative for blurred vision, double vision and photophobia.   Respiratory:  Positive for shortness of breath. Negative for cough, hemoptysis and sputum production.    Cardiovascular:  Positive for chest pain. Negative for palpitations and orthopnea.    Gastrointestinal:  Positive for nausea. Negative for heartburn and vomiting.   Genitourinary:  Negative for dysuria, flank pain, frequency and hematuria.   Musculoskeletal:  Positive for joint pain. Negative for back pain and neck pain.   Skin:  Negative for itching and rash.   Neurological:  Positive for sensory change. Negative for tremors, speech change, focal weakness and headaches.   Endo/Heme/Allergies:  Negative for environmental allergies and polydipsia. Does not bruise/bleed easily.   Psychiatric/Behavioral:  Negative for hallucinations and substance abuse. The patient is not nervous/anxious.        Past Medical History   has a past medical history of Diabetes (HCC), Heart murmur, Hypertension, and Palpitations.    Surgical History   has no past surgical history on file.     Family History  family history includes Heart Disease in her brother, father, mother, and sister; Ovarian Cancer in her sister; Stroke in her sister.   Family history reviewed with patient. There is no family history that is pertinent to the chief complaint.     Social History   reports that she has never smoked. She has never used smokeless tobacco. She reports that she does not drink alcohol and does not use drugs.    Allergies  No Known Allergies    Medications  Prior to Admission Medications   Prescriptions Last Dose Informant Patient Reported? Taking?   amLODIPine (NORVASC) 5 MG Tab   No No   Sig: Take 1 Tablet by mouth every day.   atorvastatin (LIPITOR) 40 MG Tab   No No   Sig: TAKE 1 TABLET BY MOUTH EVERY NIGHT AT BEDTIME   atorvastatin (LIPITOR) 40 MG Tab   No No   Sig: Take 1 Tablet by mouth every day. Take 1 tablet by mouth every night at bedtime   azithromycin (ZITHROMAX) 250 MG Tab   No No   Sig: As packaged   metFORMIN ER (GLUCOPHAGE XR) 500 MG TABLET SR 24 HR   No No   Sig: Take 1 Tablet by mouth every day. 2 po q am 1 po q pm   telmisartan (MICARDIS) 40 MG Tab   No No   Sig: Take 1 Tablet by mouth every day.   vitamin  D2, Ergocalciferol, (DRISDOL) 1.25 MG (38768 UT) Cap capsule   No No   Sig: Take 1 Capsule by mouth every 7 days.      Facility-Administered Medications: None       Physical Exam  Temp:  [36.8 °C (98.2 °F)] 36.8 °C (98.2 °F)  Pulse:  [66-76] 66  Resp:  [18-20] 18  BP: (149-188)/(66-82) 149/66  SpO2:  [95 %-97 %] 97 %  Blood Pressure: (!) 149/66   Temperature: 36.8 °C (98.2 °F)   Pulse: 66   Respiration: 18   Pulse Oximetry: 97 %       Physical Exam  Vitals and nursing note reviewed.   Constitutional:       General: She is not in acute distress.     Appearance: Normal appearance.   HENT:      Head: Normocephalic and atraumatic.      Nose: Nose normal.      Mouth/Throat:      Mouth: Mucous membranes are moist.   Eyes:      Extraocular Movements: Extraocular movements intact.      Pupils: Pupils are equal, round, and reactive to light.   Cardiovascular:      Rate and Rhythm: Normal rate and regular rhythm.   Pulmonary:      Effort: Pulmonary effort is normal.      Breath sounds: Normal breath sounds.   Abdominal:      General: Abdomen is flat. There is distension.      Tenderness: There is no abdominal tenderness.   Musculoskeletal:         General: No swelling or deformity. Normal range of motion.      Cervical back: Normal range of motion and neck supple.   Skin:     General: Skin is warm and dry.   Neurological:      General: No focal deficit present.      Mental Status: She is alert and oriented to person, place, and time.   Psychiatric:         Mood and Affect: Mood normal.         Behavior: Behavior normal.         Laboratory:  Recent Labs     06/20/23 1955   WBC 7.5   RBC 5.08   HEMOGLOBIN 14.9   HEMATOCRIT 44.6   MCV 87.8   MCH 29.3   MCHC 33.4   RDW 41.6   PLATELETCT 262   MPV 9.3     Recent Labs     06/20/23 1955   SODIUM 141   POTASSIUM 3.7   CHLORIDE 104   CO2 25   GLUCOSE 136*   BUN 12   CREATININE 0.79   CALCIUM 8.9     Recent Labs     06/20/23 1955   ALTSGPT 261*   ASTSGOT 512*   ALKPHOSPHAT 173*    TBILIRUBIN 1.1   GLUCOSE 136*         No results for input(s): NTPROBNP in the last 72 hours.      Recent Labs     06/20/23 1955   TROPONINT 6       Imaging:  DX-CHEST-PORTABLE (1 VIEW)   Final Result      No acute cardiopulmonary abnormality identified.          X-Ray:  I have personally reviewed the images and compared with prior images.    Assessment/Plan:  Justification for Admission Status  I anticipate this patient is appropriate for observation status at this time because hypertensive urgency, chest pain, LFT elevation    Patient will need a Telemetry bed on MEDICAL service .  The need is secondary to hypertensive urgency, chest pain, LFT elevation.    * Chest pain- (present on admission)  Assessment & Plan  Probably secondary to hypertensive crisis  Initial evaluation with EKG and troponin is negative.  Chest x-ray negative for acute findings.  Plan: Admit to telemetry, repeat troponin and EKG  Obtain pharmacological stress test in a.m.  Nitroglycerin and morphine as needed for chest pain      Hepatitis  Assessment & Plan  , .  Etiology is unclear.  Viral hepatitis screen is negative.  Denies drinking alcohol.  Bilirubin is not elevated.  Lipase is pending  Right upper quadrant ultrasound pending  We will hold Lipitor  Consider GI consult if no improvement on repeat CMP tomorrow    Hypertensive urgency  Assessment & Plan  Patient is on amlodipine 5 mg, reported compliance  Initial blood pressure 205/100  Will increase dose of amlodipine to 10 mg  Monitor blood pressure, labetalol IV as needed      Type 2 diabetes mellitus without complication, without long-term current use of insulin (HCC)- (present on admission)  Assessment & Plan  Holding metformin, ordered sliding scale insulin.    Dyslipidemia- (present on admission)  Assessment & Plan  Will hold Lipitor out of concern for LFT elevation        VTE prophylaxis: enoxaparin ppx

## 2023-06-21 NOTE — ASSESSMENT & PLAN NOTE
, .  Etiology is unclear.    Viral hepatitis screen is negative.  Denies drinking alcohol.  Bilirubin is not elevated.  Lipase is negative  Right upper quadrant ultrasound showed possible acalculous cholecystitis??  We will check EBV, CMV, AMNA and for routine  Continue monitoring and consider GI and MRCP.

## 2023-06-21 NOTE — ED TRIAGE NOTES
"Chief Complaint   Patient presents with    Chest Pain     Associated with shortness of breath, numbness of extremities that started at 1830  No numbness now, no weakness  Pain: 4/10  Alert and Oriented x 4     Also complaining of cough for 1 week, no fever  Noted elevated blood pressure: 205/108 mm HG  Brought by EMS from her workplace with the above complaints    Medication given en route:   -- Aspirin 324 mg tab PO  -- Fentanyl 50 mcg IV  -- Nitroglycerin 0.4 mg tab SL  -- Zofran 4 mg IV    PIV gauge 20 left wrist  EKG: Sinus Rhythm      BP (!) 188/82   Pulse 76   Temp 36.8 °C (98.2 °F) (Temporal)   Resp 20   Ht 1.651 m (5' 5\")   Wt 62.1 kg (137 lb)   SpO2 95%    "

## 2023-06-21 NOTE — CARE PLAN
The patient is Stable - Low risk of patient condition declining or worsening    Shift Goals  Clinical Goals: RUQ-US; Tele monitoring;  Patient Goals: Eat;  Family Goals: COOPER;    Problem: Knowledge Deficit - Standard  Goal: Patient and family/care givers will demonstrate understanding of plan of care, disease process/condition, diagnostic tests and medications  Description: Target End Date: 6/21/23    1.  Patient oriented to unit, equipment, visitation policy and means for communicating concern  2.  Complete/review Learning Assessment  3.  Assess knowledge level of disease process/condition, treatment plan, diagnostic tests and medications  4.  Explain disease process/condition, treatment plan, diagnostic tests and medications  Outcome: Progressing     Problem: Pain - Standard  Goal: Alleviation of pain or a reduction in pain to the patient’s comfort goal  Description: Target End Date:  6/21/23    1.  Document pain using the appropriate pain scale per order or unit policy  2.  Educate and implement non-pharmacologic comfort measures (i.e. relaxation, distraction, cold/heat therapy, etc.)  3.  Pain management medications as ordered  4.  Reassess pain after pain med administration per policy  Outcome: Progressing

## 2023-06-21 NOTE — CARE PLAN
Problem: Pain - Standard  Goal: Alleviation of pain or a reduction in pain to the patient’s comfort goal  Outcome: Progressing   The patient is Stable - Low risk of patient condition declining or worsening    Shift Goals  Clinical Goals: Stress test by 1200  Patient Goals: Eat;  Family Goals: COOPER;    Progress made toward(s) clinical / shift goals:  yes, ST was done awaiting for results at this time    Patient is not progressing towards the following goals:

## 2023-06-21 NOTE — ED NOTES
Report given to the assigned RN in T209 for continuity of care and management.  Provided opportunity to asks questions.  Pt connected to monitor  All pt belongings at bedside

## 2023-06-21 NOTE — PROGRESS NOTES
4 Eyes Skin Assessment Completed by KARAN Blevins and KARAN Higgins.    Head WDL  Ears WDL  Nose WDL  Mouth WDL  Neck WDL  Breast/Chest WDL  Shoulder Blades WDL  Spine WDL  (R) Arm/Elbow/Hand WDL  (L) Arm/Elbow/Hand WDL  Abdomen WDL  Groin WDL  Scrotum/Coccyx/Buttocks WDL  (R) Leg WDL  (L) Leg WDL  (R) Heel/Foot/Toe: Dry  (L) Heel/Foot/Toe: Dry          Devices In Places Tele Box and Pulse Ox      Interventions In Place Pillows    Possible Skin Injury No    Pictures Uploaded Into Epic N/A  Wound Consult Placed N/A  RN Wound Prevention Protocol Ordered No

## 2023-06-21 NOTE — ASSESSMENT & PLAN NOTE
Continue home medication amlodipine 5 mg  Blood pressure still elevated, add lisinopril  Close monitoring and adjust the medication if needed

## 2023-06-21 NOTE — PROGRESS NOTES
Report received from night shift RN. Assume care. Pt. AAOx4 still c/o dizziness with movement, pt is bed,  Assessment completed. VSS. good CMS and pulses to barb LE,  Pt was update for the care for the day. White board updated, All question answered. Pt has call light within reach,  bed is in the lowest position. Pt has no other needs at this time.      0750 Pt going to Nuc Med.

## 2023-06-21 NOTE — PROGRESS NOTES
[facility] Medicine Daily Progress Note    Date of Service  6/21/2023    Chief Complaint  Rosario Imeldaapellanes Enerio is a 57 y.o. female admitted 6/20/2023 with epigastric pain    Hospital Course    57-year-old female with history of diabetes, hypertension and dyslipidemia who presented 6/20 with epigastric pain.  Started on the day of admission after eating her dinner which she started having epigastric pain and nausea with vomiting.  Denied any shortness of breath, no fever or chills or other symptoms.  On admission EKG showed chronic right bundle branch with sinus tach he, chest x-ray did not show any acute finding and troponin was negative however liver enzymes were elevated.  Patient was admitted to the hospital for ischemia work-up and stress test was negative for any ischemia.      Patient has elevated liver enzymes since admission, getting worse during the hospitalization.  With some abdominal pain.  Normal bilirubin.  Denied any history of alcoholism.  Ultrasound showed possible acalculous cholecystitis, not able to get HIDA scan since patient already had stress test.  Work-up was ordered and continue monitoring.      Interval Problem Update  -Evaluated examined the patient at bedside, some improvement in her pain however she still having some nausea.  -Stress test is negative  -Add lisinopril 5 mg daily  -Advance diet as tolerated  -Worsening liver enzymes, order work-up including AMNA EBV and CMV.  Not able to get HIDA scan since patient had stress test.  -Continue monitoring liver enzymes and consider surgery consult tomorrow if there is any worsening.  -No signs of infection and no need for antibiotics at this time    I have discussed this patient's plan of care and discharge plan at IDT rounds today with Case Management, Nursing, Nursing leadership, and other members of the IDT team.    Consultants/Specialty  None  Code Status  Full Code    Disposition  The patient is not medically cleared for  discharge to home or a post-acute facility.  Anticipate discharge to: home with close outpatient follow-up    I have placed the appropriate orders for post-discharge needs.    Review of Systems  Review of Systems   Gastrointestinal:  Positive for abdominal pain and nausea.   All other systems reviewed and are negative.       Physical Exam  Temp:  [36.5 °C (97.7 °F)-36.8 °C (98.2 °F)] 36.5 °C (97.7 °F)  Pulse:  [66-84] 80  Resp:  [16-20] 18  BP: (138-188)/(66-82) 151/80  SpO2:  [95 %-97 %] 96 %    Physical Exam  Constitutional:       General: She is not in acute distress.     Appearance: She is not ill-appearing.   Eyes:      General: No scleral icterus.  Cardiovascular:      Rate and Rhythm: Normal rate.      Heart sounds: No murmur heard.  Pulmonary:      Effort: No respiratory distress.      Breath sounds: No wheezing.   Abdominal:      General: There is no distension.      Tenderness: There is abdominal tenderness. There is no guarding.   Musculoskeletal:      Right lower leg: No edema.      Left lower leg: No edema.   Skin:     Coloration: Skin is not jaundiced.      Findings: No bruising or lesion.   Neurological:      General: No focal deficit present.      Mental Status: She is oriented to person, place, and time. Mental status is at baseline.      Cranial Nerves: No cranial nerve deficit.      Motor: No weakness.         Fluids  No intake or output data in the 24 hours ending 06/21/23 1608    Laboratory  Recent Labs     06/20/23 1955   WBC 7.5   RBC 5.08   HEMOGLOBIN 14.9   HEMATOCRIT 44.6   MCV 87.8   MCH 29.3   MCHC 33.4   RDW 41.6   PLATELETCT 262   MPV 9.3     Recent Labs     06/20/23 1955 06/21/23  0041   SODIUM 141 140   POTASSIUM 3.7 3.9   CHLORIDE 104 106   CO2 25 20   GLUCOSE 136* 131*   BUN 12 11   CREATININE 0.79 0.77   CALCIUM 8.9 8.5     Recent Labs     06/21/23  0041   INR 1.00         Recent Labs     06/21/23  0041   TRIGLYCERIDE 141   HDL 42   LDL 88       Imaging  NM-CARDIAC STRESS TEST    Final Result      US-RUQ   Final Result         1.  Echogenic liver compatible with fatty change versus fibrosis.   2.  Borderline gallbladder wall thickening without visualized shadowing stones. Consider acalculous cholecystitis as clinically appropriate.      DX-CHEST-PORTABLE (1 VIEW)   Final Result      No acute cardiopulmonary abnormality identified.      NM-BILIARY (HIDA) SCAN WITH CCK    (Results Pending)        Assessment/Plan  * Hepatitis  Assessment & Plan  , .  Etiology is unclear.    Viral hepatitis screen is negative.  Denies drinking alcohol.  Bilirubin is not elevated.  Lipase is negative  Right upper quadrant ultrasound showed possible acalculous cholecystitis??  We will check EBV, CMV, AMNA and for routine  Continue monitoring and consider GI and MRCP.    Reactive depression- (present on admission)  Assessment & Plan  Stable continue, home medication,    Hypertensive urgency  Assessment & Plan  Continue home medication amlodipine 5 mg  Blood pressure still elevated, add lisinopril  Close monitoring and adjust the medication if needed      Epigastric pain- (present on admission)  Assessment & Plan  Likely related to hepatitis  EKG on admission and troponin did not show any ischemic changes  Stress test was negative for any ischemia  Close monitoring      Type 2 diabetes mellitus without complication, without long-term current use of insulin (HCC)- (present on admission)  Assessment & Plan  A1c 6.2   holding metformin,   Sliding scale    Dyslipidemia- (present on admission)  Assessment & Plan  Continue holding atorvastatin due to elevated liver enzymes         VTE prophylaxis: SCDs/TEDs and enoxaparin ppx    I have performed a physical exam and reviewed and updated ROS and Plan today (6/21/2023). In review of yesterday's note (6/20/2023), there are no changes except as documented above.

## 2023-06-21 NOTE — ED PROVIDER NOTES
ER Provider Note    Scribed for Art Duff D.O. by Lili Rosario. 6/20/2023  7:33 PM    Primary Care Provider: Hien Frey P.A.-C.    CHIEF COMPLAINT  Chief Complaint   Patient presents with    Chest Pain     Associated with shortness of breath, numbness of extremities that started at 1830  No numbness now, no weakness  Pain: 4/10  Alert and Oriented x 4     HPI/ROS    Rosario Imeldaapellanes Enerio is a 57 y.o. female who presents to the Emergency Department via EMS for chest pain onset earlier today. The patient was standing at work earlier today when her whole body became numb. She also had back pain, chest pain, and shortness of breath. She is asymptomatic at this time. She also had nausea, but no vomiting. She has a history of diabetes hyperlipidemia, hypertension. She takes metformin at home. She has no history of heart attacks in the past.    ROS as per HPI.    PAST MEDICAL HISTORY  Past Medical History:   Diagnosis Date    Diabetes (HCC)     Heart murmur     Hypertension     Palpitations      SURGICAL HISTORY  No past surgical history noted.    FAMILY HISTORY  Family History   Problem Relation Age of Onset    Heart Disease Mother     Heart Disease Father     Heart Disease Sister         pacemaker    Stroke Sister     Ovarian Cancer Sister     Heart Disease Brother         transplant       SOCIAL HISTORY   reports that she has never smoked. She has never used smokeless tobacco. She reports that she does not drink alcohol and does not use drugs.    CURRENT MEDICATIONS  Previous Medications    AMLODIPINE (NORVASC) 5 MG TAB    Take 1 Tablet by mouth every day.    ATORVASTATIN (LIPITOR) 40 MG TAB    TAKE 1 TABLET BY MOUTH EVERY NIGHT AT BEDTIME    ATORVASTATIN (LIPITOR) 40 MG TAB    Take 1 Tablet by mouth every day. Take 1 tablet by mouth every night at bedtime    AZITHROMYCIN (ZITHROMAX) 250 MG TAB    As packaged    METFORMIN ER (GLUCOPHAGE XR) 500 MG TABLET SR 24 HR    Take 1 Tablet by mouth every  "day. 2 po q am 1 po q pm    TELMISARTAN (MICARDIS) 40 MG TAB    Take 1 Tablet by mouth every day.    VITAMIN D2, ERGOCALCIFEROL, (DRISDOL) 1.25 MG (56990 UT) CAP CAPSULE    Take 1 Capsule by mouth every 7 days.     ALLERGIES  Patient has no known allergies.    PHYSICAL EXAM  BP (!) 188/82   Pulse 76   Temp 36.8 °C (98.2 °F) (Temporal)   Resp 20   Ht 1.651 m (5' 5\")   Wt 62.1 kg (137 lb)   SpO2 95%   BMI 22.80 kg/m²     General: No acute distress.  HENT: Normocephalic, Mucus membranes are moist.   Chest: Lungs have even and unlabored respirations, Clear to auscultation.   Cardiovascular: Regular rate and regular rhythm, No peripheral cyanosis.  Abdomen: Non distended.  Neuro: Awake, Conversive, Able to relay recent events.  Psychiatric: Calm and cooperative.     EXTERNAL RECORDS REVIEWED  Review of patient's past medical records show last stress test was 7/20/2021.     INITIAL ASSESSMENT  Patient had an episode of chest pain with associated symptoms. She has significant risk factors for heart disease. EKG shows right bundle branch block, no signs of STEMI. She is asymptomatic now. We will evaluate for cardiac injury.     ED Observation Status? No; Patient does not meet criteria for ED Observation.     DIAGNOSTIC STUDIES    Labs:   Results for orders placed or performed during the hospital encounter of 06/20/23   CBC with Differential   Result Value Ref Range    WBC 7.5 4.8 - 10.8 K/uL    RBC 5.08 4.20 - 5.40 M/uL    Hemoglobin 14.9 12.0 - 16.0 g/dL    Hematocrit 44.6 37.0 - 47.0 %    MCV 87.8 81.4 - 97.8 fL    MCH 29.3 27.0 - 33.0 pg    MCHC 33.4 32.2 - 35.5 g/dL    RDW 41.6 35.9 - 50.0 fL    Platelet Count 262 164 - 446 K/uL    MPV 9.3 9.0 - 12.9 fL    Neutrophils-Polys 70.40 44.00 - 72.00 %    Lymphocytes 17.50 (L) 22.00 - 41.00 %    Monocytes 5.70 0.00 - 13.40 %    Eosinophils 5.50 0.00 - 6.90 %    Basophils 0.50 0.00 - 1.80 %    Immature Granulocytes 0.40 0.00 - 0.90 %    Nucleated RBC 0.00 0.00 - 0.20 " /100 WBC    Neutrophils (Absolute) 5.28 1.82 - 7.42 K/uL    Lymphs (Absolute) 1.31 1.00 - 4.80 K/uL    Monos (Absolute) 0.43 0.00 - 0.85 K/uL    Eos (Absolute) 0.41 0.00 - 0.51 K/uL    Baso (Absolute) 0.04 0.00 - 0.12 K/uL    Immature Granulocytes (abs) 0.03 0.00 - 0.11 K/uL    NRBC (Absolute) 0.00 K/uL   Complete Metabolic Panel (CMP)   Result Value Ref Range    Sodium 141 135 - 145 mmol/L    Potassium 3.7 3.6 - 5.5 mmol/L    Chloride 104 96 - 112 mmol/L    Co2 25 20 - 33 mmol/L    Anion Gap 12.0 7.0 - 16.0    Glucose 136 (H) 65 - 99 mg/dL    Bun 12 8 - 22 mg/dL    Creatinine 0.79 0.50 - 1.40 mg/dL    Calcium 8.9 8.5 - 10.5 mg/dL    AST(SGOT) 512 (H) 12 - 45 U/L    ALT(SGPT) 261 (H) 2 - 50 U/L    Alkaline Phosphatase 173 (H) 30 - 99 U/L    Total Bilirubin 1.1 0.1 - 1.5 mg/dL    Albumin 4.3 3.2 - 4.9 g/dL    Total Protein 7.8 6.0 - 8.2 g/dL    Globulin 3.5 1.9 - 3.5 g/dL    A-G Ratio 1.2 g/dL   Troponins NOW   Result Value Ref Range    Troponin T 6 6 - 19 ng/L   CORRECTED CALCIUM   Result Value Ref Range    Correct Calcium 8.7 8.5 - 10.5 mg/dL   ESTIMATED GFR   Result Value Ref Range    GFR (CKD-EPI) 87 >60 mL/min/1.73 m 2   EKG   Result Value Ref Range    Report       [facility] Emergency Dept.    Test Date:  2023  Pt Name:    JOSE LUIS SAAVEDRA               Department: ER  MRN:        [id number]                      Room:       RD 11  Gender:     Female                       Technician: [id number]  :        1965                   Requested By:ER TRIAGE PROTOCOL  Order #:    [id number]                    Reading MD:    Measurements  Intervals                                Axis  Rate:       76                           P:          58  SC:         146                          QRS:        33  QRSD:       135                          T:          39  QT:         412  QTc:        464    Interpretive Statements  Sinus rhythm  Right bundle branch block  Compared to ECG 2023 23:58:08  No  significant changes          All labs reviewed by me.      EKG:   I have independently interpreted the above EKG.    Radiology:   The attending emergency physician has independently interpreted the diagnostic imaging associated with this visit and am waiting the final reading from the radiologist.   Preliminary interpretation is as follows: Chest x-ray no acute disease  Radiologist interpretation:   DX-CHEST-PORTABLE (1 VIEW)   Final Result      No acute cardiopulmonary abnormality identified.         COURSE & MEDICAL DECISION MAKING     COURSE AND PLAN  7:33 PM - Patient seen and examined at bedside. Discussed plan of care, including labs, imaging, and admission. Patient agrees to the plan of care. Ordered for EKG, DX-Chest, Troponin, CMP, and CBC w/ Diff to evaluate her symptoms.     ED Summary: Patient presented with a numbness feeling all over then she developed chest pain, shortness of breath with nausea.  She has significant risk factors for heart disease.  There is no signs of stroke she had no weakness.  Her symptoms have resolved by the time I evaluated her.    Her heart score is 5.  I spoke with hospitalist for admission the patient will be admitted for further evaluation and treatment    Decision tools and prescription drugs considered including, but not limited to: Heart score 5    DISPOSITION AND DISCUSSIONS  I have discussed management of the patient with the following physicians and JOE's: Dr. Ariza (Hospitalist)    DDISPOSITION:  Patient will be hospitalized by Dr. ariza in guarded condition.     FINAL DIAGNOSIS  1. Precordial chest pain        Lili CASTILLO (Axel), am scribing for, and in the presence of, Art Duff D.O..    Electronically signed by: Lili Rosario (Axel), 6/20/2023    Art CASTILLO D.O. personally performed the services described in this documentation, as scribed by Lili Rosario in my presence, and it is both accurate and complete.     The note  accurately reflects work and decisions made by me.  Art Duff D.O.  6/20/2023  9:27 PM

## 2023-06-22 ENCOUNTER — PHARMACY VISIT (OUTPATIENT)
Dept: PHARMACY | Facility: MEDICAL CENTER | Age: 58
End: 2023-06-22
Payer: COMMERCIAL

## 2023-06-22 VITALS
HEIGHT: 65 IN | BODY MASS INDEX: 24.5 KG/M2 | HEART RATE: 80 BPM | OXYGEN SATURATION: 93 % | WEIGHT: 147.05 LBS | SYSTOLIC BLOOD PRESSURE: 121 MMHG | DIASTOLIC BLOOD PRESSURE: 78 MMHG | RESPIRATION RATE: 16 BRPM | TEMPERATURE: 97.7 F

## 2023-06-22 PROBLEM — I16.0 HYPERTENSIVE URGENCY: Status: RESOLVED | Noted: 2023-06-20 | Resolved: 2023-06-22

## 2023-06-22 LAB
ALBUMIN SERPL BCP-MCNC: 3.7 G/DL (ref 3.2–4.9)
ALBUMIN/GLOB SERPL: 1.1 G/DL
ALP SERPL-CCNC: 188 U/L (ref 30–99)
ALT SERPL-CCNC: 484 U/L (ref 2–50)
ANION GAP SERPL CALC-SCNC: 12 MMOL/L (ref 7–16)
AST SERPL-CCNC: 330 U/L (ref 12–45)
BASOPHILS # BLD AUTO: 0.6 % (ref 0–1.8)
BASOPHILS # BLD: 0.03 K/UL (ref 0–0.12)
BILIRUB SERPL-MCNC: 0.4 MG/DL (ref 0.1–1.5)
BUN SERPL-MCNC: 11 MG/DL (ref 8–22)
CALCIUM ALBUM COR SERPL-MCNC: 8.9 MG/DL (ref 8.5–10.5)
CALCIUM SERPL-MCNC: 8.7 MG/DL (ref 8.5–10.5)
CHLORIDE SERPL-SCNC: 107 MMOL/L (ref 96–112)
CO2 SERPL-SCNC: 21 MMOL/L (ref 20–33)
CORTIS SERPL-MCNC: 4.9 UG/DL (ref 0–23)
CREAT SERPL-MCNC: 0.84 MG/DL (ref 0.5–1.4)
CRP SERPL HS-MCNC: <0.3 MG/DL (ref 0–0.75)
EOSINOPHIL # BLD AUTO: 0.76 K/UL (ref 0–0.51)
EOSINOPHIL NFR BLD: 14.4 % (ref 0–6.9)
ERYTHROCYTE [DISTWIDTH] IN BLOOD BY AUTOMATED COUNT: 43.9 FL (ref 35.9–50)
GFR SERPLBLD CREATININE-BSD FMLA CKD-EPI: 81 ML/MIN/1.73 M 2
GLOBULIN SER CALC-MCNC: 3.5 G/DL (ref 1.9–3.5)
GLUCOSE BLD STRIP.AUTO-MCNC: 106 MG/DL (ref 65–99)
GLUCOSE SERPL-MCNC: 100 MG/DL (ref 65–99)
HCT VFR BLD AUTO: 45.1 % (ref 37–47)
HGB BLD-MCNC: 14.5 G/DL (ref 12–16)
IMM GRANULOCYTES # BLD AUTO: 0.01 K/UL (ref 0–0.11)
IMM GRANULOCYTES NFR BLD AUTO: 0.2 % (ref 0–0.9)
LDH SERPL L TO P-CCNC: 362 U/L (ref 107–266)
LYMPHOCYTES # BLD AUTO: 1.84 K/UL (ref 1–4.8)
LYMPHOCYTES NFR BLD: 34.9 % (ref 22–41)
MAGNESIUM SERPL-MCNC: 2.2 MG/DL (ref 1.5–2.5)
MCH RBC QN AUTO: 29.3 PG (ref 27–33)
MCHC RBC AUTO-ENTMCNC: 32.2 G/DL (ref 32.2–35.5)
MCV RBC AUTO: 91.1 FL (ref 81.4–97.8)
MONOCYTES # BLD AUTO: 0.29 K/UL (ref 0–0.85)
MONOCYTES NFR BLD AUTO: 5.5 % (ref 0–13.4)
NEUTROPHILS # BLD AUTO: 2.34 K/UL (ref 1.82–7.42)
NEUTROPHILS NFR BLD: 44.4 % (ref 44–72)
NRBC # BLD AUTO: 0 K/UL
NRBC BLD-RTO: 0 /100 WBC (ref 0–0.2)
PLATELET # BLD AUTO: 240 K/UL (ref 164–446)
PMV BLD AUTO: 9.4 FL (ref 9–12.9)
POTASSIUM SERPL-SCNC: 3.7 MMOL/L (ref 3.6–5.5)
PROT SERPL-MCNC: 7.2 G/DL (ref 6–8.2)
RBC # BLD AUTO: 4.95 M/UL (ref 4.2–5.4)
SODIUM SERPL-SCNC: 140 MMOL/L (ref 135–145)
TSH SERPL DL<=0.005 MIU/L-ACNC: 1.93 UIU/ML (ref 0.38–5.33)
WBC # BLD AUTO: 5.3 K/UL (ref 4.8–10.8)

## 2023-06-22 PROCEDURE — 83735 ASSAY OF MAGNESIUM: CPT

## 2023-06-22 PROCEDURE — 99239 HOSP IP/OBS DSCHRG MGMT >30: CPT | Performed by: INTERNAL MEDICINE

## 2023-06-22 PROCEDURE — 80053 COMPREHEN METABOLIC PANEL: CPT

## 2023-06-22 PROCEDURE — 82533 TOTAL CORTISOL: CPT

## 2023-06-22 PROCEDURE — 86140 C-REACTIVE PROTEIN: CPT

## 2023-06-22 PROCEDURE — 83615 LACTATE (LD) (LDH) ENZYME: CPT

## 2023-06-22 PROCEDURE — 82962 GLUCOSE BLOOD TEST: CPT

## 2023-06-22 PROCEDURE — A9270 NON-COVERED ITEM OR SERVICE: HCPCS | Performed by: INTERNAL MEDICINE

## 2023-06-22 PROCEDURE — 85025 COMPLETE CBC W/AUTO DIFF WBC: CPT

## 2023-06-22 PROCEDURE — 700102 HCHG RX REV CODE 250 W/ 637 OVERRIDE(OP): Performed by: INTERNAL MEDICINE

## 2023-06-22 PROCEDURE — 84443 ASSAY THYROID STIM HORMONE: CPT

## 2023-06-22 PROCEDURE — G0378 HOSPITAL OBSERVATION PER HR: HCPCS

## 2023-06-22 PROCEDURE — RXMED WILLOW AMBULATORY MEDICATION CHARGE: Performed by: INTERNAL MEDICINE

## 2023-06-22 RX ORDER — LISINOPRIL 5 MG/1
5 TABLET ORAL DAILY
Qty: 30 TABLET | Refills: 1 | Status: SHIPPED | OUTPATIENT
Start: 2023-06-23 | End: 2023-07-25 | Stop reason: SDUPTHER

## 2023-06-22 RX ADMIN — SENNOSIDES AND DOCUSATE SODIUM 2 TABLET: 50; 8.6 TABLET ORAL at 05:57

## 2023-06-22 RX ADMIN — ASPIRIN 81 MG: 81 TABLET, COATED ORAL at 05:57

## 2023-06-22 RX ADMIN — LISINOPRIL 5 MG: 10 TABLET ORAL at 05:57

## 2023-06-22 RX ADMIN — AMLODIPINE BESYLATE 10 MG: 10 TABLET ORAL at 05:57

## 2023-06-22 NOTE — DISCHARGE SUMMARY
Discharge Summary    CHIEF COMPLAINT ON ADMISSION  Chief Complaint   Patient presents with    Chest Pain     Associated with shortness of breath, numbness of extremities that started at 1830  No numbness now, no weakness  Pain: 4/10  Alert and Oriented x 4       Reason for Admission  Elevated liver enzymes likely related to statin  Chest pain, atypical, negative work-up.    Admission Date  6/20/2023    CODE STATUS  Full Code    HPI & HOSPITAL COURSE    57-year-old female with history of diabetes, hypertension and dyslipidemia who presented 6/20 with epigastric pain.  Started on the day of admission after eating her dinner which she started having epigastric pain and nausea with vomiting.  Denied any shortness of breath, no fever or chills or other symptoms.  On admission EKG showed chronic right bundle branch with sinus tach he, chest x-ray did not show any acute finding and troponin was negative however liver enzymes were elevated.  Patient was admitted to the hospital for ischemia work-up and stress test was negative for any ischemia.       Patient has elevated liver enzymes since admission, getting worse during the hospitalization.  With some abdominal pain.  Normal bilirubin.  Denied any history of alcoholism.  Ultrasound showed possible acalculous cholecystitis, not able to get HIDA scan since patient already had stress test.  However recently patient started with atorvastatin for dyslipidemia.  Atorvastatin was held on admission with improving on her liver enzymes.  Patient feels okay with no pain or fever and no signs of infection.  Patient will be discharged and holding atorvastatin with close monitoring with PCP.  Consider on the rechallenging for a statin like rosuvastatin or pravastatin with a small dose.    On the day of discharge the patient was alert and oriented x4, denied any pain or chills, labs around baseline, improving on her liver enzymes.  Patient is stable for discharge.  Appointment with PCP  was scheduled before discharge.    Therefore, she is discharged in good and stable condition    The patient recovered much more quickly than anticipated on admission.    Discharge Date  06/22/23      FOLLOW UP ITEMS FOR DYSLIPIDEMIA POST DISCHARGE  Follow-up with PCP for dyslipidemia and repeat liver enzymes    DISCHARGE DIAGNOSES  Principal Problem:    Hepatitis (POA: Unknown)  Active Problems:    Reactive depression (POA: Yes)    Dyslipidemia (POA: Yes)    Type 2 diabetes mellitus without complication, without long-term current use of insulin (HCC) (POA: Yes)    Epigastric pain (POA: Yes)  Resolved Problems:    Hypertensive urgency (POA: Unknown)      FOLLOW UP  Hien Frey P.A.-C.  [address] [id number] [phone]    Follow up in 1 week(s)        MEDICATIONS ON DISCHARGE     Medication List        START taking these medications        Instructions   lisinopril 5 MG Tabs  Start taking on: June 23, 2023  Commonly known as: PRINIVIL   Take 1 Tablet by mouth every day.  Dose: 5 mg            CHANGE how you take these medications        Instructions   metFORMIN  MG Tb24  What changed:   how much to take  additional instructions  Commonly known as: GLUCOPHAGE XR   Take 1 Tablet by mouth every day. 2 po q am 1 po q pm  Dose: 500 mg            CONTINUE taking these medications        Instructions   amLODIPine 5 MG Tabs  Commonly known as: NORVASC   Take 1 Tablet by mouth every day.  Dose: 5 mg            STOP taking these medications      atorvastatin 40 MG Tabs  Commonly known as: LIPITOR              Allergies  No Known Allergies    DIET  Orders Placed This Encounter   Procedures    Diet Order Diet: Consistent CHO (Diabetic)     Standing Status:   Standing     Number of Occurrences:   1     Order Specific Question:   Diet:     Answer:   Consistent CHO (Diabetic) [4]       ACTIVITY  As tolerated.  Weight bearing as tolerated    CONSULTATIONS  None    PROCEDURES  None    LABORATORY  Lab  Results   Component Value Date    SODIUM 140 06/22/2023    POTASSIUM 3.7 06/22/2023    CHLORIDE 107 06/22/2023    CO2 21 06/22/2023    GLUCOSE 100 (H) 06/22/2023    BUN 11 06/22/2023    CREATININE 0.84 06/22/2023        Lab Results   Component Value Date    WBC 5.3 06/22/2023    HEMOGLOBIN 14.5 06/22/2023    HEMATOCRIT 45.1 06/22/2023    PLATELETCT 240 06/22/2023        Total time of the discharge process exceeds 34 minutes.

## 2023-06-22 NOTE — PROGRESS NOTES
Report received from night shift RN. Assume care. Pt. AAOx4 pt is bed,  Assessment completed. VSS. Denies pain, MD canceled HIDA scan, to follow up PCP;  contacted for PCP follow up appointment;  All question answered. Pt has call light within reach,  bed is in the lowest position. Pt has no other needs at this time. If pt tolerates diet will discharge.

## 2023-06-22 NOTE — CARE PLAN
The patient is Stable - Low risk of patient condition declining or worsening    Shift Goals  Clinical Goals: Monitor labs;  Patient Goals: Sleep;  Family Goals: COOPER;    Problem: Knowledge Deficit - Standard  Goal: Patient and family/care givers will demonstrate understanding of plan of care, disease process/condition, diagnostic tests and medications  Description: Target End Date:  6/23/23    1.  Assess knowledge level of disease process/condition, treatment plan, diagnostic tests and medications  2.  Explain disease process/condition, treatment plan, diagnostic tests and medications  3. Plan of care discussed. Anticipate completion of HIDA scan   Outcome: Progressing     Problem: Pain - Standard  Goal: Alleviation of pain or a reduction in pain to the patient’s comfort goal  Description: Target End Date:  6/23/23    1.  Document pain using the appropriate pain scale per order or unit policy  2.  Educate and implement non-pharmacologic comfort measures (i.e. relaxation, distraction, massage, cold/heat therapy, etc.)  3.  Pain management medications as ordered  4.  Reassess pain after pain med administration per policy  5.  If opiods administered assess patient's response to pain medication is appropriate per POSS sedation scale  Outcome: Progressing

## 2023-06-22 NOTE — PROGRESS NOTES
Patient seen Aox4 and resting comfortably in bed.  Plan of care discussed. Will continue to monitor labs and anticipate completion of HIDA scan during admission. Patient verbalizes understanding.  Patient has no complaints of pain or numbness/tingling at this time.  KESSLER without difficulty and able to ambulate up to bathroom independently.   Telemetry monitor in place.  All questions answered at this time.  Call light and personal belongings within reach, bed locked and in lowest position and hourly rounding in place.

## 2023-06-23 LAB
CMV IGG SERPL IA-ACNC: >10 U/ML
CMV IGM SERPL IA-ACNC: <8 AU/ML
EBV EA-D IGG SER-ACNC: 21 U/ML (ref 0–10.9)
EBV NA IGG SER IA-ACNC: >600 U/ML (ref 0–21.9)
EBV VCA IGG SER IA-ACNC: >750 U/ML (ref 0–21.9)
EBV VCA IGM SER IA-ACNC: <10 U/ML (ref 0–43.9)
NUCLEAR IGG SER QL IA: DETECTED

## 2023-06-25 LAB
ANA INTERPRETIVE COMMENT Q5143: NORMAL
ANTINUCLEAR ANTIBODY (ANA), HEP-2, IGG Q5142: NORMAL

## 2023-06-29 ENCOUNTER — TELEPHONE (OUTPATIENT)
Dept: MEDICAL GROUP | Facility: IMAGING CENTER | Age: 58
End: 2023-06-29

## 2023-06-29 NOTE — TELEPHONE ENCOUNTER
LVM with patient's sister to reschedule the patient's hospital follow up per provider request. Patient was instructed to call [phone].

## 2023-07-25 ENCOUNTER — HOSPITAL ENCOUNTER (OUTPATIENT)
Dept: LAB | Facility: MEDICAL CENTER | Age: 58
End: 2023-07-25
Attending: PHYSICIAN ASSISTANT
Payer: COMMERCIAL

## 2023-07-25 ENCOUNTER — OFFICE VISIT (OUTPATIENT)
Dept: MEDICAL GROUP | Facility: IMAGING CENTER | Age: 58
End: 2023-07-25
Payer: COMMERCIAL

## 2023-07-25 VITALS
TEMPERATURE: 97.7 F | SYSTOLIC BLOOD PRESSURE: 142 MMHG | HEIGHT: 65 IN | RESPIRATION RATE: 16 BRPM | BODY MASS INDEX: 24.16 KG/M2 | HEART RATE: 72 BPM | OXYGEN SATURATION: 99 % | WEIGHT: 145 LBS | DIASTOLIC BLOOD PRESSURE: 90 MMHG

## 2023-07-25 DIAGNOSIS — R74.02: ICD-10-CM

## 2023-07-25 DIAGNOSIS — R10.13 DYSPEPSIA: ICD-10-CM

## 2023-07-25 DIAGNOSIS — B17.9 HEPATITIS, ACUTE: ICD-10-CM

## 2023-07-25 DIAGNOSIS — I10 ESSENTIAL HYPERTENSION, BENIGN: ICD-10-CM

## 2023-07-25 LAB
ALBUMIN SERPL BCP-MCNC: 4.2 G/DL (ref 3.2–4.9)
ALBUMIN/GLOB SERPL: 1.2 G/DL
ALP SERPL-CCNC: 87 U/L (ref 30–99)
ALT SERPL-CCNC: 42 U/L (ref 2–50)
AMMONIA PLAS-SCNC: 34 UMOL/L (ref 11–45)
AMYLASE SERPL-CCNC: 59 U/L (ref 20–103)
ANION GAP SERPL CALC-SCNC: 9 MMOL/L (ref 7–16)
AST SERPL-CCNC: 28 U/L (ref 12–45)
BILIRUB SERPL-MCNC: 0.2 MG/DL (ref 0.1–1.5)
BUN SERPL-MCNC: 18 MG/DL (ref 8–22)
CALCIUM ALBUM COR SERPL-MCNC: 8.7 MG/DL (ref 8.5–10.5)
CALCIUM SERPL-MCNC: 8.9 MG/DL (ref 8.5–10.5)
CHLORIDE SERPL-SCNC: 106 MMOL/L (ref 96–112)
CO2 SERPL-SCNC: 22 MMOL/L (ref 20–33)
CREAT SERPL-MCNC: 1.05 MG/DL (ref 0.5–1.4)
GFR SERPLBLD CREATININE-BSD FMLA CKD-EPI: 62 ML/MIN/1.73 M 2
GGT SERPL-CCNC: 255 U/L (ref 7–34)
GLOBULIN SER CALC-MCNC: 3.4 G/DL (ref 1.9–3.5)
GLUCOSE SERPL-MCNC: 112 MG/DL (ref 65–99)
HGB RETIC QN AUTO: 33.5 PG/CELL (ref 29–35)
IMM RETICS NFR: 4.7 % (ref 2.6–16.1)
LDH SERPL L TO P-CCNC: 256 U/L (ref 107–266)
LIPASE SERPL-CCNC: 45 U/L (ref 11–82)
POTASSIUM SERPL-SCNC: 3.9 MMOL/L (ref 3.6–5.5)
PROT SERPL-MCNC: 7.6 G/DL (ref 6–8.2)
RETICS # AUTO: 0.1 M/UL (ref 0.04–0.12)
RETICS/RBC NFR: 2 % (ref 0.8–2.6)
SODIUM SERPL-SCNC: 137 MMOL/L (ref 135–145)

## 2023-07-25 PROCEDURE — 3080F DIAST BP >= 90 MM HG: CPT | Performed by: PHYSICIAN ASSISTANT

## 2023-07-25 PROCEDURE — 83690 ASSAY OF LIPASE: CPT

## 2023-07-25 PROCEDURE — 36415 COLL VENOUS BLD VENIPUNCTURE: CPT

## 2023-07-25 PROCEDURE — 86381 MITOCHONDRIAL ANTIBODY EACH: CPT

## 2023-07-25 PROCEDURE — 3077F SYST BP >= 140 MM HG: CPT | Performed by: PHYSICIAN ASSISTANT

## 2023-07-25 PROCEDURE — 82140 ASSAY OF AMMONIA: CPT

## 2023-07-25 PROCEDURE — 80053 COMPREHEN METABOLIC PANEL: CPT

## 2023-07-25 PROCEDURE — 1126F AMNT PAIN NOTED NONE PRSNT: CPT | Performed by: PHYSICIAN ASSISTANT

## 2023-07-25 PROCEDURE — 82977 ASSAY OF GGT: CPT

## 2023-07-25 PROCEDURE — 82150 ASSAY OF AMYLASE: CPT

## 2023-07-25 PROCEDURE — 99214 OFFICE O/P EST MOD 30 MIN: CPT | Performed by: PHYSICIAN ASSISTANT

## 2023-07-25 PROCEDURE — 82390 ASSAY OF CERULOPLASMIN: CPT

## 2023-07-25 PROCEDURE — 85046 RETICYTE/HGB CONCENTRATE: CPT

## 2023-07-25 PROCEDURE — 83615 LACTATE (LD) (LDH) ENZYME: CPT

## 2023-07-25 PROCEDURE — 86015 ACTIN ANTIBODY EACH: CPT

## 2023-07-25 RX ORDER — LISINOPRIL 10 MG/1
10 TABLET ORAL DAILY
Qty: 90 TABLET | Refills: 0 | Status: SHIPPED | OUTPATIENT
Start: 2023-07-25 | End: 2023-11-10

## 2023-07-25 RX ORDER — OMEPRAZOLE 40 MG/1
40 CAPSULE, DELAYED RELEASE ORAL DAILY
Qty: 90 CAPSULE | Refills: 0 | Status: SHIPPED | OUTPATIENT
Start: 2023-07-25 | End: 2023-08-15

## 2023-07-25 ASSESSMENT — PAIN SCALES - GENERAL: PAINLEVEL: NO PAIN

## 2023-07-25 ASSESSMENT — PATIENT HEALTH QUESTIONNAIRE - PHQ9
SUM OF ALL RESPONSES TO PHQ9 QUESTIONS 1 AND 2: 0
9. THOUGHTS THAT YOU WOULD BE BETTER OFF DEAD, OR OF HURTING YOURSELF: NOT AT ALL
2. FEELING DOWN, DEPRESSED, IRRITABLE, OR HOPELESS: NOT AT ALL
SUM OF ALL RESPONSES TO PHQ QUESTIONS 1-9: 0
4. FEELING TIRED OR HAVING LITTLE ENERGY: NOT AT ALL
8. MOVING OR SPEAKING SO SLOWLY THAT OTHER PEOPLE COULD HAVE NOTICED. OR THE OPPOSITE, BEING SO FIGETY OR RESTLESS THAT YOU HAVE BEEN MOVING AROUND A LOT MORE THAN USUAL: NOT AT ALL
3. TROUBLE FALLING OR STAYING ASLEEP OR SLEEPING TOO MUCH: NOT AT ALL
6. FEELING BAD ABOUT YOURSELF - OR THAT YOU ARE A FAILURE OR HAVE LET YOURSELF OR YOUR FAMILY DOWN: NOT AL ALL
5. POOR APPETITE OR OVEREATING: NOT AT ALL
7. TROUBLE CONCENTRATING ON THINGS, SUCH AS READING THE NEWSPAPER OR WATCHING TELEVISION: NOT AT ALL
1. LITTLE INTEREST OR PLEASURE IN DOING THINGS: NOT AT ALL

## 2023-07-25 ASSESSMENT — FIBROSIS 4 INDEX: FIB4 SCORE: 3.5

## 2023-07-25 NOTE — ASSESSMENT & PLAN NOTE
Chronic, uncontrolled.  Telmisartan changed to lisinopril during hospitalization.  States that she has only been taking lisinopril since discharge.  She ran out of amlodipine.

## 2023-07-25 NOTE — PROGRESS NOTES
Subjective:     CC:   Chief Complaint   Patient presents with    GI Problem     X1week, heartburn with certain foods        HPI:   Jennifer presents today with her sister who is assisting with interpreting to discuss:    Hepatitis, acute  Patient hospitalized last month due to acute hepatitis, unclear etiology.  Abdominal ultrasound showed possible acalculous cholecystitis.  Cardiac work-up negative.  Viral hepatitis panel negative.  Associated elevated LDH.  Her metformin and atorvastatin were held.  She states her symptoms resolved but she does get occasional heartburn and dyspepsia.  No recent follow-up lab testing.    Essential hypertension, benign  Chronic, uncontrolled.  Telmisartan changed to lisinopril during hospitalization.  States that she has only been taking lisinopril since discharge.  She ran out of amlodipine.      Past Medical History:   Diagnosis Date    Diabetes (HCC)     Heart murmur     Hypertension     Palpitations      Family History   Problem Relation Age of Onset    Heart Disease Mother     Heart Disease Father     Heart Disease Sister         pacemaker    Stroke Sister     Ovarian Cancer Sister     Heart Disease Brother         transplant     History reviewed. No pertinent surgical history.  Social History     Tobacco Use    Smoking status: Never    Smokeless tobacco: Never   Vaping Use    Vaping Use: Never used   Substance Use Topics    Alcohol use: Never    Drug use: Never     Social History     Social History Narrative    From the [facility]. Moved in 1985.    , no kids.     Current Outpatient Medications Ordered in Epic   Medication Sig Dispense Refill    lisinopril (PRINIVIL) 10 MG Tab Take 1 Tablet by mouth every day. 90 Tablet 0    omeprazole (PRILOSEC) 40 MG delayed-release capsule Take 1 Capsule by mouth every day. 90 Capsule 0     No current Epic-ordered facility-administered medications on file.     Patient has no known allergies.    PMH/PSH/FH/Social history reviewed.   "Vaccinations discussed.  Previous records and labs reviewed. Discussed age appropriate anticipatory guidance.    ROS: see hpi  Gen: no fevers/chills  Pulm: no sob, no cough  CV: no chest pain, no palpitations, no edema  GI: no nausea/vomiting, no diarrhea  Skin: no rash    Objective:   Exam:  BP (!) 142/90 (BP Location: Left arm, Patient Position: Sitting, BP Cuff Size: Adult)   Pulse 72   Temp 36.5 °C (97.7 °F) (Temporal)   Resp 16   Ht 1.651 m (5' 5\")   Wt 65.8 kg (145 lb)   SpO2 99%   BMI 24.13 kg/m²    Body mass index is 24.13 kg/m².    Gen: Alert and oriented, No apparent distress.  HEENT: Head atraumatic, normocephalic. Pupils equal and round.  Neck: Neck is supple without lymphadenopathy.   Lungs: Normal effort, CTA bilaterally, no wheezes, rhonchi, or rales  CV: Regular rate and rhythm. No murmurs, rubs, or gallops.  ABD: +BS. Non-tender, non-distended. No rebound, rigidity, or guarding.  Negative Alvarado sign.  Ext: No clubbing, cyanosis, edema.    Assessment & Plan:     57 y.o. female with the following -     1. Hepatitis, acute  Unclear etiology, check labs below and CT abdomen pelvis.  GI evaluation pending results.  Continue to hold statin and metformin.  Hospital notes reviewed.  - MITOCHONDRIAL (M2) AB; Future  - AMMONIA; Future  - AMYLASE; Future  - ANTI-SMOOTH MUSCLE ABS; Future  - CERULOPLASMIN; Future  - GAMMA GT (GGT); Future  - LIPASE; Future  - RETICULOCYTES COUNT; Future  - CT-ABDOMEN-PELVIS WITH; Future  - LDH; Future  - Comp Metabolic Panel; Future    2. Dyspepsia  Start omeprazole 40 mg daily, GI evaluation to discuss endoscopy if no improvement.  - MITOCHONDRIAL (M2) AB; Future  - AMMONIA; Future  - AMYLASE; Future  - ANTI-SMOOTH MUSCLE ABS; Future  - CERULOPLASMIN; Future  - GAMMA GT (GGT); Future  - LIPASE; Future  - RETICULOCYTES COUNT; Future  - CT-ABDOMEN-PELVIS WITH; Future  - LDH; Future  - Comp Metabolic Panel; Future  - omeprazole (PRILOSEC) 40 MG delayed-release capsule; " Take 1 Capsule by mouth every day.  Dispense: 90 Capsule; Refill: 0    3. High lactic acid dehydrogenase (LDH) level  - MITOCHONDRIAL (M2) AB; Future  - AMMONIA; Future  - AMYLASE; Future  - ANTI-SMOOTH MUSCLE ABS; Future  - CERULOPLASMIN; Future  - GAMMA GT (GGT); Future  - LIPASE; Future  - RETICULOCYTES COUNT; Future  - CT-ABDOMEN-PELVIS WITH; Future  - LDH; Future  - Comp Metabolic Panel; Future    4. Essential hypertension, benign  Chronic, uncontrolled.  Will increase lisinopril 10 mg daily.  May continue to hold amlodipine at this time.  Follow-up in 2 weeks for blood pressure check. Recommend DASH diet, exercise as tolerated. Monitor Blood Pressure at home and report any consistent readings above >140/90. Seek medical help/ER if chest pain, palpitations, shortness of breath, headache, dizziness.   - lisinopril (PRINIVIL) 10 MG Tab; Take 1 Tablet by mouth every day.  Dispense: 90 Tablet; Refill: 0      Return in about 2 weeks (around 8/8/2023) for Follow-up labs/tests, Blood pressure check.    Hien Frey PA-C ([place])  Physician Assistant Certified  [facility]    Please note that this dictation was created using voice recognition software. I have made every reasonable attempt to correct obvious errors, but I expect that there are errors of grammar and possibly content that I did not discover before finalizing the note.

## 2023-07-25 NOTE — ASSESSMENT & PLAN NOTE
Patient hospitalized last month due to acute hepatitis, unclear etiology.  Abdominal ultrasound showed possible acalculous cholecystitis.  Cardiac work-up negative.  Viral hepatitis panel negative.  Associated elevated LDH.  Her metformin and atorvastatin were held.  She states her symptoms resolved but she does get occasional heartburn and dyspepsia.  No recent follow-up lab testing.

## 2023-07-25 NOTE — PATIENT INSTRUCTIONS
It was a pleasure meeting with you today at [facility]!    Your medical history/records and medications were reviewed today.     UPDATE on MyChart Results: If you have blood work, and/or imaging studies, or any other test or procedure completed, you will have access to results as soon as they become available in MyChart. Recently, these results will be available for review at the same time that your provider is able to see results!    This will likely mean you will see a result before your provider has had a chance to review and discuss with you.  Some results or care notes may be hard to understand and may be serious in nature.    We look at every result and your provider will contact you to explain what they mean and discuss appropriate next steps. Please allow for at least 72 business hours for chart and result review.     We prefer that you wait for your care team to contact you with your results.  Often, your provider will discuss your results with you at your next appointment. We look forward to continuing to partner with you in your care.    Please review my practice information below:    If you have any prescription refill requests, please send them via [employer] or discuss with your provider at the start of your office visits. Please allow 3-5 business days for lab and testing review and you will be contacted via [employer] with those results, or if advised to make a follow up appointment regarding those results, then please do so.     Once resulted, your lab/test/imaging results will show up automatically in your MyChart. Please wait for my interpretation and recommendations prior to viewing your results to avoid any unnecessary confusion or misinterpretation. I will address all of the lab values that I interpret as abnormal and message you accordingly on your MyChart. I will always send you a message about your results even if they are normal. If you do not hear back from me within 5-7 business  days after completing your tests, then please send me a message on PAYMEY so I can obtain your results (especially if you went to an outside lab or imaging center - [facility], [facility], etc).     If you have any additional questions or concerns beyond my interpretation of your results, please make an appointment with me to discuss in further detail.    Please only use the [employer] messaging system for questions regarding your most recent appointment or if advised to use otherwise (glucose or blood pressure reporting).     If you have any new problems or concerns, you must make an appointment to discuss. This includes any referral requests, lab requests (unless advised to notify me for pre-appt labs), medication side effects, or request for medication adjustments.     Please arrive 15 minutes prior to your appointment time to complete your check-in and intake with the medical assistant.      Thank you,    Hien Frey PA-C (Baker)  Physician Assistant Certified  [facility]    -----------------------------------------------------------------    Attn: Patients of [facility]:    In an effort to continue to provide excellent and efficient care to our patients, it is vital that we continue to use our resources appropriately. With that, this is a reminder that [employer] is used for prescription refill requests, test results, virtual visits, and chart review only.     Any new questions, concerns/conditions, lab/imaging requests, medication adjustments, new prescriptions, or referral requests do require an appointment (virtually or in person), unless discussed otherwise at your most recent appointment.     Thank you for your understanding,    [facility]

## 2023-07-27 LAB
CERULOPLASMIN SERPL-MCNC: 24 MG/DL (ref 16–45)
MITOCHONDRIA M2 IGG SER-ACNC: 18.8 UNITS (ref 0–24.9)
SMA IGG SER-ACNC: 10 UNITS (ref 0–19)

## 2023-08-09 ENCOUNTER — HOSPITAL ENCOUNTER (OUTPATIENT)
Dept: RADIOLOGY | Facility: MEDICAL CENTER | Age: 58
End: 2023-08-09
Attending: PHYSICIAN ASSISTANT
Payer: COMMERCIAL

## 2023-08-09 DIAGNOSIS — R74.02: ICD-10-CM

## 2023-08-09 DIAGNOSIS — R10.13 DYSPEPSIA: ICD-10-CM

## 2023-08-09 DIAGNOSIS — B17.9 HEPATITIS, ACUTE: ICD-10-CM

## 2023-08-09 PROCEDURE — 700117 HCHG RX CONTRAST REV CODE 255: Performed by: PHYSICIAN ASSISTANT

## 2023-08-09 PROCEDURE — 74177 CT ABD & PELVIS W/CONTRAST: CPT

## 2023-08-09 RX ADMIN — IOHEXOL 25 ML: 240 INJECTION, SOLUTION INTRATHECAL; INTRAVASCULAR; INTRAVENOUS; ORAL at 11:15

## 2023-08-09 RX ADMIN — IOHEXOL 100 ML: 350 INJECTION, SOLUTION INTRAVENOUS at 10:54

## 2023-08-10 DIAGNOSIS — R74.02: ICD-10-CM

## 2023-08-10 DIAGNOSIS — K83.8 COMMON BILE DUCT DILATATION: ICD-10-CM

## 2023-08-10 DIAGNOSIS — B17.9 HEPATITIS, ACUTE: ICD-10-CM

## 2023-08-15 ENCOUNTER — OFFICE VISIT (OUTPATIENT)
Dept: MEDICAL GROUP | Facility: IMAGING CENTER | Age: 58
End: 2023-08-15
Payer: COMMERCIAL

## 2023-08-15 VITALS
DIASTOLIC BLOOD PRESSURE: 72 MMHG | SYSTOLIC BLOOD PRESSURE: 122 MMHG | BODY MASS INDEX: 25.66 KG/M2 | HEIGHT: 65 IN | HEART RATE: 77 BPM | RESPIRATION RATE: 16 BRPM | OXYGEN SATURATION: 97 % | TEMPERATURE: 97.7 F | WEIGHT: 154 LBS

## 2023-08-15 DIAGNOSIS — R74.8 ELEVATED SERUM GGT LEVEL: ICD-10-CM

## 2023-08-15 DIAGNOSIS — B17.9 HEPATITIS, ACUTE: ICD-10-CM

## 2023-08-15 DIAGNOSIS — I10 ESSENTIAL HYPERTENSION, BENIGN: ICD-10-CM

## 2023-08-15 DIAGNOSIS — R19.7 DIARRHEA, UNSPECIFIED TYPE: ICD-10-CM

## 2023-08-15 DIAGNOSIS — R10.10 PAIN OF UPPER ABDOMEN: ICD-10-CM

## 2023-08-15 DIAGNOSIS — K83.8 COMMON BILE DUCT DILATION: ICD-10-CM

## 2023-08-15 DIAGNOSIS — E78.5 DYSLIPIDEMIA: ICD-10-CM

## 2023-08-15 PROBLEM — R74.02: Status: RESOLVED | Noted: 2023-07-25 | Resolved: 2023-08-15

## 2023-08-15 PROBLEM — N72 CERVICITIS: Status: RESOLVED | Noted: 2023-02-21 | Resolved: 2023-08-15

## 2023-08-15 PROBLEM — N93.9 VAGINAL BLEEDING: Status: RESOLVED | Noted: 2023-02-21 | Resolved: 2023-08-15

## 2023-08-15 PROCEDURE — 3074F SYST BP LT 130 MM HG: CPT | Performed by: PHYSICIAN ASSISTANT

## 2023-08-15 PROCEDURE — 99214 OFFICE O/P EST MOD 30 MIN: CPT | Performed by: PHYSICIAN ASSISTANT

## 2023-08-15 PROCEDURE — 1125F AMNT PAIN NOTED PAIN PRSNT: CPT | Performed by: PHYSICIAN ASSISTANT

## 2023-08-15 PROCEDURE — 3078F DIAST BP <80 MM HG: CPT | Performed by: PHYSICIAN ASSISTANT

## 2023-08-15 RX ORDER — ROSUVASTATIN CALCIUM 5 MG/1
5 TABLET, COATED ORAL EVERY EVENING
Qty: 90 TABLET | Refills: 3 | Status: SHIPPED | OUTPATIENT
Start: 2023-08-15

## 2023-08-15 ASSESSMENT — PAIN SCALES - GENERAL: PAINLEVEL: 2=MINIMAL-SLIGHT

## 2023-08-15 ASSESSMENT — FIBROSIS 4 INDEX: FIB4 SCORE: 1.01

## 2023-08-15 NOTE — ASSESSMENT & PLAN NOTE
Patient with hospitalization related to hepatitis.  A follow-up appointment repeat labs and CT abdomen pelvis were ordered.  Patient completed CT abdomen pelvis showing dilated common bile duct, with possible choledocholithiasis. Consider MRCP for further evaluation.     Labs show improvement of LDH and LFTs, but GGT remains elevated.    Patient no longer taking omeprazole 40 mg daily.

## 2023-08-15 NOTE — ASSESSMENT & PLAN NOTE
Patient states that she had one loose BM 1 loose bowel movement this morning.  She has been having intermittent mid to upper abdominal pain.  No right upper quadrant pain, nausea, vomiting, fever, jaundice.  No association with food.

## 2023-08-15 NOTE — PATIENT INSTRUCTIONS
It was a pleasure meeting with you today at [facility]!    Your medical history/records and medications were reviewed today.     UPDATE on MyChart Results: If you have blood work, and/or imaging studies, or any other test or procedure completed, you will have access to results as soon as they become available in MyChart. Recently, these results will be available for review at the same time that your provider is able to see results!    This will likely mean you will see a result before your provider has had a chance to review and discuss with you.  Some results or care notes may be hard to understand and may be serious in nature.    We look at every result and your provider will contact you to explain what they mean and discuss appropriate next steps. Please allow for at least 72 business hours for chart and result review.     We prefer that you wait for your care team to contact you with your results.  Often, your provider will discuss your results with you at your next appointment. We look forward to continuing to partner with you in your care.    Please review my practice information below:    If you have any prescription refill requests, please send them via [employer] or discuss with your provider at the start of your office visits. Please allow 3-5 business days for lab and testing review and you will be contacted via [employer] with those results, or if advised to make a follow up appointment regarding those results, then please do so.     Once resulted, your lab/test/imaging results will show up automatically in your MyChart. Please wait for my interpretation and recommendations prior to viewing your results to avoid any unnecessary confusion or misinterpretation. I will address all of the lab values that I interpret as abnormal and message you accordingly on your MyChart. I will always send you a message about your results even if they are normal. If you do not hear back from me within 5-7 business  days after completing your tests, then please send me a message on [employer]olio so I can obtain your results (especially if you went to an outside lab or imaging center - [facility], [facility], etc).     If you have any additional questions or concerns beyond my interpretation of your results, please make an appointment with me to discuss in further detail.    Please only use the [employer] messaging system for questions regarding your most recent appointment or if advised to use otherwise (glucose or blood pressure reporting).     If you have any new problems or concerns, you must make an appointment to discuss. This includes any referral requests, lab requests (unless advised to notify me for pre-appt labs), medication side effects, or request for medication adjustments.     Please arrive 15 minutes prior to your appointment time to complete your check-in and intake with the medical assistant.      Thank you,    Hien Frey PA-C (Baker)  Physician Assistant Certified  [facility]    -----------------------------------------------------------------    Attn: Patients of [facility]:    In an effort to continue to provide excellent and efficient care to our patients, it is vital that we continue to use our resources appropriately. With that, this is a reminder that [employer] is used for prescription refill requests, test results, virtual visits, and chart review only.     Any new questions, concerns/conditions, lab/imaging requests, medication adjustments, new prescriptions, or referral requests do require an appointment (virtually or in person), unless discussed otherwise at your most recent appointment.     Thank you for your understanding,    [facility]

## 2023-08-15 NOTE — PROGRESS NOTES
Subjective:     CC:   Chief Complaint   Patient presents with    Follow-Up     On lab results     Hypertension Follow-up    Abdominal Pain     With diarrhea x1day       HPI:   Jennifer presents today with her sister to discuss:    Pain of upper abdomen  Patient with hospitalization related to hepatitis.  A follow-up appointment repeat labs and CT abdomen pelvis were ordered.  Patient completed CT abdomen pelvis showing dilated common bile duct, with possible choledocholithiasis. Consider MRCP for further evaluation.     Labs show improvement of LDH and LFTs, but GGT remains elevated.    Patient no longer taking omeprazole 40 mg daily.    Essential hypertension, benign  Chronic, controlled and stable on lisinopril 10 mg daily.    Dyslipidemia  Chronic, statin stopped during hospitalization due to hepatitis.    Diarrhea  Patient states that she had one loose BM 1 loose bowel movement this morning.  She has been having intermittent mid to upper abdominal pain.  No right upper quadrant pain, nausea, vomiting, fever, jaundice.  No association with food.      Past Medical History:   Diagnosis Date    Diabetes (HCC)     Heart murmur     Hepatitis, acute 6/20/2023    Hypertension     Palpitations      Family History   Problem Relation Age of Onset    Heart Disease Mother     Heart Disease Father     Heart Disease Sister         pacemaker    Stroke Sister     Ovarian Cancer Sister     Heart Disease Brother         transplant     History reviewed. No pertinent surgical history.  Social History     Tobacco Use    Smoking status: Never    Smokeless tobacco: Never   Vaping Use    Vaping Use: Never used   Substance Use Topics    Alcohol use: Never    Drug use: Never     Social History     Social History Narrative    From the [facility]. Moved in 1985.    , no kids.     Current Outpatient Medications Ordered in Epic   Medication Sig Dispense Refill    rosuvastatin (CRESTOR) 5 MG Tab Take 1 Tablet by mouth every evening. 90  "Tablet 3    lisinopril (PRINIVIL) 10 MG Tab Take 1 Tablet by mouth every day. 90 Tablet 0     No current Epic-ordered facility-administered medications on file.     Patient has no known allergies.    PMH/PSH/FH/Social history reviewed.  Vaccinations discussed.  Previous records and labs reviewed. Discussed age appropriate anticipatory guidance.    ROS: see hpi  Gen: no fevers/chills  Pulm: no sob, no cough  CV: no chest pain, no palpitations, no edema  GI: no nausea/vomiting, no diarrhea  Skin: no rash    Objective:   Exam:  /72 (BP Location: Left arm, Patient Position: Sitting, BP Cuff Size: Adult)   Pulse 77   Temp 36.5 °C (97.7 °F) (Temporal)   Resp 16   Ht 1.651 m (5' 5\")   Wt 69.9 kg (154 lb)   SpO2 97%   BMI 25.63 kg/m²    Body mass index is 25.63 kg/m².    Gen: Alert and oriented, No apparent distress.  HEENT: Head atraumatic, normocephalic. Pupils equal and round.  No scleral icterus.  Neck: Neck is supple without lymphadenopathy.   Lungs: Normal effort, CTA bilaterally, no wheezes, rhonchi, or rales  CV: Regular rate and rhythm. No murmurs, rubs, or gallops.  ABD: +BS. Non-tender, non-distended. No rebound, rigidity, or guarding.  Negative Alvarado sign.  Ext: No clubbing, cyanosis, edema.    Assessment & Plan:     57 y.o. female with the following -     1. Pain of upper abdomen  Intermittent, no active symptoms.  CT with common bile duct dilation.  Check stat MRCP.  Labs improving.  Urgent referral to GI for management.  ER if symptoms worsen or if you develop nausea, vomiting, fever, jaundice.  - Referral to Gastroenterology    2. Common bile duct dilation  - Referral to Gastroenterology    3. Diarrhea, unspecified type  Check stool test if symptoms persist past 72 hours.  - CALPROTECTIN,FECAL; Future  - CDIFF BY PCR; Future  - CULTURE STOOL; Future  - CRYPTO/GIARDIA RAPID ASSAY; Future    4. Hepatitis, acute  - Referral to Gastroenterology    5. Elevated serum GGT level  - Referral to " Gastroenterology    6. Essential hypertension, benign  Chronic, controlled and stable. Continue current regimen -lisinopril 10 mg daily. Recommend DASH diet, exercise as tolerated. Monitor Blood Pressure at home and report any consistent readings above >140/90. Seek medical help/ER if chest pain, palpitations, shortness of breath, headache, dizziness.     7. Dyslipidemia  Chronic, will we will restart low-dose statin therapy.  Monitor LFTs.  - rosuvastatin (CRESTOR) 5 MG Tab; Take 1 Tablet by mouth every evening.  Dispense: 90 Tablet; Refill: 3    Return for Will notify patient to follow-up pending tests.    Hien Frey PA-C (Baker)  Physician Assistant Certified  [facility]    Please note that this dictation was created using voice recognition software. I have made every reasonable attempt to correct obvious errors, but I expect that there are errors of grammar and possibly content that I did not discover before finalizing the note.

## 2023-08-16 ENCOUNTER — HOSPITAL ENCOUNTER (OUTPATIENT)
Dept: RADIOLOGY | Facility: MEDICAL CENTER | Age: 58
End: 2023-08-16
Attending: PHYSICIAN ASSISTANT
Payer: COMMERCIAL

## 2023-08-16 DIAGNOSIS — R74.02: ICD-10-CM

## 2023-08-16 DIAGNOSIS — K83.8 COMMON BILE DUCT DILATATION: ICD-10-CM

## 2023-08-16 DIAGNOSIS — B17.9 HEPATITIS, ACUTE: ICD-10-CM

## 2023-08-16 PROCEDURE — 74181 MRI ABDOMEN W/O CONTRAST: CPT

## 2023-08-17 DIAGNOSIS — K80.71 CALCULUS OF GALLBLADDER AND BILE DUCT WITH OBSTRUCTION WITHOUT CHOLECYSTITIS: ICD-10-CM

## 2023-08-17 DIAGNOSIS — R10.10 PAIN OF UPPER ABDOMEN: ICD-10-CM

## 2023-08-17 DIAGNOSIS — K83.8 COMMON BILE DUCT DILATION: ICD-10-CM

## 2023-08-17 NOTE — PROGRESS NOTES
Spoke with patient's Sister Sandra about results, she will relay message to sister due to language barrier.  MRCP shows possible cholelithiasis and possible choledocholithiasis.  Will refer to general surgery for stat evaluation.    ER if you develop right upper quadrant pain, jaundice, nausea, vomiting, fever.    Hien Barakat) Tk VERGARA  Physician Assistant Certified  [facility]

## 2023-08-21 NOTE — PROGRESS NOTES
Subjective:      Primary care physician: Hien Frey P.A.-C.  Referring Provider: Hien Frey P.A.-C.    Chief Complaint: Bile duct abnormality    Diagnosis:   1. Common bile duct dilatation        2. Pain of upper abdomen        3. Calculus of gallbladder and bile duct with obstruction without cholecystitis        4. Type 2 diabetes mellitus without complication, without long-term current use of insulin (HCC)          History of presenting illness:    Jennifer Saleh is a pleasant 57 y.o. female with history of hospitalization related to hepatitis with unclear etiology. Viral hepatitis panel negative. Referred for concern dilated common bile duct, cholelithiasis, and upper abdominal pain.    CT ABDOMEN on 8/9/23 noted dilated common bile duct, with possible choledocholithiasis.     MR ABDOMEN on 8/16/23 noted possible cholelithiasis versus small polyps within the gallbladder. oval-shaped slightly curvilinear lesion in the intrapancreatic portion of the common bile duct is identified. Common duct stone is a possibility. Stricture or septation or small tumor is also possible. Common bile duct proximal to this site is somewhat dilated measuring up to 9.6 mm in diameter. Mild intrahepatic ductal dilatation. No pancreatic ductal dilatation is identified.    She is here today for further evaluation.  The patient has occasional nausea and mid abdominal pain.  She also does describe some right upper quadrant pain on occasion.  Weight has been stable.  Mild nausea on occasion without vomiting.    Past Medical History:   Diagnosis Date    Diabetes (HCC)     Heart murmur     Hepatitis, acute 6/20/2023    Hypertension     Palpitations      No past surgical history on file.  No Known Allergies  Outpatient Encounter Medications as of 8/23/2023   Medication Sig Dispense Refill    rosuvastatin (CRESTOR) 5 MG Tab Take 1 Tablet by mouth every evening. 90 Tablet 3    lisinopril (PRINIVIL) 10 MG Tab Take 1 Tablet by mouth  every day. 90 Tablet 0     No facility-administered encounter medications on file as of 8/23/2023.     Social History     Socioeconomic History    Marital status:      Spouse name: Not on file    Number of children: Not on file    Years of education: Not on file    Highest education level: Not on file   Occupational History    Not on file   Tobacco Use    Smoking status: Never    Smokeless tobacco: Never   Vaping Use    Vaping Use: Never used   Substance and Sexual Activity    Alcohol use: Never    Drug use: Never    Sexual activity: Not on file   Other Topics Concern    Not on file   Social History Narrative    From the [facility]. Moved in 1985.    , no kids.     Social Determinants of Health     Financial Resource Strain: Not on file   Food Insecurity: Not on file   Transportation Needs: Not on file   Physical Activity: Not on file   Stress: Not on file   Social Connections: Not on file   Intimate Partner Violence: Not on file   Housing Stability: Not on file      Social History     Tobacco Use   Smoking Status Never   Smokeless Tobacco Never     Social History     Substance and Sexual Activity   Alcohol Use Never     Social History     Substance and Sexual Activity   Drug Use Never      Family History   Problem Relation Age of Onset    Heart Disease Mother     Heart Disease Father     Heart Disease Sister         pacemaker    Stroke Sister     Ovarian Cancer Sister     Heart Disease Brother         transplant     Review of Systems   Constitutional:  Positive for weight loss. Negative for chills, diaphoresis, fever and malaise/fatigue.   HENT: Negative.     Eyes: Negative.    Respiratory: Negative.     Cardiovascular: Negative.    Gastrointestinal:  Positive for abdominal pain, nausea and vomiting. Negative for blood in stool, constipation, diarrhea and heartburn.   Skin: Negative.         Objective:   There were no vitals taken for this visit.    Physical Exam  Constitutional:       Appearance:  Normal appearance.   HENT:      Head: Normocephalic and atraumatic.      Mouth/Throat:      Mouth: Mucous membranes are moist.   Eyes:      General: No scleral icterus.     Extraocular Movements: Extraocular movements intact.      Conjunctiva/sclera: Conjunctivae normal.      Pupils: Pupils are equal, round, and reactive to light.   Cardiovascular:      Rate and Rhythm: Normal rate and regular rhythm.   Pulmonary:      Effort: Pulmonary effort is normal.      Breath sounds: Normal breath sounds.   Abdominal:      General: Bowel sounds are normal.      Palpations: There is no mass.      Tenderness: There is abdominal tenderness.      Hernia: No hernia is present.   Neurological:      Mental Status: She is alert.         Labs   Latest Reference Range & Units 06/22/23 00:39   WBC 4.8 - 10.8 K/uL 5.3   RBC 4.20 - 5.40 M/uL 4.95   Hemoglobin 12.0 - 16.0 g/dL 14.5   Hematocrit 37.0 - 47.0 % 45.1   MCV 81.4 - 97.8 fL 91.1   MCH 27.0 - 33.0 pg 29.3   MCHC 32.2 - 35.5 g/dL 32.2   RDW 35.9 - 50.0 fL 43.9   Platelet Count 164 - 446 K/uL 240   MPV 9.0 - 12.9 fL 9.4      Latest Reference Range & Units 07/25/23 11:32   Sodium 135 - 145 mmol/L 137   Potassium 3.6 - 5.5 mmol/L 3.9   Chloride 96 - 112 mmol/L 106   Co2 20 - 33 mmol/L 22   Anion Gap 7.0 - 16.0  9.0   Glucose 65 - 99 mg/dL 112 (H)   Bun 8 - 22 mg/dL 18   Creatinine 0.50 - 1.40 mg/dL 1.05   GFR (CKD-EPI) >60 mL/min/1.73 m 2 62   Calcium 8.5 - 10.5 mg/dL 8.9   Correct Calcium 8.5 - 10.5 mg/dL 8.7   AST(SGOT) 12 - 45 U/L 28   ALT(SGPT) 2 - 50 U/L 42   Alkaline Phosphatase 30 - 99 U/L 87   Total Bilirubin 0.1 - 1.5 mg/dL 0.2   Albumin 3.2 - 4.9 g/dL 4.2   Total Protein 6.0 - 8.2 g/dL 7.6   Globulin 1.9 - 3.5 g/dL 3.4   A-G Ratio g/dL 1.2   Lipase 11 - 82 U/L 45   Amylase 20 - 103 U/L 59   LDH Total 107 - 266 U/L 256   Gamma Gt 7 - 34 U/L 255 (H)   Ammonia 11 - 45 umol/L 34   (H): Data is abnormally high     Imaging  MR ABDOMEN (8/16/23)  IMPRESSION:     1.  Possible  cholelithiasis versus small polyps within the gallbladder.     2.  Oval-shaped slightly curvilinear lesion in the intrapancreatic portion of the common bile duct is identified. Common duct stone is a possibility. Stricture or septation or small tumor is also possible.     3.  Common bile duct proximal to this site is somewhat dilated measuring up to 9.6 mm in diameter.     4.  Mild intrahepatic ductal dilatation.     5.  No pancreatic ductal dilatation is identified.    CT ABDOMEN (8/9/23)  IMPRESSION:  1. Dilated common bile duct, with possible choledocholithiasis. Consider MRCP for further evaluation.  2. Otherwise, negative.    Pathology  N/A    Procedures  N/A      Diagnosis:     1. Common bile duct dilatation        2. Pain of upper abdomen        3. Calculus of gallbladder and bile duct with obstruction without cholecystitis        4. Type 2 diabetes mellitus without complication, without long-term current use of insulin (HCC)            Medical Decision Making:  Today's Assessment / Status / Plan:     The patient has likely cholelithiasis with question of choledocholithiasis.  There also is a possibility that the patient could have a cholangiocarcinoma.    The patient will need EUS and ERCP to further evaluate her bile duct.  Further plans based on what those tests show.  I will see her back after testing.    Greater than 45 minutes were spent with the patient.  This included review of outside records and imaging, counseling of the patient and coordination of care.    I, Dr Davila, have entered, reviewed and confirmed the above diagnoses related to this patient on this date of service, as per the time and date noted at top of this note.

## 2023-08-23 ENCOUNTER — OFFICE VISIT (OUTPATIENT)
Dept: SURGICAL ONCOLOGY | Facility: MEDICAL CENTER | Age: 58
End: 2023-08-23
Payer: COMMERCIAL

## 2023-08-23 VITALS
DIASTOLIC BLOOD PRESSURE: 74 MMHG | TEMPERATURE: 98.2 F | SYSTOLIC BLOOD PRESSURE: 122 MMHG | BODY MASS INDEX: 24.43 KG/M2 | OXYGEN SATURATION: 97 % | WEIGHT: 146.83 LBS | HEART RATE: 83 BPM

## 2023-08-23 DIAGNOSIS — E11.9 TYPE 2 DIABETES MELLITUS WITHOUT COMPLICATION, WITHOUT LONG-TERM CURRENT USE OF INSULIN (HCC): ICD-10-CM

## 2023-08-23 DIAGNOSIS — R10.10 PAIN OF UPPER ABDOMEN: ICD-10-CM

## 2023-08-23 DIAGNOSIS — K83.8 COMMON BILE DUCT DILATATION: ICD-10-CM

## 2023-08-23 DIAGNOSIS — K80.71 CALCULUS OF GALLBLADDER AND BILE DUCT WITH OBSTRUCTION WITHOUT CHOLECYSTITIS: ICD-10-CM

## 2023-08-23 PROCEDURE — 99204 OFFICE O/P NEW MOD 45 MIN: CPT | Performed by: SURGERY

## 2023-08-23 PROCEDURE — 3074F SYST BP LT 130 MM HG: CPT | Performed by: SURGERY

## 2023-08-23 PROCEDURE — 3078F DIAST BP <80 MM HG: CPT | Performed by: SURGERY

## 2023-08-23 ASSESSMENT — ENCOUNTER SYMPTOMS
FEVER: 0
EYES NEGATIVE: 1
HEARTBURN: 0
NAUSEA: 1
CONSTIPATION: 0
DIAPHORESIS: 0
RESPIRATORY NEGATIVE: 1
DIARRHEA: 0
BLOOD IN STOOL: 0
ABDOMINAL PAIN: 1
VOMITING: 1
CHILLS: 0
WEIGHT LOSS: 1
CARDIOVASCULAR NEGATIVE: 1

## 2023-08-23 ASSESSMENT — FIBROSIS 4 INDEX: FIB4 SCORE: 1.01

## 2023-11-10 DIAGNOSIS — I10 ESSENTIAL HYPERTENSION, BENIGN: ICD-10-CM

## 2023-11-10 RX ORDER — LISINOPRIL 10 MG/1
10 TABLET ORAL DAILY
Qty: 30 TABLET | Refills: 2 | Status: SHIPPED | OUTPATIENT
Start: 2023-11-10

## 2024-02-07 ENCOUNTER — APPOINTMENT (OUTPATIENT)
Dept: RADIOLOGY | Facility: MEDICAL CENTER | Age: 59
End: 2024-02-07
Attending: STUDENT IN AN ORGANIZED HEALTH CARE EDUCATION/TRAINING PROGRAM
Payer: COMMERCIAL

## 2024-02-07 ENCOUNTER — HOSPITAL ENCOUNTER (OUTPATIENT)
Facility: MEDICAL CENTER | Age: 59
End: 2024-02-08
Attending: STUDENT IN AN ORGANIZED HEALTH CARE EDUCATION/TRAINING PROGRAM | Admitting: STUDENT IN AN ORGANIZED HEALTH CARE EDUCATION/TRAINING PROGRAM
Payer: COMMERCIAL

## 2024-02-07 DIAGNOSIS — R42 VERTIGO: ICD-10-CM

## 2024-02-07 DIAGNOSIS — I10 PRIMARY HYPERTENSION: ICD-10-CM

## 2024-02-07 DIAGNOSIS — R27.0 ATAXIA: ICD-10-CM

## 2024-02-07 DIAGNOSIS — H81.393 PERIPHERAL VERTIGO OF BOTH EARS: ICD-10-CM

## 2024-02-07 PROBLEM — R11.2 NAUSEA & VOMITING: Status: ACTIVE | Noted: 2024-02-07

## 2024-02-07 LAB
ABO + RH BLD: NORMAL
ABO GROUP BLD: NORMAL
ALBUMIN SERPL BCP-MCNC: 4.5 G/DL (ref 3.2–4.9)
ALBUMIN/GLOB SERPL: 1.4 G/DL
ALP SERPL-CCNC: 73 U/L (ref 30–99)
ALT SERPL-CCNC: 21 U/L (ref 2–50)
ANION GAP SERPL CALC-SCNC: 14 MMOL/L (ref 7–16)
APTT PPP: 32 SEC (ref 24.7–36)
AST SERPL-CCNC: 29 U/L (ref 12–45)
BASOPHILS # BLD AUTO: 0.4 % (ref 0–1.8)
BASOPHILS # BLD: 0.03 K/UL (ref 0–0.12)
BILIRUB SERPL-MCNC: 0.3 MG/DL (ref 0.1–1.5)
BLD GP AB SCN SERPL QL: NORMAL
BUN SERPL-MCNC: 15 MG/DL (ref 8–22)
CALCIUM ALBUM COR SERPL-MCNC: 8.7 MG/DL (ref 8.5–10.5)
CALCIUM SERPL-MCNC: 9.1 MG/DL (ref 8.5–10.5)
CHLORIDE SERPL-SCNC: 105 MMOL/L (ref 96–112)
CO2 SERPL-SCNC: 21 MMOL/L (ref 20–33)
CREAT SERPL-MCNC: 0.88 MG/DL (ref 0.5–1.4)
EKG IMPRESSION: NORMAL
EOSINOPHIL # BLD AUTO: 0.08 K/UL (ref 0–0.51)
EOSINOPHIL NFR BLD: 1.2 % (ref 0–6.9)
ERYTHROCYTE [DISTWIDTH] IN BLOOD BY AUTOMATED COUNT: 41.1 FL (ref 35.9–50)
EST. AVERAGE GLUCOSE BLD GHB EST-MCNC: 128 MG/DL
GFR SERPLBLD CREATININE-BSD FMLA CKD-EPI: 76 ML/MIN/1.73 M 2
GLOBULIN SER CALC-MCNC: 3.3 G/DL (ref 1.9–3.5)
GLUCOSE BLD STRIP.AUTO-MCNC: 125 MG/DL (ref 65–99)
GLUCOSE SERPL-MCNC: 117 MG/DL (ref 65–99)
HBA1C MFR BLD: 6.1 % (ref 4–5.6)
HCT VFR BLD AUTO: 45 % (ref 37–47)
HGB BLD-MCNC: 15.3 G/DL (ref 12–16)
IMM GRANULOCYTES # BLD AUTO: 0.01 K/UL (ref 0–0.11)
IMM GRANULOCYTES NFR BLD AUTO: 0.1 % (ref 0–0.9)
INR PPP: 1 (ref 0.87–1.13)
LYMPHOCYTES # BLD AUTO: 2.08 K/UL (ref 1–4.8)
LYMPHOCYTES NFR BLD: 31 % (ref 22–41)
MAGNESIUM SERPL-MCNC: 2.2 MG/DL (ref 1.5–2.5)
MCH RBC QN AUTO: 29.7 PG (ref 27–33)
MCHC RBC AUTO-ENTMCNC: 34 G/DL (ref 32.2–35.5)
MCV RBC AUTO: 87.2 FL (ref 81.4–97.8)
MONOCYTES # BLD AUTO: 0.4 K/UL (ref 0–0.85)
MONOCYTES NFR BLD AUTO: 6 % (ref 0–13.4)
NEUTROPHILS # BLD AUTO: 4.11 K/UL (ref 1.82–7.42)
NEUTROPHILS NFR BLD: 61.3 % (ref 44–72)
NRBC # BLD AUTO: 0 K/UL
NRBC BLD-RTO: 0 /100 WBC (ref 0–0.2)
PLATELET # BLD AUTO: 268 K/UL (ref 164–446)
PMV BLD AUTO: 9.2 FL (ref 9–12.9)
POTASSIUM SERPL-SCNC: 3.8 MMOL/L (ref 3.6–5.5)
PROT SERPL-MCNC: 7.8 G/DL (ref 6–8.2)
PROTHROMBIN TIME: 13.3 SEC (ref 12–14.6)
RBC # BLD AUTO: 5.16 M/UL (ref 4.2–5.4)
RH BLD: NORMAL
SODIUM SERPL-SCNC: 140 MMOL/L (ref 135–145)
TROPONIN T SERPL-MCNC: 7 NG/L (ref 6–19)
WBC # BLD AUTO: 6.7 K/UL (ref 4.8–10.8)

## 2024-02-07 PROCEDURE — 70498 CT ANGIOGRAPHY NECK: CPT

## 2024-02-07 PROCEDURE — 86850 RBC ANTIBODY SCREEN: CPT

## 2024-02-07 PROCEDURE — 84484 ASSAY OF TROPONIN QUANT: CPT

## 2024-02-07 PROCEDURE — 96374 THER/PROPH/DIAG INJ IV PUSH: CPT

## 2024-02-07 PROCEDURE — 85025 COMPLETE CBC W/AUTO DIFF WBC: CPT

## 2024-02-07 PROCEDURE — 70496 CT ANGIOGRAPHY HEAD: CPT

## 2024-02-07 PROCEDURE — 70450 CT HEAD/BRAIN W/O DYE: CPT

## 2024-02-07 PROCEDURE — 93005 ELECTROCARDIOGRAM TRACING: CPT | Performed by: STUDENT IN AN ORGANIZED HEALTH CARE EDUCATION/TRAINING PROGRAM

## 2024-02-07 PROCEDURE — 83735 ASSAY OF MAGNESIUM: CPT

## 2024-02-07 PROCEDURE — 0042T CT-CEREBRAL PERFUSION ANALYSIS: CPT

## 2024-02-07 PROCEDURE — 700111 HCHG RX REV CODE 636 W/ 250 OVERRIDE (IP): Performed by: STUDENT IN AN ORGANIZED HEALTH CARE EDUCATION/TRAINING PROGRAM

## 2024-02-07 PROCEDURE — 83036 HEMOGLOBIN GLYCOSYLATED A1C: CPT

## 2024-02-07 PROCEDURE — 86901 BLOOD TYPING SEROLOGIC RH(D): CPT

## 2024-02-07 PROCEDURE — 99223 1ST HOSP IP/OBS HIGH 75: CPT | Performed by: STUDENT IN AN ORGANIZED HEALTH CARE EDUCATION/TRAINING PROGRAM

## 2024-02-07 PROCEDURE — 99284 EMERGENCY DEPT VISIT MOD MDM: CPT | Performed by: PSYCHIATRY & NEUROLOGY

## 2024-02-07 PROCEDURE — 85610 PROTHROMBIN TIME: CPT

## 2024-02-07 PROCEDURE — 82962 GLUCOSE BLOOD TEST: CPT

## 2024-02-07 PROCEDURE — 85730 THROMBOPLASTIN TIME PARTIAL: CPT

## 2024-02-07 PROCEDURE — 80053 COMPREHEN METABOLIC PANEL: CPT

## 2024-02-07 PROCEDURE — 96375 TX/PRO/DX INJ NEW DRUG ADDON: CPT

## 2024-02-07 PROCEDURE — 86900 BLOOD TYPING SEROLOGIC ABO: CPT

## 2024-02-07 PROCEDURE — 99285 EMERGENCY DEPT VISIT HI MDM: CPT

## 2024-02-07 PROCEDURE — 36415 COLL VENOUS BLD VENIPUNCTURE: CPT

## 2024-02-07 PROCEDURE — 700117 HCHG RX CONTRAST REV CODE 255: Performed by: STUDENT IN AN ORGANIZED HEALTH CARE EDUCATION/TRAINING PROGRAM

## 2024-02-07 RX ORDER — HYDRALAZINE HYDROCHLORIDE 20 MG/ML
10 INJECTION INTRAMUSCULAR; INTRAVENOUS EVERY 4 HOURS PRN
Status: DISCONTINUED | OUTPATIENT
Start: 2024-02-07 | End: 2024-02-08

## 2024-02-07 RX ORDER — ONDANSETRON 2 MG/ML
4 INJECTION INTRAMUSCULAR; INTRAVENOUS EVERY 4 HOURS PRN
Status: DISCONTINUED | OUTPATIENT
Start: 2024-02-07 | End: 2024-02-08 | Stop reason: HOSPADM

## 2024-02-07 RX ORDER — ONDANSETRON 4 MG/1
4 TABLET, ORALLY DISINTEGRATING ORAL EVERY 4 HOURS PRN
Status: DISCONTINUED | OUTPATIENT
Start: 2024-02-07 | End: 2024-02-08 | Stop reason: HOSPADM

## 2024-02-07 RX ORDER — ATORVASTATIN CALCIUM 80 MG/1
80 TABLET, FILM COATED ORAL EVERY EVENING
Status: DISCONTINUED | OUTPATIENT
Start: 2024-02-08 | End: 2024-02-08 | Stop reason: HOSPADM

## 2024-02-07 RX ORDER — LABETALOL HYDROCHLORIDE 5 MG/ML
10 INJECTION, SOLUTION INTRAVENOUS ONCE
Status: COMPLETED | OUTPATIENT
Start: 2024-02-07 | End: 2024-02-07

## 2024-02-07 RX ORDER — ASPIRIN 81 MG/1
81 TABLET ORAL DAILY
Status: DISCONTINUED | OUTPATIENT
Start: 2024-02-08 | End: 2024-02-08 | Stop reason: HOSPADM

## 2024-02-07 RX ORDER — PROCHLORPERAZINE EDISYLATE 5 MG/ML
5-10 INJECTION INTRAMUSCULAR; INTRAVENOUS EVERY 4 HOURS PRN
Status: DISCONTINUED | OUTPATIENT
Start: 2024-02-07 | End: 2024-02-08 | Stop reason: HOSPADM

## 2024-02-07 RX ORDER — ENOXAPARIN SODIUM 100 MG/ML
40 INJECTION SUBCUTANEOUS DAILY
Status: DISCONTINUED | OUTPATIENT
Start: 2024-02-07 | End: 2024-02-07

## 2024-02-07 RX ORDER — PROMETHAZINE HYDROCHLORIDE 25 MG/1
12.5-25 SUPPOSITORY RECTAL EVERY 4 HOURS PRN
Status: DISCONTINUED | OUTPATIENT
Start: 2024-02-07 | End: 2024-02-08 | Stop reason: HOSPADM

## 2024-02-07 RX ORDER — PROMETHAZINE HYDROCHLORIDE 25 MG/1
12.5-25 TABLET ORAL EVERY 4 HOURS PRN
Status: DISCONTINUED | OUTPATIENT
Start: 2024-02-07 | End: 2024-02-08 | Stop reason: HOSPADM

## 2024-02-07 RX ORDER — LISINOPRIL 10 MG/1
10 TABLET ORAL DAILY
Status: DISCONTINUED | OUTPATIENT
Start: 2024-02-08 | End: 2024-02-08 | Stop reason: HOSPADM

## 2024-02-07 RX ORDER — DEXTROSE MONOHYDRATE 25 G/50ML
25 INJECTION, SOLUTION INTRAVENOUS
Status: DISCONTINUED | OUTPATIENT
Start: 2024-02-07 | End: 2024-02-08 | Stop reason: HOSPADM

## 2024-02-07 RX ORDER — ROSUVASTATIN CALCIUM 5 MG/1
5 TABLET, COATED ORAL EVERY EVENING
Status: DISCONTINUED | OUTPATIENT
Start: 2024-02-07 | End: 2024-02-07

## 2024-02-07 RX ORDER — MECLIZINE HYDROCHLORIDE 25 MG/1
12.5 TABLET ORAL 3 TIMES DAILY PRN
Status: DISCONTINUED | OUTPATIENT
Start: 2024-02-07 | End: 2024-02-08 | Stop reason: HOSPADM

## 2024-02-07 RX ADMIN — IOHEXOL 65 ML: 350 INJECTION, SOLUTION INTRAVENOUS at 21:52

## 2024-02-07 RX ADMIN — HYDRALAZINE HYDROCHLORIDE 10 MG: 20 INJECTION, SOLUTION INTRAMUSCULAR; INTRAVENOUS at 23:12

## 2024-02-07 RX ADMIN — LABETALOL HYDROCHLORIDE 10 MG: 5 INJECTION, SOLUTION INTRAVENOUS at 21:25

## 2024-02-07 RX ADMIN — IOHEXOL 40 ML: 350 INJECTION, SOLUTION INTRAVENOUS at 21:47

## 2024-02-07 ASSESSMENT — ENCOUNTER SYMPTOMS
TINGLING: 0
DIARRHEA: 0
ORTHOPNEA: 0
TREMORS: 0
SPUTUM PRODUCTION: 0
CHILLS: 0
INSOMNIA: 0
SINUS PAIN: 0
NECK PAIN: 0
BACK PAIN: 0
HEADACHES: 1
SENSORY CHANGE: 1
HEMOPTYSIS: 0
VOMITING: 1
NERVOUS/ANXIOUS: 0
FEVER: 0
DIZZINESS: 1
FOCAL WEAKNESS: 0
HALLUCINATIONS: 0
NAUSEA: 1
ABDOMINAL PAIN: 0
PALPITATIONS: 0
SPEECH CHANGE: 0
SHORTNESS OF BREATH: 0

## 2024-02-07 ASSESSMENT — LIFESTYLE VARIABLES: SUBSTANCE_ABUSE: 0

## 2024-02-07 ASSESSMENT — FIBROSIS 4 INDEX: FIB4 SCORE: 1.4

## 2024-02-08 ENCOUNTER — APPOINTMENT (OUTPATIENT)
Dept: RADIOLOGY | Facility: MEDICAL CENTER | Age: 59
End: 2024-02-08
Attending: STUDENT IN AN ORGANIZED HEALTH CARE EDUCATION/TRAINING PROGRAM
Payer: COMMERCIAL

## 2024-02-08 VITALS
WEIGHT: 145 LBS | OXYGEN SATURATION: 95 % | RESPIRATION RATE: 17 BRPM | HEIGHT: 65 IN | BODY MASS INDEX: 24.16 KG/M2 | DIASTOLIC BLOOD PRESSURE: 64 MMHG | SYSTOLIC BLOOD PRESSURE: 120 MMHG | TEMPERATURE: 98 F | HEART RATE: 69 BPM

## 2024-02-08 PROBLEM — H81.393 PERIPHERAL VERTIGO OF BOTH EARS: Status: ACTIVE | Noted: 2024-02-07

## 2024-02-08 PROBLEM — R73.03 PREDIABETES: Status: ACTIVE | Noted: 2022-05-13

## 2024-02-08 PROBLEM — I10 UNCONTROLLED HYPERTENSION: Status: RESOLVED | Noted: 2023-06-20 | Resolved: 2024-02-08

## 2024-02-08 PROBLEM — I10 UNCONTROLLED HYPERTENSION: Status: ACTIVE | Noted: 2023-06-20

## 2024-02-08 PROBLEM — R11.2 NAUSEA & VOMITING: Status: RESOLVED | Noted: 2024-02-07 | Resolved: 2024-02-08

## 2024-02-08 LAB
ALBUMIN SERPL BCP-MCNC: 4.2 G/DL (ref 3.2–4.9)
ALBUMIN/GLOB SERPL: 1.3 G/DL
ALP SERPL-CCNC: 71 U/L (ref 30–99)
ALT SERPL-CCNC: 21 U/L (ref 2–50)
ANION GAP SERPL CALC-SCNC: 13 MMOL/L (ref 7–16)
AST SERPL-CCNC: 23 U/L (ref 12–45)
BILIRUB SERPL-MCNC: 0.4 MG/DL (ref 0.1–1.5)
BUN SERPL-MCNC: 12 MG/DL (ref 8–22)
CALCIUM ALBUM COR SERPL-MCNC: 8.6 MG/DL (ref 8.5–10.5)
CALCIUM SERPL-MCNC: 8.8 MG/DL (ref 8.5–10.5)
CHLORIDE SERPL-SCNC: 105 MMOL/L (ref 96–112)
CHOLEST SERPL-MCNC: 221 MG/DL (ref 100–199)
CO2 SERPL-SCNC: 22 MMOL/L (ref 20–33)
CREAT SERPL-MCNC: 0.72 MG/DL (ref 0.5–1.4)
ERYTHROCYTE [DISTWIDTH] IN BLOOD BY AUTOMATED COUNT: 38.9 FL (ref 35.9–50)
FLUAV RNA SPEC QL NAA+PROBE: NEGATIVE
FLUBV RNA SPEC QL NAA+PROBE: NEGATIVE
GFR SERPLBLD CREATININE-BSD FMLA CKD-EPI: 97 ML/MIN/1.73 M 2
GLOBULIN SER CALC-MCNC: 3.2 G/DL (ref 1.9–3.5)
GLUCOSE BLD STRIP.AUTO-MCNC: 149 MG/DL (ref 65–99)
GLUCOSE SERPL-MCNC: 153 MG/DL (ref 65–99)
HCT VFR BLD AUTO: 43.9 % (ref 37–47)
HDLC SERPL-MCNC: 44 MG/DL
HGB BLD-MCNC: 15 G/DL (ref 12–16)
LDLC SERPL CALC-MCNC: 140 MG/DL
MCH RBC QN AUTO: 29.1 PG (ref 27–33)
MCHC RBC AUTO-ENTMCNC: 34.2 G/DL (ref 32.2–35.5)
MCV RBC AUTO: 85.2 FL (ref 81.4–97.8)
PLATELET # BLD AUTO: 302 K/UL (ref 164–446)
PMV BLD AUTO: 9.2 FL (ref 9–12.9)
POTASSIUM SERPL-SCNC: 3.6 MMOL/L (ref 3.6–5.5)
PROT SERPL-MCNC: 7.4 G/DL (ref 6–8.2)
RBC # BLD AUTO: 5.15 M/UL (ref 4.2–5.4)
RSV RNA SPEC QL NAA+PROBE: NEGATIVE
SARS-COV-2 RNA RESP QL NAA+PROBE: NOTDETECTED
SODIUM SERPL-SCNC: 140 MMOL/L (ref 135–145)
TRIGL SERPL-MCNC: 183 MG/DL (ref 0–149)
WBC # BLD AUTO: 9 K/UL (ref 4.8–10.8)

## 2024-02-08 PROCEDURE — 82962 GLUCOSE BLOOD TEST: CPT

## 2024-02-08 PROCEDURE — 70551 MRI BRAIN STEM W/O DYE: CPT

## 2024-02-08 PROCEDURE — 96375 TX/PRO/DX INJ NEW DRUG ADDON: CPT

## 2024-02-08 PROCEDURE — 99239 HOSP IP/OBS DSCHRG MGMT >30: CPT | Performed by: STUDENT IN AN ORGANIZED HEALTH CARE EDUCATION/TRAINING PROGRAM

## 2024-02-08 PROCEDURE — 80061 LIPID PANEL: CPT

## 2024-02-08 PROCEDURE — 85027 COMPLETE CBC AUTOMATED: CPT

## 2024-02-08 PROCEDURE — 80053 COMPREHEN METABOLIC PANEL: CPT

## 2024-02-08 PROCEDURE — G0378 HOSPITAL OBSERVATION PER HR: HCPCS

## 2024-02-08 PROCEDURE — 700111 HCHG RX REV CODE 636 W/ 250 OVERRIDE (IP): Mod: JZ | Performed by: STUDENT IN AN ORGANIZED HEALTH CARE EDUCATION/TRAINING PROGRAM

## 2024-02-08 PROCEDURE — 97162 PT EVAL MOD COMPLEX 30 MIN: CPT

## 2024-02-08 PROCEDURE — 0241U HCHG SARS-COV-2 COVID-19 NFCT DS RESP RNA 4 TRGT ED POC: CPT

## 2024-02-08 PROCEDURE — A9270 NON-COVERED ITEM OR SERVICE: HCPCS | Performed by: STUDENT IN AN ORGANIZED HEALTH CARE EDUCATION/TRAINING PROGRAM

## 2024-02-08 PROCEDURE — 700102 HCHG RX REV CODE 250 W/ 637 OVERRIDE(OP): Performed by: STUDENT IN AN ORGANIZED HEALTH CARE EDUCATION/TRAINING PROGRAM

## 2024-02-08 PROCEDURE — 97166 OT EVAL MOD COMPLEX 45 MIN: CPT

## 2024-02-08 PROCEDURE — 36415 COLL VENOUS BLD VENIPUNCTURE: CPT

## 2024-02-08 RX ORDER — PROCHLORPERAZINE MALEATE 5 MG/1
5 TABLET ORAL EVERY 6 HOURS PRN
Qty: 30 TABLET | Refills: 0 | Status: SHIPPED | OUTPATIENT
Start: 2024-02-08

## 2024-02-08 RX ORDER — OMEPRAZOLE 40 MG/1
40 CAPSULE, DELAYED RELEASE ORAL DAILY
COMMUNITY
Start: 2023-11-21

## 2024-02-08 RX ORDER — MECLIZINE HCL 12.5 MG/1
12.5 TABLET ORAL 3 TIMES DAILY PRN
Qty: 10 TABLET | Refills: 0 | Status: SHIPPED | OUTPATIENT
Start: 2024-02-08 | End: 2024-02-11

## 2024-02-08 RX ORDER — ONDANSETRON 4 MG/1
4 TABLET, ORALLY DISINTEGRATING ORAL EVERY 4 HOURS PRN
Qty: 30 TABLET | Refills: 0 | Status: SHIPPED | OUTPATIENT
Start: 2024-02-08

## 2024-02-08 RX ORDER — ROSUVASTATIN CALCIUM 5 MG/1
5 TABLET, COATED ORAL DAILY
Status: SHIPPED | COMMUNITY
Start: 2023-08-15 | End: 2024-02-08

## 2024-02-08 RX ORDER — ONDANSETRON 4 MG/1
4 TABLET, ORALLY DISINTEGRATING ORAL EVERY 6 HOURS PRN
Qty: 10 TABLET | Refills: 0 | Status: SHIPPED | OUTPATIENT
Start: 2024-02-08

## 2024-02-08 RX ADMIN — ASPIRIN 81 MG: 81 TABLET, COATED ORAL at 05:34

## 2024-02-08 RX ADMIN — ONDANSETRON 4 MG: 2 INJECTION INTRAMUSCULAR; INTRAVENOUS at 04:37

## 2024-02-08 RX ADMIN — LISINOPRIL 10 MG: 10 TABLET ORAL at 05:34

## 2024-02-08 ASSESSMENT — COGNITIVE AND FUNCTIONAL STATUS - GENERAL
CLIMB 3 TO 5 STEPS WITH RAILING: A LITTLE
SUGGESTED CMS G CODE MODIFIER DAILY ACTIVITY: CH
STANDING UP FROM CHAIR USING ARMS: A LITTLE
MOVING TO AND FROM BED TO CHAIR: A LITTLE
SUGGESTED CMS G CODE MODIFIER MOBILITY: CK
TURNING FROM BACK TO SIDE WHILE IN FLAT BAD: A LITTLE
MOBILITY SCORE: 18
DAILY ACTIVITIY SCORE: 24
MOVING FROM LYING ON BACK TO SITTING ON SIDE OF FLAT BED: A LITTLE
WALKING IN HOSPITAL ROOM: A LITTLE

## 2024-02-08 ASSESSMENT — GAIT ASSESSMENTS
DISTANCE (FEET): 60
GAIT LEVEL OF ASSIST: SUPERVISED

## 2024-02-08 ASSESSMENT — ACTIVITIES OF DAILY LIVING (ADL): TOILETING: INDEPENDENT

## 2024-02-08 NOTE — DISCHARGE PLANNING
Received update from Dr. Groves, PT/OT recommended home health, MD ordered HH and walker. RN CM spoke to pt at bedside. Received HH and DME choice. Choice form faxed to [facility].     Pt confirmed facesheet information. Pt lives in a one story apartment with her spouse. Pt did not use any DMEs at home. Pt has been independent with ADLs and IADLs. Per pt, her sister drives her to her appointments. Pt reports she has a PCP and has insurance through her job. Per pt, her  will provide transportation at discharge.     Discussed with PAR that pt's facesheet was showing insurance earlier but currently no insurance information. Per pt, she has [employer] insurance through her job. Per PAR, she will check. MD, ER RN updated via [employer].    Received [employer]te from ER RN that pt is refusing HH. Per PAR, pt's [employer] insurance termed 1/1/2024, unable to find active insurance. Per PAR, she will give pt FAP resources.    RN CM spoke to pt at bedside. Pt confirmed she does not want home health set up. Pt verbalized understanding. Pt reports she is planning to go back to work when she feels better. Informed pt about insurance issue. Per pt, she will reach out to her employer. Pt denies questions or other needs at this time.

## 2024-02-08 NOTE — ED PROVIDER NOTES
ED Provider Note    CHIEF COMPLAINT  Chief Complaint   Patient presents with    Hypertension     Pt BIB [facility] from work for dizziness and pt states numbness and tingling in lower legs. Pt states last time her blood pressure was high she felt this way. /117, P 80, GCS 15       EXTERNAL RECORDS REVIEWED  Outpatient Notes outpatient records reviewed.  Patient takes 10 mg lisinopril daily for hypertension.    HPI/ROS  LIMITATION TO HISTORY   Select: : None  OUTSIDE HISTORIAN(S):  Family sister at bedside who reports that the patient typically ambulates with a very steady gait.    Rosario Imeldaapellanes Enerio is a 58 y.o. female who presents to the emergency department for evaluation of high blood pressure, dizziness and difficulty walking.  Symptoms started approximately an hour and a half prior to arrival at 730.  Patient reports that initially she felt a sensation of total body numbness followed by room spinning dizziness that was quite severe initially but has improved but not gone away entirely.  She states that when she attempts to walk she feels like she is unsteady on her feet.  She measured her blood pressure and found that it was very high greater than 220 which caused her to feel very anxious.  She otherwise denies any vision changes, nausea or vomiting, headache, chest pain neck pain shortness of breath or additional symptoms.    PAST MEDICAL HISTORY   has a past medical history of Diabetes (HCC), Heart murmur, Hepatitis, acute (6/20/2023), Hypertension, and Palpitations.    SURGICAL HISTORY  patient denies any surgical history    FAMILY HISTORY  Family History   Problem Relation Age of Onset    Heart Disease Mother     Heart Disease Father     Heart Disease Sister         pacemaker    Stroke Sister     Ovarian Cancer Sister     Heart Disease Brother         transplant       SOCIAL HISTORY  Social History     Tobacco Use    Smoking status: Never    Smokeless tobacco: Never   Vaping Use    Vaping Use:  "Never used   Substance and Sexual Activity    Alcohol use: Never    Drug use: Never    Sexual activity: Not on file       CURRENT MEDICATIONS  Home Medications       Reviewed by Yvrose Fischer R.N. (Registered Nurse) on 24 at 2037  Med List Status: Partial     Medication Last Dose Status   lisinopril (PRINIVIL) 10 MG Tab  Active   rosuvastatin (CRESTOR) 5 MG Tab  Active                    ALLERGIES  No Known Allergies    PHYSICAL EXAM  VITAL SIGNS: BP (!) 199/91   Pulse 80   Temp 36.6 °C (97.9 °F) (Temporal)   Resp 16   Ht 1.651 m (5' 5\")   Wt 65.8 kg (145 lb)   SpO2 98%   BMI 24.13 kg/m²    Constitutional: Anxious appearing  HEENT: Atraumatic, normocephalic, pupils are equal round reactive to light, nose normal, mouth shows moist mucous membranes  Neck: Supple, no JVD, no tracheal deviation  Cardiovascular: Regular rate and rhythm, no murmur, rub or gallop, 2+ pulses peripherally x4  Thorax & Lungs: No respiratory distress, no wheezes, rales or rhonchi, no chest wall tenderness.  GI: Soft, non-distended, non-tender, no rebound  Skin: Warm, dry, no acute rash or lesion  Musculoskeletal: Moving all extremities, no acute deformity, no edema, no tenderness  Neurologic: A&Ox3, at baseline mentation, cranial nerves II through XII are grossly intact, 5 out of 5 strength and normal sensation throughout, normal finger-nose-finger and no pronator drift, ataxic and falling to the right  Psychiatric: Anxious appearing          DIAGNOSTIC STUDIES / PROCEDURES  EKG  I have independently interpreted this EKG  Results for orders placed or performed during the hospital encounter of 24   EKG (NOW)   Result Value Ref Range    Report       [facility] Emergency Dept.    Test Date:  2024  Pt Name:    JOSE LUIS SAAVEDRA               Department: ER  MRN:        [id number]                      Room:       RD 07  Gender:     Female                       Technician: [id number]  :        1965       "             Requested By:ER TRIAGE PROTOCOL  Order #:    [id number]                    Reading MD: Anil Woo    Measurements  Intervals                                Axis  Rate:       75                           P:          37  ME:         160                          QRS:        42  QRSD:       130                          T:          25  QT:         426  QTc:        476    Interpretive Statements  Sinus rhythm  Right bundle branch block  Compared to ECG 06/21/2023 00:51:51  No significant changes  Electronically Signed On 02- 21:05:34 PST by Anil Woo           LABS  Labs Reviewed   COMP METABOLIC PANEL - Abnormal; Notable for the following components:       Result Value    Glucose 117 (*)     All other components within normal limits    Narrative:     Biotin intake of greater than 5 mg per day may interfere with  troponin levels, causing false low values.  Indicate which anticoagulants the patient is on:->UNKNOWN   HEMOGLOBIN A1C - Abnormal; Notable for the following components:    Glycohemoglobin 6.1 (*)     All other components within normal limits   POCT GLUCOSE DEVICE RESULTS - Abnormal; Notable for the following components:    POC Glucose, Blood 125 (*)     All other components within normal limits   CBC WITH DIFFERENTIAL    Narrative:     Biotin intake of greater than 5 mg per day may interfere with  troponin levels, causing false low values.  Indicate which anticoagulants the patient is on:->UNKNOWN   TROPONIN    Narrative:     Biotin intake of greater than 5 mg per day may interfere with  troponin levels, causing false low values.  Indicate which anticoagulants the patient is on:->UNKNOWN   COD (ADULT)   APTT    Narrative:     Biotin intake of greater than 5 mg per day may interfere with  troponin levels, causing false low values.  Indicate which anticoagulants the patient is on:->UNKNOWN   PROTHROMBIN TIME    Narrative:     Biotin intake of greater than 5 mg per day may interfere  with  troponin levels, causing false low values.  Indicate which anticoagulants the patient is on:->UNKNOWN   ABO RH CONFIRM   ESTIMATED GFR    Narrative:     Biotin intake of greater than 5 mg per day may interfere with  troponin levels, causing false low values.  Indicate which anticoagulants the patient is on:->UNKNOWN   MAGNESIUM   CBC WITHOUT DIFFERENTIAL   COMP METABOLIC PANEL   LIPID PROFILE         RADIOLOGY  I have independently interpreted the diagnostic imaging associated with this visit and am waiting the final reading from the radiologist.   My preliminary interpretation is as follows: CT head with no intracranial hemorrhage.  CT angiogram with no LVO.    Radiologist interpretation:   CT-CTA NECK WITH & W/O-POST PROCESSING   Final Result         1.  CT angiogram of the neck within normal limits.   2.  Expansile lytic lesion in the right mandible near the mandibular angle abutting the molar tooth roots. Could represent odontogenic cyst or mass. Recommend dental referral for further workup and management.   3.  Slight hazy groundglass pulmonary opacities, consider component of the edema or atypical infiltrates.         CT-CTA HEAD WITH & W/O-POST PROCESS   Final Result         1.  No large vessel occlusion or aneurysm identified      CT-CEREBRAL PERFUSION ANALYSIS   Final Result         1.  Cerebral blood flow less than 30% likely representing completed infarct = 0 mL.      2.  T Max more than 6 seconds likely representing combination of completed infarct and ischemia = 0 mL.      3.  Mismatched volume likely representing ischemic brain/penumbra = None      4.  Please note that the cerebral perfusion was performed on the limited brain tissue around the basal ganglia region. Infarct/ischemia outside the CT perfusion sections can be missed in this study.      CT-HEAD W/O   Final Result         1.  No acute intracranial abnormality.         MR-BRAIN-W/O    (Results Pending)   EC-ECHOCARDIOGRAM COMPLETE W/O  CONT    (Results Pending)         COURSE & MEDICAL DECISION MAKING    ED Observation Status? No; Patient does not meet criteria for ED Observation.     INITIAL ASSESSMENT, COURSE AND PLAN  Care Narrative:     9:10 PM patient initially evaluated by me with complaints of room spinning vertigo and difficulty walking that started suddenly at 7:30 PM this evening.  Patient presents significantly hypertensive and has a known history of hypertension, diabetes.  No prior history of stroke.  Ataxic on exam.  NIH of 2.  Stroke alerted given acute onset of symptoms and ataxia.  Lab work obtained, CT head, CTA head and neck and CT perfusion study ordered.  10 mg of IV labetalol administered for hypertension.  EKG reviewed by me and demonstrates a right bundle branch block otherwise no change from prior.  Patient not endorsing any chest pain, neck pain, back pain to suggest dissection however will assess on CT angiogram.    9:50 PM Case discussed with Dr. Marquez neurologist at bedside.  We reviewed imaging which does not demonstrate any intracranial hemorrhage, LVO and CT perfusion study is normal.  At the time of this reevaluation the patient's symptoms have improved with no ataxia of the limbs.  Risks and benefits of thrombolysis discussed with the patient as well as our feeling that given resolved symptoms risks far out seen potential benefit and patient is in agreement.  Thrombolysis was thus withheld.  Neurology recommending admission for MRI and further stroke evaluation.  Discussed this with the patient including my plan to obtain basic labs to screen for an alternative explanation of her symptoms.  She is comfortable with this.  Blood pressure has improved markedly after labetalol administration.    Laboratory workup largely unremarkable with no alternative explanation of the patient's symptoms.  She has no significant electrolyte abnormality or evidence of infection.  EKG is nonischemic and troponin is  negative.    10:30 PM Case discussed with the admitting hospitalist Dr. Yap accepts the patient for admission    ADDITIONAL PROBLEM LIST  Hypertension    DISPOSITION AND DISCUSSIONS  I have discussed management of the patient with the following physicians and JOE's: Dr. Marquez neurology and Dr. Yap, hospitalist    Discussion of management with other hospitals or appropriate source(s): None     Decision tools and prescription drugs considered including, but not limited to: NIH Stroke Scale 2 .    FINAL DIAGNOSIS  1. Vertigo    2. Ataxia    3. Primary hypertension           Electronically signed by: Anil Woo M.D., 2/7/2024 9:04 PM

## 2024-02-08 NOTE — DISCHARGE SUMMARY
Discharge Summary    CHIEF COMPLAINT ON ADMISSION  Chief Complaint   Patient presents with    Hypertension     Pt BIB [facility] from work for dizziness and pt states numbness and tingling in lower legs. Pt states last time her blood pressure was high she felt this way. /117, P 80, GCS 15       Reason for Admission  Vertigo    Admission Date  2/7/2024    CODE STATUS  Full Code    HPI & HOSPITAL COURSE  This is a 58 y.o. female with prediabetes and HTN here with dizziness and weakness.    She presented with abrupt whole-body paresthesia and presyncope. She felt unsteady and reported vertigo at rest. Her symptoms resolved in the ED. CTA H&N was without occlusion. CTH was negative for acute abnormality. Neurology was consulted for stroke alert and deemed thrombolytics not indicated. MRI was negative for acute abnormality for which she was diagnosed with peripheral vertigo. Due to severe symptoms with movement she was treated with meclizine, which improved her symptoms. CBC and CMP were normal other than slight hyperglycemia. Respiratory PCR was negative for flu, covid, influenza.  She was evaluated by PT/OT and recommended for HH but she declined due to copay. She was provided a FWW and observed to ambulate independently at discharge.    Therefore, she is discharged in fair and stable condition to home with close outpatient follow-up.    The patient recovered much more quickly than anticipated on admission.    Discharge Date  2/8/2024    FOLLOW UP ITEMS POST DISCHARGE    Vertigo - brief prescription of meclizine due to anticholinergic side-effects    DISCHARGE DIAGNOSES  Principal Problem:    Peripheral vertigo of both ears (POA: Yes)  Active Problems:    Prediabetes (POA: Yes)  Resolved Problems:    Uncontrolled hypertension (POA: Yes)    Nausea & vomiting (POA: Yes)      FOLLOW UP  No future appointments.  Hien Frey, P.A.-[place]  [address] [id number] [phone]    Schedule an appointment as  soon as possible for a visit in 2 week(s)  As needed after hospital      MEDICATIONS ON DISCHARGE     Medication List        START taking these medications        Instructions   meclizine 12.5 MG Tabs  Commonly known as: Antivert   Take 1 Tablet by mouth 3 times a day as needed for Vertigo or Dizziness for up to 3 days.  Dose: 12.5 mg     * ondansetron 4 MG Tbdp  Commonly known as: Zofran ODT   Take 1 Tablet by mouth every 6 hours as needed for Nausea/Vomiting.  Dose: 4 mg     * ondansetron 4 MG Tbdp  Commonly known as: Zofran ODT   Take 1 Tablet by mouth every four hours as needed for Nausea/Vomiting.  Dose: 4 mg     prochlorperazine 5 MG Tabs  Commonly known as: Compazine   Take 1 Tablet by mouth every 6 hours as needed for Nausea/Vomiting (if ondansetron not effective).  Dose: 5 mg           * This list has 2 medication(s) that are the same as other medications prescribed for you. Read the directions carefully, and ask your doctor or other care provider to review them with you.                CONTINUE taking these medications        Instructions   lisinopril 10 MG Tabs  Commonly known as: Prinivil   TAKE 1 TABLET BY MOUTH EVERY DAY  Dose: 10 mg     rosuvastatin 5 MG Tabs  Commonly known as: Crestor   Take 1 Tablet by mouth every evening.  Dose: 5 mg              Allergies  No Known Allergies    DIET  Orders Placed This Encounter   Procedures    Diet Order Diet: Consistent CHO (Diabetic)     Standing Status:   Standing     Number of Occurrences:   1     Order Specific Question:   Diet:     Answer:   Consistent CHO (Diabetic) [4]       ACTIVITY  As tolerated.  Weight bearing as tolerated    CONSULTATIONS  Neurology    PROCEDURES  None    LABORATORY  Lab Results   Component Value Date    SODIUM 140 02/08/2024    POTASSIUM 3.6 02/08/2024    CHLORIDE 105 02/08/2024    CO2 22 02/08/2024    GLUCOSE 153 (H) 02/08/2024    BUN 12 02/08/2024    CREATININE 0.72 02/08/2024        Lab Results   Component Value Date    WBC 9.0  02/08/2024    HEMOGLOBIN 15.0 02/08/2024    HEMATOCRIT 43.9 02/08/2024    PLATELETCT 302 02/08/2024        Total time of the discharge process exceeds 39 minutes.

## 2024-02-08 NOTE — ASSESSMENT & PLAN NOTE
Patient presents with 2-hour long history of vertigo-like symptoms, including dizziness and room spinning.  States that it is associated with headache along with nausea and vomiting.  Symptoms have since resolved.  CTA head and neck unremarkable.  CT head negative.  CT perfusion study negative.  Follow-up MRI brain to rule out stroke  Meclizine prn ordered    Neurology consulted by ERP. Appreciate recommendations:   Every 4 hours neurochecks  Systolic blood pressure goal is normotension, with systolic between 100-130/60 8/80.  Follow-up MRI brain without contrast  Telemetry monitoring  Follow-up TTE with bubble study  ASA 81 mg PO q day and Atorvastatin 80 mg PO q HS. Check lipid panel.  Goal LDL less than 70  BG management - follow up A1c  PT/OT  SCDs

## 2024-02-08 NOTE — DISCHARGE INSTRUCTIONS
Discharge Instructions per Tono Groves M.D.    You were hospitalized for vertigo which was NOT due to a stroke. This is benign and will resolve soon on its own. You can take meclizine for a few days to adjust to the vertigo, but if any side effects please discontinue it.    DIET: Regular    ACTIVITY: Regular    DIAGNOSIS: Peripheral Vertigo    Return to ER if you faint for any reason, develop chest pain or shortness of breath, or become unable to keep down food or drink despite taking nausea medicine.

## 2024-02-08 NOTE — ED NOTES
Bedside report received from off going RN/tech: Drew, assumed care of patient.  POC discussed with patient. Call light within reach, all needs addressed at this time.       Fall risk interventions in place: Move the patient closer to the nurse's station, Patient's personal possessions are with in their safe reach, Place fall risk sign on patient's door, Keep floor surfaces clean and dry, and Accompanied to restroom (all applicable per [place] Fall risk assessment)   Continuous monitoring: Cardiac Leads, Pulse Ox, or Blood Pressure

## 2024-02-08 NOTE — ED NOTES
Dr. Yuan Laguna Unable to complete med rec. Pt is unable to verify which medications she takes. Pt states she takes 4 total but doesn't know the names. Pharmacy is closed at this time

## 2024-02-08 NOTE — DISCHARGE PLANNING
Referral sent per choice to [facility]    [address]- [facility]    1203- Spoke To: Shahida  Agency/Facility Name: [facility]  Plan or Request: declined / lópez has termed

## 2024-02-08 NOTE — H&P
Neurology STROKE H&P  Neurohospitalist Service, [facility]    Referring Physician: Anil Woo M.D.    STROKE:   Chief Complaint   Patient presents with    Hypertension     Pt BIB [facility] from work for dizziness and pt states numbness and tingling in lower legs. Pt states last time her blood pressure was high she felt this way. /117, P 80, GCS 15       To obtain the most accurate data regarding the time called, and time patient seen, refer to the stroke run-sheet and chart.  For time of CT, refer to the radiology report. See A&P below for TPA Decision and door to needle time if and when applicable.    HPI: Rosario Imeldaapellanes Enerio is a 58 y.o. right-handed female with past medical history significant for hypertension, prediabetes who was brought to emergency room for evaluation of acute onset of dizziness and unsteadiness associated with tingling of both lower extremity.  En route to emergency room she had some nausea and vomiting.  She also reported transient double vision associated with her symptoms.  She underwent brain CT followed by CT angiogram of the head and neck and CT perfusion which were all unremarkable.    Review of systems: In addition to what is detailed in the HPI above, all other systems reviewed and are negative.    Past Medical History:    has a past medical history of Diabetes (HCC), Heart murmur, Hepatitis, acute (6/20/2023), Hypertension, and Palpitations.    FHx:  family history includes Heart Disease in her brother, father, mother, and sister; Ovarian Cancer in her sister; Stroke in her sister.    SHx:   reports that she has never smoked. She has never used smokeless tobacco. She reports that she does not drink alcohol and does not use drugs.    Allergies:  No Known Allergies    Medications:  No current facility-administered medications for this encounter.    Current Outpatient Medications:     lisinopril (PRINIVIL) 10 MG Tab, TAKE 1 TABLET BY MOUTH  EVERY DAY, Disp: 30 Tablet, Rfl: 2    rosuvastatin (CRESTOR) 5 MG Tab, Take 1 Tablet by mouth every evening., Disp: 90 Tablet, Rfl: 3    Physical Examination:    Vitals:    02/07/24 2120 02/07/24 2136 02/07/24 2145 02/07/24 2214   BP: (!) 197/91 (!) 186/80 (!) 148/78 (!) 177/77   Pulse: 88 78  68   Resp: 20 18  17   Temp:       TempSrc:       SpO2:  96%  94%   Weight:       Height:           General:   Patient is awake and in no acute distress  Neck: Full range of motion  Eyes: Midline, Pupils reactive to light.  CV: RRR  Lungs: No respiratory distress  Extremities: No cyanosis, warm, no significant edema.    NEUROLOGICAL EXAM:   Mental status: Awake, alert and fully oriented, follows commands  Speech and language: speech is fluent and not dysarthric. The patient is able to name and repeat.  Cranial nerve exam: Pupils are equal, round and reactive to light bilaterally. Visual fields are full. Extraocular muscles are intact, there is no nystagmus. Sensation in the face is intact to light touch. Face is symmetric. Hearing to finger rub equal. Palate elevates symmetrically. Shoulder shrug is full. Tongue is midline.  Motor exam: Sustain antigravity in all 4 extremities with no downward drift. Tone is normal. No abnormal movements were seen on exam.  Sensory exam: No sensory deficits identified   Coordination: no gross ataxia noted on exam  Plantar reflexes: Equivocal  Gait: deferred    NIH Stroke Scale:    1a. Level of Consciousness (Alert, drowsy, etc): 0= Alert    1b. LOC Questions (Month, age): 0= Answers both correctly    1c. LOC Commands (Open/close eyes make fist/let go): 0= Obeys both correctly    2.   Best Gaze (Eyes open - patient follows examiner's finger on face): 0= Normal    3.   Visual Fields (introduce visual stimulus/threat to patient's field quadrants): 0= No visual loss  4.   Facial Paresis (Show teeth, raise eyebrows and squeeze eyes shut): 0= Normal     5a. Motor Arm - Left (Elevate arm to 90  degrees if patient is sitting, 45 degrees if  supine): 0= No drift    5b. Motor Arm - Right (Elevate arm to 90 degrees if patient is sitting, 45 degrees if supine): 0= No drift    6a. Motor Leg - Left (Elevate leg 30 degrees with patient supine): 0= No drift    6b. Motor Leg - Right  (Elevate leg 30 degrees with patient supine): 0= No drift    7.   Limb Ataxia (Finger-nose, heel down shin): 0= No ataxia    8.   Sensory (Pin prick to face, arm, trunk and leg - compare side to side): 0= Normal    9.  Best Language (Name item, describe a picture and read sentences): 0= No aphasia    10. Dysarthria (Evaluate speech clarity by patient repeating listed words): 0= Normal articulation    11. Extinction and Inattention (Use information from prior testing to identify neglect or  double simultaneous stimuli testing): 0= No neglect    Total NIH Score: 0    Baseline modified [place] Scale (MRS): 0 = No symptoms    Objective Data:    Labs:  Lab Results   Component Value Date/Time    PROTHROMBTM 13.3 02/07/2024 09:25 PM    INR 1.00 02/07/2024 09:25 PM      Lab Results   Component Value Date/Time    WBC 6.7 02/07/2024 09:25 PM    RBC 5.16 02/07/2024 09:25 PM    HEMOGLOBIN 15.3 02/07/2024 09:25 PM    HEMATOCRIT 45.0 02/07/2024 09:25 PM    MCV 87.2 02/07/2024 09:25 PM    MCH 29.7 02/07/2024 09:25 PM    MCHC 34.0 02/07/2024 09:25 PM    MPV 9.2 02/07/2024 09:25 PM    NEUTSPOLYS 61.30 02/07/2024 09:25 PM    LYMPHOCYTES 31.00 02/07/2024 09:25 PM    MONOCYTES 6.00 02/07/2024 09:25 PM    EOSINOPHILS 1.20 02/07/2024 09:25 PM    BASOPHILS 0.40 02/07/2024 09:25 PM      Lab Results   Component Value Date/Time    SODIUM 140 02/07/2024 09:25 PM    POTASSIUM 3.8 02/07/2024 09:25 PM    CHLORIDE 105 02/07/2024 09:25 PM    CO2 21 02/07/2024 09:25 PM    GLUCOSE 117 (H) 02/07/2024 09:25 PM    BUN 15 02/07/2024 09:25 PM    CREATININE 0.88 02/07/2024 09:25 PM    BUNCREATRAT 12.2 01/29/2024 06:36 AM      Lab Results   Component Value Date/Time     CHOLSTRLTOT 158 06/21/2023 12:41 AM    LDL 88 06/21/2023 12:41 AM    HDL 42 06/21/2023 12:41 AM    TRIGLYCERIDE 141 06/21/2023 12:41 AM       Lab Results   Component Value Date/Time    ALKPHOSPHAT 73 02/07/2024 09:25 PM    ASTSGOT 29 02/07/2024 09:25 PM    ALTSGPT 21 02/07/2024 09:25 PM    TBILIRUBIN 0.3 02/07/2024 09:25 PM        Imaging/Testing:    I interpreted and/or reviewed the patient's neuroimaging    CT-CTA NECK WITH & W/O-POST PROCESSING   Final Result         1.  CT angiogram of the neck within normal limits.   2.  Expansile lytic lesion in the right mandible near the mandibular angle abutting the molar tooth roots. Could represent odontogenic cyst or mass. Recommend dental referral for further workup and management.   3.  Slight hazy groundglass pulmonary opacities, consider component of the edema or atypical infiltrates.         CT-CTA HEAD WITH & W/O-POST PROCESS   Final Result         1.  No large vessel occlusion or aneurysm identified      CT-CEREBRAL PERFUSION ANALYSIS   Final Result         1.  Cerebral blood flow less than 30% likely representing completed infarct = 0 mL.      2.  T Max more than 6 seconds likely representing combination of completed infarct and ischemia = 0 mL.      3.  Mismatched volume likely representing ischemic brain/penumbra = None      4.  Please note that the cerebral perfusion was performed on the limited brain tissue around the basal ganglia region. Infarct/ischemia outside the CT perfusion sections can be missed in this study.      CT-HEAD W/O   Final Result         1.  No acute intracranial abnormality.             Assessment:  Rosario Imeldaapellanes Enerio is a 58 y.o.   right-handed female with history of hypertension and prediabetes who was brought to emergency room for evaluation of dizziness and unsteadiness with transient episode of double vision.  She was noted to have high blood pressure 220/117.  Her exam is completely benign and unremarkable with NIH scale  score of 0 and no nystagmus.  Although stroke is not totally excluded, however given she is asymptomatic after discussion with the patient and family they have decided against administration of thrombolytic therapy.  There is no large vessel occlusion to warrant thrombectomy.  Vestibulopathy is in the differential as well.    Plan:  -q4h and PRN neuro assessment. VS per nursing/unit protocol.   -BP goal is normotension, 100-130/60-80. Antihypertensives per primary team.   -Obtain MRI Brain wo contrast.   -Telemetry; currently SR. Screen for Afib/arrhythmia. Obtain TTE with bubble study.   -ASA 81 mg PO q day and Atorvastatin 80 mg PO q HS. Check lipid panel.  Goal LDL less than 70  -Recommend aggressive BG management per primary team. Avoid IVF with Dextrose. -BG goal . Check hemoglobin A1c.  Goal < 7  -PT/OT/SLP eval and treat for early mobilization.  -All other medical management per primary team.   -DVT PPX: SCDs.      The plan of care above has been discussed with Anil Woo M.D.    Upon my evaluation, this patient had a high probability of imminent or life-threatening deterioration due to cerebrovascular accident which required my direct attention, intervention, and personal management.  I personally provided 55 minutes of total critical care time outside of time spent on separately billable/documented procedures. Time includes: review of laboratory data, review of radiology studies, discussion with consultants, discussion with family/patient, monitoring for potential decompensation.  Interventions were performed as documented in the chart.      Please note that this dictation was created using voice recognition software. I have made every reasonable attempt to correct obvious errors, but I expect that there are errors of grammar and possibly content that I did not discover before finalizing the note.       Silver Marquez MD  [facility]

## 2024-02-08 NOTE — THERAPY
Occupational Therapy   Initial Evaluation     Patient Name: Rosario Imeldaapellanes Enerio  Age:  58 y.o., Sex:  female  Medical Record #: [id number]  Today's Date: 2/8/2024     Precautions  Precautions: (P) Fall Risk    Assessment    Patient is 58 y.o. female admitted for HTN, dizziness, numbness/tingling in BLEs. Pt normally independent with functional mobility and ADLs living in a GL apartment living with spouse who works daily per patient. Pt seen post-PT evaluation, pt able to complete all functional mobility and ADLs with supervision with use of FWW which patient stated helped her feel more steady. Would recommend home health at discharge otherwise no acute OT needs identified.      Plan    DC Equipment Recommendations: (P) None  Discharge Recommendations: (P) Recommend home health for continued occupational therapy services     Objective       02/08/24 1048   Prior Living Situation   Prior Services None   Housing / Facility 1 Story Apartment / Condo   Steps Into Home 4   Bathroom Set up Bathtub / Shower Combination   Equipment Owned None   Lives with - Patient's Self Care Capacity Spouse   Comments Spouse works daily   Prior Level of ADL Function   Self Feeding Independent   Grooming / Hygiene Independent   Bathing Independent   Dressing Independent   Toileting Independent   Prior Level of IADL Function   Medication Management Independent   Laundry Independent   Kitchen Mobility Independent   Finances Independent   Home Management Independent   Shopping Independent   Prior Level Of Mobility Independent Without Device in Community   Driving / Transportation Driving Independent   Occupation (Pre-Hospital Vocational) Employed Full Time   History of Falls   History of Falls No   Precautions   Precautions Fall Risk   Cognition    Cognition / Consciousness WDL   Level of Consciousness Alert   Active ROM Upper Body   Active ROM Upper Body  WDL   Dominant Hand Right   Strength Upper Body   Upper Body Strength  WDL    Sensation Upper Body   Upper Extremity Sensation  WDL   Upper Body Muscle Tone   Upper Body Muscle Tone  WDL   Neurological Concerns   Neurological Concerns No   Coordination Upper Body   Coordination WDL   Balance Assessment   Sitting Balance (Static) Good   Sitting Balance (Dynamic) Good   Standing Balance (Static) Good   Standing Balance (Dynamic) Good   Weight Shift Sitting Good   Weight Shift Standing Good   Comments w/ FWW   Bed Mobility    Sit to Supine Supervised   Scooting Supervised   Rolling Supervised   ADL Assessment   Eating Supervision   Grooming Supervision;Standing   Upper Body Dressing Supervision   Lower Body Dressing Supervision   Toileting Supervision   How much help from another person does the patient currently need...   Putting on and taking off regular lower body clothing? 4   Bathing (including washing, rinsing, and drying)? 4   Toileting, which includes using a toilet, bedpan, or urinal? 4   Putting on and taking off regular upper body clothing? 4   Taking care of personal grooming such as brushing teeth? 4   Eating meals? 4   6 Clicks Daily Activity Score 24   Functional Mobility   Sit to Stand Supervised   Bed, Chair, Wheelchair Transfer Supervised   Toilet Transfers Supervised   Transfer Method Stand Step   Mobility walked in hallway to bathroom, left seated at EOB for breakfast   Comments w/ FWW   Activity Tolerance   Sitting Edge of Bed left seated EOB for breakfast   Standing 10 min   Education Group   Education Provided Role of Occupational Therapist   Role of Occupational Therapist Patient Response Patient;Acceptance;Explanation   Problem List   Problem List None   Anticipated Discharge Equipment and Recommendations   DC Equipment Recommendations None   Discharge Recommendations Recommend home health for continued occupational therapy services   Interdisciplinary Plan of Care Collaboration   IDT Collaboration with  Nursing;Physical Therapist   Patient Position at End of Therapy  Seated;Edge of Bed;Call Light within Reach;Tray Table within Reach;Phone within Reach  (KATIE pitt)   Collaboration Comments RN updated

## 2024-02-08 NOTE — ED TRIAGE NOTES
Rosario Imeldaapellanes Enerio   58 y.o.     Chief Complaint   Patient presents with    Hypertension     Pt BIB [facility] from work for dizziness and pt states numbness and tingling in lower legs. Pt states last time her blood pressure was high she felt this way. /117, P 80, GCS 15       A&O x 4, EMS tried to give pt zofran orally pt threw it up. Pt has hx of HTN, DMT2        Vitals:    02/07/24 2038   BP: (!) 199/91   Pulse: 80   Resp: 16   Temp: 36.6 °C (97.9 °F)   SpO2: 98%

## 2024-02-08 NOTE — ED NOTES
Patient resting comfortably, resp even and unlabored, NAD, and no needs at this time.  Fall safety precautions in place per policy.

## 2024-02-08 NOTE — THERAPY
Physical Therapy   Initial Evaluation     Patient Name: Rosario Imeldaapellanes Enerio  Age:  58 y.o., Sex:  female  Medical Record #: [id number]  Today's Date: 2/8/2024     Precautions  Precautions: Fall Risk    Assessment  Patient is 58 y.o. female admitted with hypertensive urgency, dizziness, numbness & tingling in BLE.  All imaging revealed no acute changes.  Pt normally independent with all functional mobility, lives in an apartment with her .  Pt was seen for PT evaluation, demonstrated all functional mobility with SPV as detailed below.  Trialled gait with SPC and FWW, noted difficulty sequencing SPC & pt more comfortable with FWW.  Pt c/o unchanging dizziness throughout session, no nystagmus noted with oculomotor testing.  Recommend home health PT.    Plan    Physical Therapy Initial Treatment Plan   Duration: Evaluation only    DC Equipment Recommendations: Front-Wheel Walker  Discharge Recommendations: Recommend home health for continued physical therapy services     Objective     02/08/24 1032   Precautions   Precautions Fall Risk   Vitals   Vitals Comments BP 110s-120s/50s with position changes   Prior Living Situation   Prior Services Home-Independent   Housing / Facility 1 Story Apartment / Condo   Steps Into Home 4   Rail None   Equipment Owned None   Lives with - Patient's Self Care Capacity Spouse   Comments Pt & spouse both work full time   Prior Level of Functional Mobility   Bed Mobility Independent   Transfer Status Independent   Ambulation Independent   Ambulation Distance community distances   Assistive Devices Used None   Stairs Independent   Cognition    Cognition / Consciousness WDL   Level of Consciousness Alert   Comments Pleasant & receptive   Active ROM Lower Body    Active ROM Lower Body  WDL   Strength Lower Body   Lower Body Strength  WDL   Sensation Lower Body   Lower Extremity Sensation   WDL   Vision   Vision Comments No nystagmus noted with smooth pursuit, saccades, head  turns, and head thrust test. C/o steady dizziness throughout, nothing relieving or exacerbating.   Balance Assessment   Sitting Balance (Static) Fair   Sitting Balance (Dynamic) Fair   Standing Balance (Static) Fair   Standing Balance (Dynamic) Fair   Weight Shift Sitting Fair   Weight Shift Standing Fair   Bed Mobility    Supine to Sit Supervised   Sit to Supine Supervised   Scooting Supervised   Gait Analysis   Gait Level Of Assist Supervised   Assistive Device (trialled SPC and FWW, pt more comfortable with FWW, better sequencing)   Distance (Feet) 60   # of Times Distance was Traveled 2   Deviation (slow pace)   Weight Bearing Status No restrictions   Functional Mobility   Sit to Stand Supervised   Bed, Chair, Wheelchair Transfer Supervised   Mobility bed mob, ambulation   Activity Tolerance   Comments functional   Education Group   Education Provided Role of Physical Therapist;Use of Assistive Device   Role of Physical Therapist Patient Response Patient;Acceptance;Explanation;Verbal Demonstration   Use of Assistive Device Patient Response Patient;Acceptance;Explanation;Verbal Demonstration   Physical Therapy Initial Treatment Plan    Duration Evaluation only

## 2024-02-08 NOTE — ED NOTES
Pt was given FWW by traction and verbalized understanding of use. Pt given dc instructions and given opportunity for questions. Pt requesting a work note to be able to return to work on Monday. Pt ambulatory to lobby using walker w/o difficulty.  driving her home

## 2024-02-08 NOTE — H&P
Hospital Medicine History & Physical Note    Date of Service  2/7/2024    Primary Care Physician  Hien Frey P.A.-C.    Consultants  neurology    Specialist Names: Dr. Marquez     Code Status  Full Code    Chief Complaint  Chief Complaint   Patient presents with    Hypertension     Pt BIB [facility] from work for dizziness and pt states numbness and tingling in lower legs. Pt states last time her blood pressure was high she felt this way. /117, P 80, GCS 15       History of Presenting Illness  Rosario Imeldaapellanes Enerio is a 58 y.o. female with past medical history of hypertension diabetes who presented 2/7/2024 with generalized weakness, numbness and dizziness..  Patient states that prior to arrival, she was work at approximate 7:30 PM.  She states that she was standing all day, and all of a sudden she felt like her body became numb.  She states that she almost passed out.  She felt like she was unsteady with her gait, and required some help with ambulation.  She states that she was dizzy for about 2 hours, and states that the room was spinning as well.  While in the emergency department, patient states that she feels like she is at her baseline without any symptoms.  She had extensive imaging done including CTA head and neck, CT head and CT perfusion studies which were unremarkable.  NIHSS 0.  Neurology was consulted, and was decided against administration of thrombolytic therapy.  Patient will be admitted for further workup.      I discussed the plan of care with patient.ERP and neurology on call     Review of Systems  Review of Systems   Constitutional:  Negative for chills and fever.   HENT:  Negative for congestion, ear discharge, ear pain, nosebleeds and sinus pain.    Respiratory:  Negative for hemoptysis, sputum production and shortness of breath.    Cardiovascular:  Negative for chest pain, palpitations and orthopnea.   Gastrointestinal:  Positive for nausea and vomiting. Negative for abdominal  pain and diarrhea.   Genitourinary:  Negative for frequency, hematuria and urgency.   Musculoskeletal:  Negative for back pain and neck pain.   Neurological:  Positive for dizziness, sensory change and headaches. Negative for tingling, tremors, speech change and focal weakness.   Psychiatric/Behavioral:  Negative for hallucinations, substance abuse and suicidal ideas. The patient is not nervous/anxious and does not have insomnia.        Past Medical History   has a past medical history of Diabetes (HCC), Heart murmur, Hepatitis, acute (6/20/2023), Hypertension, and Palpitations.    Surgical History   has no past surgical history on file.     Family History  family history includes Heart Disease in her brother, father, mother, and sister; Ovarian Cancer in her sister; Stroke in her sister.   Family history reviewed with patient. There is no family history that is pertinent to the chief complaint.     Social History   reports that she has never smoked. She has never used smokeless tobacco. She reports that she does not drink alcohol and does not use drugs.    Allergies  No Known Allergies    Medications  Prior to Admission Medications   Prescriptions Last Dose Informant Patient Reported? Taking?   lisinopril (PRINIVIL) 10 MG Tab   No No   Sig: TAKE 1 TABLET BY MOUTH EVERY DAY   rosuvastatin (CRESTOR) 5 MG Tab   No No   Sig: Take 1 Tablet by mouth every evening.      Facility-Administered Medications: None       Physical Exam  Temp:  [36.6 °C (97.9 °F)] 36.6 °C (97.9 °F)  Pulse:  [68-88] 68  Resp:  [16-21] 17  BP: (148-200)/(77-92) 177/77  SpO2:  [94 %-99 %] 94 %  Blood Pressure: (!) 177/77   Temperature: 36.6 °C (97.9 °F)   Pulse: 68   Respiration: 17   Pulse Oximetry: 94 %       Physical Exam  Constitutional:       General: She is not in acute distress.     Appearance: Normal appearance. She is normal weight. She is not ill-appearing, toxic-appearing or diaphoretic.   HENT:      Head: Normocephalic and atraumatic.       Nose: Nose normal.      Mouth/Throat:      Mouth: Mucous membranes are moist.   Eyes:      Extraocular Movements: Extraocular movements intact.      Pupils: Pupils are equal, round, and reactive to light.   Cardiovascular:      Rate and Rhythm: Normal rate and regular rhythm.      Pulses: Normal pulses.      Heart sounds: Normal heart sounds. No murmur heard.     No friction rub. No gallop.   Pulmonary:      Effort: Pulmonary effort is normal. No respiratory distress.      Breath sounds: No stridor. No wheezing, rhonchi or rales.   Chest:      Chest wall: No tenderness.   Abdominal:      General: Abdomen is flat. There is no distension.      Palpations: Abdomen is soft. There is no mass.      Tenderness: There is no abdominal tenderness. There is no guarding or rebound.      Hernia: No hernia is present.   Musculoskeletal:         General: No swelling, tenderness, deformity or signs of injury.      Right lower leg: No edema.      Left lower leg: No edema.      Comments:      Skin:     General: Skin is warm and dry.      Capillary Refill: Capillary refill takes less than 2 seconds.      Coloration: Skin is not jaundiced or pale.      Findings: No bruising, erythema, lesion or rash.   Neurological:      General: No focal deficit present.      Mental Status: She is alert and oriented to person, place, and time. Mental status is at baseline.      Cranial Nerves: No cranial nerve deficit.      Sensory: No sensory deficit.      Motor: No weakness.      Coordination: Coordination normal.   Psychiatric:         Mood and Affect: Mood normal.         Behavior: Behavior normal.         Laboratory:  Recent Labs     02/07/24 2125   WBC 6.7   RBC 5.16   HEMOGLOBIN 15.3   HEMATOCRIT 45.0   MCV 87.2   MCH 29.7   MCHC 34.0   RDW 41.1   PLATELETCT 268   MPV 9.2     Recent Labs     02/07/24 2125   SODIUM 140   POTASSIUM 3.8   CHLORIDE 105   CO2 21   GLUCOSE 117*   BUN 15   CREATININE 0.88   CALCIUM 9.1     Recent Labs      "02/07/24 2125   ALTSGPT 21   ASTSGOT 29   ALKPHOSPHAT 73   TBILIRUBIN 0.3   GLUCOSE 117*     Recent Labs     02/07/24 2125   APTT 32.0   INR 1.00     No results for input(s): \"NTPROBNP\" in the last 72 hours.      Recent Labs     02/07/24 2125   TROPONINT 7       Imaging:  CT-CTA NECK WITH & W/O-POST PROCESSING   Final Result         1.  CT angiogram of the neck within normal limits.   2.  Expansile lytic lesion in the right mandible near the mandibular angle abutting the molar tooth roots. Could represent odontogenic cyst or mass. Recommend dental referral for further workup and management.   3.  Slight hazy groundglass pulmonary opacities, consider component of the edema or atypical infiltrates.         CT-CTA HEAD WITH & W/O-POST PROCESS   Final Result         1.  No large vessel occlusion or aneurysm identified      CT-CEREBRAL PERFUSION ANALYSIS   Final Result         1.  Cerebral blood flow less than 30% likely representing completed infarct = 0 mL.      2.  T Max more than 6 seconds likely representing combination of completed infarct and ischemia = 0 mL.      3.  Mismatched volume likely representing ischemic brain/penumbra = None      4.  Please note that the cerebral perfusion was performed on the limited brain tissue around the basal ganglia region. Infarct/ischemia outside the CT perfusion sections can be missed in this study.      CT-HEAD W/O   Final Result         1.  No acute intracranial abnormality.         MR-BRAIN-W/O    (Results Pending)   EC-ECHOCARDIOGRAM COMPLETE W/O CONT    (Results Pending)       X-Ray:  I have personally reviewed the images and compared with prior images.  EKG:  I have personally reviewed the images and compared with prior images.    Assessment/Plan:  Justification for Admission Status  I anticipate this patient is appropriate for observation status at this time because stroke rule out requiring MRI and further monitoring    Patient will need a Telemetry bed on MEDICAL " service .  The need is secondary to stroke rule out.    * Vertigo- (present on admission)  Assessment & Plan  Patient presents with 2-hour long history of vertigo-like symptoms, including dizziness and room spinning.  States that it is associated with headache along with nausea and vomiting.  Symptoms have since resolved.  CTA head and neck unremarkable.  CT head negative.  CT perfusion study negative.  Follow-up MRI brain to rule out stroke  Meclizine prn ordered    Neurology consulted by ERP. Appreciate recommendations:   Every 4 hours neurochecks  Systolic blood pressure goal is normotension, with systolic between 100-130/60 8/80.  Follow-up MRI brain without contrast  Telemetry monitoring  Follow-up TTE with bubble study  ASA 81 mg PO q day and Atorvastatin 80 mg PO q HS. Check lipid panel.  Goal LDL less than 70  BG management - follow up A1c  PT/OT  SCDs    Hypertensive urgency- (present on admission)  Assessment & Plan  Patient presents with systolic blood pressure at approximately 200.  It is now improved to approximately 140.    Avoid over correcting greater than 25%  Holding off on IV or p.o. meds at this time  Resume home dose lisinopril in the morning  Day team to monitor and reassess patient's blood pressure and home meds.    Nausea & vomiting  Assessment & Plan  Continue with IV and p.o. antiemetics    DM (diabetes mellitus) (HCC)- (present on admission)  Assessment & Plan  Insulin sliding scale, hypoglycemia protocol, diabetic diet        VTE prophylaxis: SCDs/TEDs

## 2024-02-08 NOTE — ASSESSMENT & PLAN NOTE
Patient presents with systolic blood pressure at approximately 200.  It is now improved to approximately 140.    Avoid over correcting greater than 25%  Holding off on IV or p.o. meds at this time  Resume home dose lisinopril in the morning  Day team to monitor and reassess patient's blood pressure and home meds.

## 2024-02-08 NOTE — DISCHARGE PLANNING
Patient rolled back to observation per attending   MD determination (Dr. Tono Groves) and UR committee MD secondary review   (Dr. Letitia Biswas). Condition code 44 completed.

## 2024-02-08 NOTE — ED NOTES
BS report from Meagan RUSSO and Elise RN  Pt resting in NAD, VSS w/ BP improvement and running Q15 minutes  Pt on BP monitoring at this time for HTN, no oxygen and no isolation   Pt is A&O x4, uses a purewick, ambulation difficult d/t vertigo  Call light in reach  Fall risk precautions in place

## 2024-02-08 NOTE — ED NOTES
"Medication history reviewed with [facility] [phone] & patient at bedside.   Med rec is complete  Allergies reviewed.   Patient has not had any outpatient antibiotics in the last 30 days.   Anticoagulants : No  Patient states she has not taken medications \"in a couple of months\", she states she ran out of her meds.  Vlad Wirght, PhT          "

## 2024-04-21 DIAGNOSIS — I10 ESSENTIAL HYPERTENSION, BENIGN: ICD-10-CM

## 2024-04-22 RX ORDER — LISINOPRIL 10 MG/1
10 TABLET ORAL DAILY
Qty: 30 TABLET | Refills: 2 | Status: SHIPPED | OUTPATIENT
Start: 2024-04-22

## 2024-04-22 NOTE — TELEPHONE ENCOUNTER
Received request via: Pharmacy    Was the patient seen in the last year in this department? Yes    Does the patient have an active prescription (recently filled or refills available) for medication(s) requested? No    Pharmacy Name: [facility]/pharmacy #[id number]     Does the patient have long term Plus and need 100 day supply (blood pressure, diabetes and cholesterol meds only)? Patient does not have SCP

## 2024-07-12 ENCOUNTER — APPOINTMENT (OUTPATIENT)
Dept: MEDICAL GROUP | Facility: IMAGING CENTER | Age: 59
End: 2024-07-12
Payer: COMMERCIAL

## 2024-07-12 ENCOUNTER — HOSPITAL ENCOUNTER (OUTPATIENT)
Dept: RADIOLOGY | Facility: MEDICAL CENTER | Age: 59
End: 2024-07-12
Attending: PHYSICIAN ASSISTANT
Payer: COMMERCIAL

## 2024-07-12 ENCOUNTER — HOSPITAL ENCOUNTER (OUTPATIENT)
Facility: MEDICAL CENTER | Age: 59
End: 2024-07-12
Attending: PHYSICIAN ASSISTANT
Payer: COMMERCIAL

## 2024-07-12 VITALS
OXYGEN SATURATION: 100 % | RESPIRATION RATE: 18 BRPM | TEMPERATURE: 98.3 F | DIASTOLIC BLOOD PRESSURE: 74 MMHG | SYSTOLIC BLOOD PRESSURE: 136 MMHG | HEIGHT: 65 IN | BODY MASS INDEX: 22.99 KG/M2 | WEIGHT: 138 LBS | HEART RATE: 74 BPM

## 2024-07-12 DIAGNOSIS — E11.9 TYPE 2 DIABETES MELLITUS WITHOUT COMPLICATION, WITHOUT LONG-TERM CURRENT USE OF INSULIN (HCC): ICD-10-CM

## 2024-07-12 DIAGNOSIS — I10 ESSENTIAL HYPERTENSION, BENIGN: ICD-10-CM

## 2024-07-12 DIAGNOSIS — I70.0 AORTIC ATHEROSCLEROSIS (HCC): ICD-10-CM

## 2024-07-12 DIAGNOSIS — Z00.00 WELLNESS EXAMINATION: ICD-10-CM

## 2024-07-12 DIAGNOSIS — Z12.31 ENCOUNTER FOR SCREENING MAMMOGRAM FOR BREAST CANCER: ICD-10-CM

## 2024-07-12 DIAGNOSIS — Z13.21 ENCOUNTER FOR VITAMIN DEFICIENCY SCREENING: ICD-10-CM

## 2024-07-12 DIAGNOSIS — Z23 NEED FOR VACCINATION: ICD-10-CM

## 2024-07-12 DIAGNOSIS — M27.9 LESION OF MANDIBLE: ICD-10-CM

## 2024-07-12 DIAGNOSIS — Z13.29 SCREENING FOR THYROID DISORDER: ICD-10-CM

## 2024-07-12 DIAGNOSIS — E55.9 VITAMIN D DEFICIENCY: ICD-10-CM

## 2024-07-12 DIAGNOSIS — R09.A2 FOREIGN BODY SENSATION IN THROAT: ICD-10-CM

## 2024-07-12 DIAGNOSIS — R91.8 PULMONARY INFILTRATES: ICD-10-CM

## 2024-07-12 DIAGNOSIS — E78.5 DYSLIPIDEMIA: ICD-10-CM

## 2024-07-12 DIAGNOSIS — G31.9 CEREBRAL ATROPHY, MILD (HCC): ICD-10-CM

## 2024-07-12 PROBLEM — R73.03 PREDIABETES: Status: RESOLVED | Noted: 2022-05-13 | Resolved: 2024-07-12

## 2024-07-12 PROBLEM — R10.10 PAIN OF UPPER ABDOMEN: Status: RESOLVED | Noted: 2023-06-20 | Resolved: 2024-07-12

## 2024-07-12 PROBLEM — R19.7 DIARRHEA: Status: RESOLVED | Noted: 2023-08-15 | Resolved: 2024-07-12

## 2024-07-12 PROBLEM — H81.393 PERIPHERAL VERTIGO OF BOTH EARS: Status: RESOLVED | Noted: 2024-02-07 | Resolved: 2024-07-12

## 2024-07-12 LAB
CREAT UR-MCNC: 41.95 MG/DL
MICROALBUMIN UR-MCNC: <1.2 MG/DL
MICROALBUMIN/CREAT UR: NORMAL MG/G (ref 0–30)

## 2024-07-12 PROCEDURE — 99396 PREV VISIT EST AGE 40-64: CPT | Mod: 25 | Performed by: PHYSICIAN ASSISTANT

## 2024-07-12 PROCEDURE — 82043 UR ALBUMIN QUANTITATIVE: CPT

## 2024-07-12 PROCEDURE — 82570 ASSAY OF URINE CREATININE: CPT

## 2024-07-12 PROCEDURE — 71046 X-RAY EXAM CHEST 2 VIEWS: CPT

## 2024-07-12 PROCEDURE — 90471 IMMUNIZATION ADMIN: CPT | Performed by: PHYSICIAN ASSISTANT

## 2024-07-12 PROCEDURE — 90746 HEPB VACCINE 3 DOSE ADULT IM: CPT | Performed by: PHYSICIAN ASSISTANT

## 2024-07-12 PROCEDURE — 99214 OFFICE O/P EST MOD 30 MIN: CPT | Mod: 25 | Performed by: PHYSICIAN ASSISTANT

## 2024-07-12 ASSESSMENT — FIBROSIS 4 INDEX: FIB4 SCORE: 0.95

## 2024-07-12 ASSESSMENT — PATIENT HEALTH QUESTIONNAIRE - PHQ9: CLINICAL INTERPRETATION OF PHQ2 SCORE: 0

## 2024-07-23 ENCOUNTER — HOSPITAL ENCOUNTER (OUTPATIENT)
Dept: RADIOLOGY | Facility: MEDICAL CENTER | Age: 59
End: 2024-07-23
Attending: PHYSICIAN ASSISTANT
Payer: COMMERCIAL

## 2024-07-23 DIAGNOSIS — Z12.31 ENCOUNTER FOR SCREENING MAMMOGRAM FOR BREAST CANCER: ICD-10-CM

## 2024-07-23 PROCEDURE — 77063 BREAST TOMOSYNTHESIS BI: CPT

## 2024-08-13 ENCOUNTER — HOSPITAL ENCOUNTER (OUTPATIENT)
Dept: RADIOLOGY | Facility: MEDICAL CENTER | Age: 59
End: 2024-08-13
Attending: PHYSICIAN ASSISTANT
Payer: COMMERCIAL

## 2024-08-13 DIAGNOSIS — M27.9 LESION OF MANDIBLE: ICD-10-CM

## 2024-08-13 PROCEDURE — 70487 CT MAXILLOFACIAL W/DYE: CPT

## 2024-08-20 DIAGNOSIS — I10 ESSENTIAL HYPERTENSION, BENIGN: ICD-10-CM

## 2024-08-20 RX ORDER — LISINOPRIL 10 MG/1
10 TABLET ORAL DAILY
Qty: 90 TABLET | Refills: 2 | Status: SHIPPED | OUTPATIENT
Start: 2024-08-20

## 2024-08-20 NOTE — TELEPHONE ENCOUNTER
Received request via: Pharmacy    Was the patient seen in the last year in this department? Yes    Does the patient have an active prescription (recently filled or refills available) for medication(s) requested? No    Pharmacy Name: [facility] #[id number]    Does the patient have FPC Plus and need 100-day supply? (This applies to ALL medications) Patient does not have SCP

## 2024-08-22 DIAGNOSIS — E78.5 DYSLIPIDEMIA: ICD-10-CM

## 2024-08-22 RX ORDER — ROSUVASTATIN CALCIUM 5 MG/1
5 TABLET, COATED ORAL EVERY EVENING
Qty: 30 TABLET | Refills: 11 | Status: SHIPPED | OUTPATIENT
Start: 2024-08-22

## 2024-08-22 NOTE — TELEPHONE ENCOUNTER
Received request via: Pharmacy    Was the patient seen in the last year in this department? Yes    Does the patient have an active prescription (recently filled or refills available) for medication(s) requested? No    Pharmacy Name: [facility]/pharmacy #[id number]     Does the patient have snf Plus and need 100-day supply? (This applies to ALL medications) Patient does not have SCP

## 2024-08-23 ENCOUNTER — APPOINTMENT (OUTPATIENT)
Dept: MEDICAL GROUP | Facility: IMAGING CENTER | Age: 59
End: 2024-08-23
Payer: COMMERCIAL

## 2024-08-24 ENCOUNTER — HOSPITAL ENCOUNTER (OUTPATIENT)
Dept: LAB | Facility: MEDICAL CENTER | Age: 59
End: 2024-08-24
Attending: PHYSICIAN ASSISTANT
Payer: COMMERCIAL

## 2024-08-24 DIAGNOSIS — E11.9 TYPE 2 DIABETES MELLITUS WITHOUT COMPLICATION, WITHOUT LONG-TERM CURRENT USE OF INSULIN (HCC): ICD-10-CM

## 2024-08-24 DIAGNOSIS — E55.9 VITAMIN D DEFICIENCY: ICD-10-CM

## 2024-08-24 DIAGNOSIS — Z13.29 SCREENING FOR THYROID DISORDER: ICD-10-CM

## 2024-08-24 DIAGNOSIS — Z13.21 ENCOUNTER FOR VITAMIN DEFICIENCY SCREENING: ICD-10-CM

## 2024-08-24 DIAGNOSIS — E78.5 DYSLIPIDEMIA: ICD-10-CM

## 2024-08-24 LAB
25(OH)D3 SERPL-MCNC: 20 NG/ML (ref 30–100)
ALBUMIN SERPL BCP-MCNC: 4 G/DL (ref 3.2–4.9)
ALBUMIN/GLOB SERPL: 1.3 G/DL
ALP SERPL-CCNC: 71 U/L (ref 30–99)
ALT SERPL-CCNC: 11 U/L (ref 2–50)
ANION GAP SERPL CALC-SCNC: 12 MMOL/L (ref 7–16)
AST SERPL-CCNC: 19 U/L (ref 12–45)
BILIRUB SERPL-MCNC: 0.4 MG/DL (ref 0.1–1.5)
BUN SERPL-MCNC: 12 MG/DL (ref 8–22)
CALCIUM ALBUM COR SERPL-MCNC: 9.2 MG/DL (ref 8.5–10.5)
CALCIUM SERPL-MCNC: 9.2 MG/DL (ref 8.5–10.5)
CHLORIDE SERPL-SCNC: 106 MMOL/L (ref 96–112)
CHOLEST SERPL-MCNC: 189 MG/DL (ref 100–199)
CO2 SERPL-SCNC: 23 MMOL/L (ref 20–33)
CREAT SERPL-MCNC: 0.78 MG/DL (ref 0.5–1.4)
EST. AVERAGE GLUCOSE BLD GHB EST-MCNC: 128 MG/DL
GFR SERPLBLD CREATININE-BSD FMLA CKD-EPI: 88 ML/MIN/1.73 M 2
GLOBULIN SER CALC-MCNC: 3.1 G/DL (ref 1.9–3.5)
GLUCOSE SERPL-MCNC: 105 MG/DL (ref 65–99)
HBA1C MFR BLD: 6.1 % (ref 4–5.6)
HDLC SERPL-MCNC: 43 MG/DL
LDLC SERPL CALC-MCNC: 117 MG/DL
POTASSIUM SERPL-SCNC: 3.6 MMOL/L (ref 3.6–5.5)
PROT SERPL-MCNC: 7.1 G/DL (ref 6–8.2)
SODIUM SERPL-SCNC: 141 MMOL/L (ref 135–145)
TRIGL SERPL-MCNC: 145 MG/DL (ref 0–149)
TSH SERPL DL<=0.005 MIU/L-ACNC: 4.09 UIU/ML (ref 0.38–5.33)
VIT B12 SERPL-MCNC: 589 PG/ML (ref 211–911)

## 2024-08-24 PROCEDURE — 80061 LIPID PANEL: CPT

## 2024-08-24 PROCEDURE — 82306 VITAMIN D 25 HYDROXY: CPT

## 2024-08-24 PROCEDURE — 82607 VITAMIN B-12: CPT

## 2024-08-24 PROCEDURE — 36415 COLL VENOUS BLD VENIPUNCTURE: CPT

## 2024-08-24 PROCEDURE — 80053 COMPREHEN METABOLIC PANEL: CPT

## 2024-08-24 PROCEDURE — 84443 ASSAY THYROID STIM HORMONE: CPT

## 2024-08-24 PROCEDURE — 83036 HEMOGLOBIN GLYCOSYLATED A1C: CPT

## 2024-08-29 ENCOUNTER — HOSPITAL ENCOUNTER (EMERGENCY)
Facility: MEDICAL CENTER | Age: 59
End: 2024-08-29
Attending: EMERGENCY MEDICINE
Payer: COMMERCIAL

## 2024-08-29 ENCOUNTER — APPOINTMENT (OUTPATIENT)
Dept: RADIOLOGY | Facility: MEDICAL CENTER | Age: 59
End: 2024-08-29
Attending: EMERGENCY MEDICINE
Payer: COMMERCIAL

## 2024-08-29 VITALS
TEMPERATURE: 97 F | HEART RATE: 74 BPM | RESPIRATION RATE: 20 BRPM | DIASTOLIC BLOOD PRESSURE: 64 MMHG | HEIGHT: 65 IN | WEIGHT: 136.69 LBS | BODY MASS INDEX: 22.77 KG/M2 | OXYGEN SATURATION: 94 % | SYSTOLIC BLOOD PRESSURE: 142 MMHG

## 2024-08-29 DIAGNOSIS — R53.83 FATIGUE, UNSPECIFIED TYPE: ICD-10-CM

## 2024-08-29 DIAGNOSIS — R42 DIZZINESS: ICD-10-CM

## 2024-08-29 DIAGNOSIS — N39.0 URINARY TRACT INFECTION WITHOUT HEMATURIA, SITE UNSPECIFIED: ICD-10-CM

## 2024-08-29 LAB
ALBUMIN SERPL BCP-MCNC: 3.9 G/DL (ref 3.2–4.9)
ALBUMIN/GLOB SERPL: 1.1 G/DL
ALP SERPL-CCNC: 85 U/L (ref 30–99)
ALT SERPL-CCNC: 29 U/L (ref 2–50)
ANION GAP SERPL CALC-SCNC: 12 MMOL/L (ref 7–16)
APPEARANCE UR: ABNORMAL
AST SERPL-CCNC: 32 U/L (ref 12–45)
BACTERIA #/AREA URNS HPF: NEGATIVE /HPF
BASOPHILS # BLD AUTO: 0.2 % (ref 0–1.8)
BASOPHILS # BLD: 0.02 K/UL (ref 0–0.12)
BILIRUB SERPL-MCNC: 0.7 MG/DL (ref 0.1–1.5)
BILIRUB UR QL STRIP.AUTO: NEGATIVE
BUN SERPL-MCNC: 18 MG/DL (ref 8–22)
CALCIUM ALBUM COR SERPL-MCNC: 8.9 MG/DL (ref 8.5–10.5)
CALCIUM SERPL-MCNC: 8.8 MG/DL (ref 8.5–10.5)
CHLORIDE SERPL-SCNC: 105 MMOL/L (ref 96–112)
CO2 SERPL-SCNC: 22 MMOL/L (ref 20–33)
COLOR UR: ABNORMAL
CREAT SERPL-MCNC: 1.01 MG/DL (ref 0.5–1.4)
EKG IMPRESSION: NORMAL
EOSINOPHIL # BLD AUTO: 0.02 K/UL (ref 0–0.51)
EOSINOPHIL NFR BLD: 0.2 % (ref 0–6.9)
EPI CELLS #/AREA URNS HPF: ABNORMAL /HPF
ERYTHROCYTE [DISTWIDTH] IN BLOOD BY AUTOMATED COUNT: 41.7 FL (ref 35.9–50)
GFR SERPLBLD CREATININE-BSD FMLA CKD-EPI: 65 ML/MIN/1.73 M 2
GLOBULIN SER CALC-MCNC: 3.5 G/DL (ref 1.9–3.5)
GLUCOSE SERPL-MCNC: 153 MG/DL (ref 65–99)
GLUCOSE UR STRIP.AUTO-MCNC: NEGATIVE MG/DL
HCT VFR BLD AUTO: 43.4 % (ref 37–47)
HGB BLD-MCNC: 14.2 G/DL (ref 12–16)
HYALINE CASTS #/AREA URNS LPF: ABNORMAL /LPF
IMM GRANULOCYTES # BLD AUTO: 0.03 K/UL (ref 0–0.11)
IMM GRANULOCYTES NFR BLD AUTO: 0.3 % (ref 0–0.9)
KETONES UR STRIP.AUTO-MCNC: ABNORMAL MG/DL
LEUKOCYTE ESTERASE UR QL STRIP.AUTO: ABNORMAL
LYMPHOCYTES # BLD AUTO: 1.29 K/UL (ref 1–4.8)
LYMPHOCYTES NFR BLD: 14.6 % (ref 22–41)
MCH RBC QN AUTO: 28.5 PG (ref 27–33)
MCHC RBC AUTO-ENTMCNC: 32.7 G/DL (ref 32.2–35.5)
MCV RBC AUTO: 87 FL (ref 81.4–97.8)
MICRO URNS: ABNORMAL
MONOCYTES # BLD AUTO: 0.64 K/UL (ref 0–0.85)
MONOCYTES NFR BLD AUTO: 7.2 % (ref 0–13.4)
MUCOUS THREADS #/AREA URNS HPF: ABNORMAL /HPF
NEUTROPHILS # BLD AUTO: 6.83 K/UL (ref 1.82–7.42)
NEUTROPHILS NFR BLD: 77.5 % (ref 44–72)
NITRITE UR QL STRIP.AUTO: NEGATIVE
NRBC # BLD AUTO: 0 K/UL
NRBC BLD-RTO: 0 /100 WBC (ref 0–0.2)
PH UR STRIP.AUTO: 5.5 [PH] (ref 5–8)
PLATELET # BLD AUTO: 217 K/UL (ref 164–446)
PMV BLD AUTO: 9.1 FL (ref 9–12.9)
POTASSIUM SERPL-SCNC: 3.3 MMOL/L (ref 3.6–5.5)
PROT SERPL-MCNC: 7.4 G/DL (ref 6–8.2)
PROT UR QL STRIP: 30 MG/DL
RBC # BLD AUTO: 4.99 M/UL (ref 4.2–5.4)
RBC # URNS HPF: ABNORMAL /HPF
RBC UR QL AUTO: ABNORMAL
SODIUM SERPL-SCNC: 139 MMOL/L (ref 135–145)
SP GR UR STRIP.AUTO: 1.02
TROPONIN T SERPL-MCNC: <6 NG/L (ref 6–19)
UROBILINOGEN UR STRIP.AUTO-MCNC: 1 MG/DL
WBC # BLD AUTO: 8.8 K/UL (ref 4.8–10.8)
WBC #/AREA URNS HPF: ABNORMAL /HPF

## 2024-08-29 PROCEDURE — 71045 X-RAY EXAM CHEST 1 VIEW: CPT

## 2024-08-29 PROCEDURE — 85025 COMPLETE CBC W/AUTO DIFF WBC: CPT

## 2024-08-29 PROCEDURE — 93005 ELECTROCARDIOGRAM TRACING: CPT

## 2024-08-29 PROCEDURE — 93005 ELECTROCARDIOGRAM TRACING: CPT | Performed by: EMERGENCY MEDICINE

## 2024-08-29 PROCEDURE — 36415 COLL VENOUS BLD VENIPUNCTURE: CPT

## 2024-08-29 PROCEDURE — 700111 HCHG RX REV CODE 636 W/ 250 OVERRIDE (IP): Mod: JZ | Performed by: EMERGENCY MEDICINE

## 2024-08-29 PROCEDURE — 87086 URINE CULTURE/COLONY COUNT: CPT

## 2024-08-29 PROCEDURE — 84484 ASSAY OF TROPONIN QUANT: CPT

## 2024-08-29 PROCEDURE — 87077 CULTURE AEROBIC IDENTIFY: CPT | Mod: 91

## 2024-08-29 PROCEDURE — 81001 URINALYSIS AUTO W/SCOPE: CPT

## 2024-08-29 PROCEDURE — 96372 THER/PROPH/DIAG INJ SC/IM: CPT

## 2024-08-29 PROCEDURE — 700101 HCHG RX REV CODE 250: Performed by: EMERGENCY MEDICINE

## 2024-08-29 PROCEDURE — 99284 EMERGENCY DEPT VISIT MOD MDM: CPT

## 2024-08-29 PROCEDURE — 80053 COMPREHEN METABOLIC PANEL: CPT

## 2024-08-29 RX ORDER — CEFDINIR 300 MG/1
300 CAPSULE ORAL 2 TIMES DAILY
Qty: 14 CAPSULE | Refills: 0 | Status: ACTIVE | OUTPATIENT
Start: 2024-08-29 | End: 2024-09-05

## 2024-08-29 RX ORDER — CEFTRIAXONE 1 G/1
1000 INJECTION, POWDER, FOR SOLUTION INTRAMUSCULAR; INTRAVENOUS ONCE
Status: DISCONTINUED | OUTPATIENT
Start: 2024-08-29 | End: 2024-08-29

## 2024-08-29 RX ORDER — CEFTRIAXONE 1 G/1
1000 INJECTION, POWDER, FOR SOLUTION INTRAMUSCULAR; INTRAVENOUS ONCE
Status: COMPLETED | OUTPATIENT
Start: 2024-08-29 | End: 2024-08-29

## 2024-08-29 RX ADMIN — CEFTRIAXONE SODIUM 1000 MG: 1 INJECTION, POWDER, FOR SOLUTION INTRAMUSCULAR; INTRAVENOUS at 14:28

## 2024-08-29 RX ADMIN — LIDOCAINE HYDROCHLORIDE 2.1 ML: 10 INJECTION, SOLUTION EPIDURAL; INFILTRATION; INTRACAUDAL; PERINEURAL at 14:29

## 2024-08-29 ASSESSMENT — FIBROSIS 4 INDEX: FIB4 SCORE: 1.08

## 2024-08-29 NOTE — DISCHARGE INSTRUCTIONS
See your doctor for recheck if not better in 1 week, return to the Emergency Department if worse or for any concerns

## 2024-08-29 NOTE — ED PROVIDER NOTES
ED Provider Note    CHIEF COMPLAINT  Chief Complaint   Patient presents with    Dizziness    Lightheadedness    Headache    Nausea       EXTERNAL RECORDS REVIEWED  PDMP no prescriptions for narcotics or sedatives    HPI/ROS  LIMITATION TO HISTORY   Select: : None  OUTSIDE HISTORIAN(S):  Family patient sisters at the bedside for discussion of her history and symptoms, plan for workup in the ER    Rosario Imeldaapellanes Enerio is a 58 y.o. female who presents with multiple complaints.  Today felt lightheaded and at one point for like she might pass out.  She states she has these episodes daily, worse when she stands up, over the past several years.  Her sister at the bedside stated that she has been more tired than usual.  No fever, no flank pain.  No acute chest pain.  Patient complains of the nausea without vomiting.  She feels lightheaded although states this has been at least over the last year, is worse with standing and she correlates this with starting her medicine for blood pressure probable.  No dysuria, no sore throat, no rhinorrhea, no abdominal pain.    PAST MEDICAL HISTORY   has a past medical history of Diabetes (HCC), Heart murmur, Hepatitis, acute (6/20/2023), Hypertension, and Palpitations.    SURGICAL HISTORY  patient denies any surgical history    FAMILY HISTORY  Family History   Problem Relation Age of Onset    Heart Disease Mother     Heart Disease Father     Heart Disease Sister         pacemaker    Stroke Sister     Ovarian Cancer Sister     Heart Disease Brother         transplant       SOCIAL HISTORY  Social History     Tobacco Use    Smoking status: Never    Smokeless tobacco: Never   Vaping Use    Vaping status: Never Used   Substance and Sexual Activity    Alcohol use: Never    Drug use: Never    Sexual activity: Not on file       CURRENT MEDICATIONS  Home Medications       Reviewed by Estefania Pat R.N. (Registered Nurse) on 08/29/24 at 1224  Med List Status: Partial     Medication  "Last Dose Status   lisinopril (PRINIVIL) 10 MG Tab  Active   omeprazole (PRILOSEC) 40 MG delayed-release capsule  Active   rosuvastatin (CRESTOR) 5 MG Tab  Active                  Audit from Redirected Encounters    **Home medications have not yet been reviewed for this encounter**         ALLERGIES  No Known Allergies    PHYSICAL EXAM  VITAL SIGNS: /60   Pulse 86   Temp 37.1 °C (98.7 °F) (Temporal)   Resp 18   Ht 1.651 m (5' 5\")   Wt 62 kg (136 lb 11 oz)   SpO2 97%   BMI 22.75 kg/m²    Constitutional: Well-nourished no acute distress  Cardiac: Regular rate, regular rhythm, no murmur  Respiratory: Clear lung sounds good air movement  GI: Abdomen is soft, nontender, no guarding  Skin: No peripheral edema, no abnormal bruising  Neurologic: Sensation normal, strength normal, speech clear  Psychiatric: Normal mood  Eyes: No nystagmus.  Pupils are equal  ENT: No facial trauma, mucous membranes moist      EKG/LABS  Results for orders placed or performed during the hospital encounter of 08/29/24   CBC with Differential   Result Value Ref Range    WBC 8.8 4.8 - 10.8 K/uL    RBC 4.99 4.20 - 5.40 M/uL    Hemoglobin 14.2 12.0 - 16.0 g/dL    Hematocrit 43.4 37.0 - 47.0 %    MCV 87.0 81.4 - 97.8 fL    MCH 28.5 27.0 - 33.0 pg    MCHC 32.7 32.2 - 35.5 g/dL    RDW 41.7 35.9 - 50.0 fL    Platelet Count 217 164 - 446 K/uL    MPV 9.1 9.0 - 12.9 fL    Neutrophils-Polys 77.50 (H) 44.00 - 72.00 %    Lymphocytes 14.60 (L) 22.00 - 41.00 %    Monocytes 7.20 0.00 - 13.40 %    Eosinophils 0.20 0.00 - 6.90 %    Basophils 0.20 0.00 - 1.80 %    Immature Granulocytes 0.30 0.00 - 0.90 %    Nucleated RBC 0.00 0.00 - 0.20 /100 WBC    Neutrophils (Absolute) 6.83 1.82 - 7.42 K/uL    Lymphs (Absolute) 1.29 1.00 - 4.80 K/uL    Monos (Absolute) 0.64 0.00 - 0.85 K/uL    Eos (Absolute) 0.02 0.00 - 0.51 K/uL    Baso (Absolute) 0.02 0.00 - 0.12 K/uL    Immature Granulocytes (abs) 0.03 0.00 - 0.11 K/uL    NRBC (Absolute) 0.00 K/uL   Complete " Metabolic Panel (CMP)   Result Value Ref Range    Sodium 139 135 - 145 mmol/L    Potassium 3.3 (L) 3.6 - 5.5 mmol/L    Chloride 105 96 - 112 mmol/L    Co2 22 20 - 33 mmol/L    Anion Gap 12.0 7.0 - 16.0    Glucose 153 (H) 65 - 99 mg/dL    Bun 18 8 - 22 mg/dL    Creatinine 1.01 0.50 - 1.40 mg/dL    Calcium 8.8 8.5 - 10.5 mg/dL    Correct Calcium 8.9 8.5 - 10.5 mg/dL    AST(SGOT) 32 12 - 45 U/L    ALT(SGPT) 29 2 - 50 U/L    Alkaline Phosphatase 85 30 - 99 U/L    Total Bilirubin 0.7 0.1 - 1.5 mg/dL    Albumin 3.9 3.2 - 4.9 g/dL    Total Protein 7.4 6.0 - 8.2 g/dL    Globulin 3.5 1.9 - 3.5 g/dL    A-G Ratio 1.1 g/dL   Troponins NOW   Result Value Ref Range    Troponin T <6 6 - 19 ng/L   URINALYSIS (UA)    Specimen: Urine   Result Value Ref Range    Color DK Yellow     Character Cloudy (A)     Specific Gravity 1.023 <1.035    Ph 5.5 5.0 - 8.0    Glucose Negative Negative mg/dL    Ketones Trace (A) Negative mg/dL    Protein 30 (A) Negative mg/dL    Bilirubin Negative Negative    Urobilinogen, Urine 1.0 Negative    Nitrite Negative Negative    Leukocyte Esterase Large (A) Negative    Occult Blood Moderate (A) Negative    Micro Urine Req Microscopic    ESTIMATED GFR   Result Value Ref Range    GFR (CKD-EPI) 65 >60 mL/min/1.73 m 2   URINE MICROSCOPIC (W/UA)   Result Value Ref Range    WBC Packed (A) /hpf    RBC 2-5 (A) /hpf    Bacteria Negative None /hpf    Epithelial Cells Few /hpf    Mucous Threads Few /hpf    Hyaline Cast 0-2 /lpf   EKG   Result Value Ref Range    Report       [facility] Emergency Dept.    Test Date:  2024  Pt Name:    JOSE LUIS SAAVEDRA               Department: ER  MRN:        [id number]                      Room:  Gender:     Female                       Technician: [id number]  :        [id number]                   Requested By:ER TRIAGE PROTOCOL  Order #:    [id number]                    Samantha MD: HECTOR CHAUDHARI MD    Measurements  Intervals                                 Axis  Rate:       87                           P:          60  SC:         153                          QRS:        31  QRSD:       134                          T:          14  QT:         384  QTc:        462    Interpretive Statements  Sinus rhythm  Probable left atrial enlargement  Right bundle branch block  Compared to ECG 02/07/2024 21:01:57  No significant changes  Electronically Signed On 08- 12:57:03 PDT by HECTOR CHAUDHARI MD         I have independently interpreted this EKG    RADIOLOGY/PROCEDURES   I have independently interpreted the diagnostic imaging associated with this visit and am waiting the final reading from the radiologist.   My preliminary interpretation is as follows: Chest x-ray shows no evidence of pneumonia    Radiologist interpretation:  DX-CHEST-PORTABLE (1 VIEW)   Final Result      No acute cardiopulmonary abnormality.          COURSE & MEDICAL DECISION MAKING    ASSESSMENT, COURSE AND PLAN  Care Narrative: Patient with multiple chronic symptoms, lightheadedness upon standing.  It is possibly related to side effects of blood pressure medication.  Here, vital signs are stable.  She does not have vertigo.  She has been more fatigued than usual, likely secondary to urinary tract infection.  Urinalysis showed packed white blood cells, large leukocyte esterase.  She is started on IV Rocephin and will continue on oral Omnicef.  There is no evidence of sepsis, her vital signs are normal, she is afebrile, normal white blood cell count.  Renal function is normal.  Patient is advised to follow-up with her primary care doctor for recheck soon as possible, to return to the emerged department if worse or for any concerns.  Cardiac workup showed negative EKG, normal troponin, weakness does not appear to be related to cardiac issues at this time.  Patient has agreed to return to the Emergency Department should symptoms worsen.        DISPOSITION AND DISCUSSIONS    Escalation of care  considered, and ultimately not performed:acute inpatient care management, however at this time, the patient is most appropriate for outpatient management    Barriers to care at this time, including but not limited to:  None .     Decision tools and prescription drugs considered including, but not limited to: Medication modification no change in her usual medications, she is to follow-up with her primary care doctor for recheck and discussion of any adjustments if needed .    FINAL DIAGNOSIS  1. Urinary tract infection without hematuria, site unspecified    2. Fatigue, unspecified type    3. Dizziness         Electronically signed by: Bernabe Moreira M.D., 8/29/2024 1:35 PM

## 2024-08-29 NOTE — ED TRIAGE NOTES
Pt amb to triage.  Chief Complaint   Patient presents with    Dizziness    Lightheadedness    Headache    Nausea     Symptoms x1d.  EKG complete.

## 2024-08-29 NOTE — ED NOTES
All discharge instructions given to pt.  Pt verbalized understanding of all discharge instructions. . All questions answered. All personal belongings sent with pt. Pt ambulatory to [facility].

## 2024-08-30 LAB
BACTERIA UR CULT: NORMAL
SIGNIFICANT IND 70042: NORMAL
SITE SITE: NORMAL
SOURCE SOURCE: NORMAL

## 2024-09-01 LAB
BACTERIA UR CULT: NORMAL
SIGNIFICANT IND 70042: NORMAL
SITE SITE: NORMAL
SOURCE SOURCE: NORMAL

## 2025-02-19 ENCOUNTER — HOSPITAL ENCOUNTER (EMERGENCY)
Facility: MEDICAL CENTER | Age: 60
End: 2025-02-20
Attending: STUDENT IN AN ORGANIZED HEALTH CARE EDUCATION/TRAINING PROGRAM
Payer: COMMERCIAL

## 2025-02-19 ENCOUNTER — APPOINTMENT (OUTPATIENT)
Dept: RADIOLOGY | Facility: MEDICAL CENTER | Age: 60
End: 2025-02-19
Attending: STUDENT IN AN ORGANIZED HEALTH CARE EDUCATION/TRAINING PROGRAM
Payer: COMMERCIAL

## 2025-02-19 DIAGNOSIS — R20.2 TINGLING: Primary | ICD-10-CM

## 2025-02-19 DIAGNOSIS — R03.0 ELEVATED BLOOD PRESSURE READING: ICD-10-CM

## 2025-02-19 LAB
ALBUMIN SERPL BCP-MCNC: 4.1 G/DL (ref 3.2–4.9)
ALBUMIN/GLOB SERPL: 1.2 G/DL
ALP SERPL-CCNC: 65 U/L (ref 30–99)
ALT SERPL-CCNC: 19 U/L (ref 2–50)
ANION GAP SERPL CALC-SCNC: 10 MMOL/L (ref 7–16)
AST SERPL-CCNC: 22 U/L (ref 12–45)
BASOPHILS # BLD AUTO: 0.6 % (ref 0–1.8)
BASOPHILS # BLD: 0.04 K/UL (ref 0–0.12)
BILIRUB SERPL-MCNC: 0.3 MG/DL (ref 0.1–1.5)
BUN SERPL-MCNC: 13 MG/DL (ref 8–22)
CALCIUM ALBUM COR SERPL-MCNC: 9.2 MG/DL (ref 8.5–10.5)
CALCIUM SERPL-MCNC: 9.3 MG/DL (ref 8.5–10.5)
CHLORIDE SERPL-SCNC: 106 MMOL/L (ref 96–112)
CO2 SERPL-SCNC: 24 MMOL/L (ref 20–33)
CREAT SERPL-MCNC: 0.9 MG/DL (ref 0.5–1.4)
EOSINOPHIL # BLD AUTO: 0.23 K/UL (ref 0–0.51)
EOSINOPHIL NFR BLD: 3.3 % (ref 0–6.9)
ERYTHROCYTE [DISTWIDTH] IN BLOOD BY AUTOMATED COUNT: 40.2 FL (ref 35.9–50)
GFR SERPLBLD CREATININE-BSD FMLA CKD-EPI: 74 ML/MIN/1.73 M 2
GLOBULIN SER CALC-MCNC: 3.4 G/DL (ref 1.9–3.5)
GLUCOSE SERPL-MCNC: 130 MG/DL (ref 65–99)
HCT VFR BLD AUTO: 43.9 % (ref 37–47)
HGB BLD-MCNC: 14.8 G/DL (ref 12–16)
IMM GRANULOCYTES # BLD AUTO: 0.02 K/UL (ref 0–0.11)
IMM GRANULOCYTES NFR BLD AUTO: 0.3 % (ref 0–0.9)
LYMPHOCYTES # BLD AUTO: 2.5 K/UL (ref 1–4.8)
LYMPHOCYTES NFR BLD: 35.5 % (ref 22–41)
MCH RBC QN AUTO: 29.2 PG (ref 27–33)
MCHC RBC AUTO-ENTMCNC: 33.7 G/DL (ref 32.2–35.5)
MCV RBC AUTO: 86.8 FL (ref 81.4–97.8)
MONOCYTES # BLD AUTO: 0.48 K/UL (ref 0–0.85)
MONOCYTES NFR BLD AUTO: 6.8 % (ref 0–13.4)
NEUTROPHILS # BLD AUTO: 3.78 K/UL (ref 1.82–7.42)
NEUTROPHILS NFR BLD: 53.5 % (ref 44–72)
NRBC # BLD AUTO: 0 K/UL
NRBC BLD-RTO: 0 /100 WBC (ref 0–0.2)
PLATELET # BLD AUTO: 268 K/UL (ref 164–446)
PMV BLD AUTO: 9.5 FL (ref 9–12.9)
POTASSIUM SERPL-SCNC: 3.7 MMOL/L (ref 3.6–5.5)
PROT SERPL-MCNC: 7.5 G/DL (ref 6–8.2)
RBC # BLD AUTO: 5.06 M/UL (ref 4.2–5.4)
SODIUM SERPL-SCNC: 140 MMOL/L (ref 135–145)
TROPONIN T SERPL-MCNC: 9 NG/L (ref 6–19)
WBC # BLD AUTO: 7.1 K/UL (ref 4.8–10.8)

## 2025-02-19 PROCEDURE — 84484 ASSAY OF TROPONIN QUANT: CPT | Mod: 91

## 2025-02-19 PROCEDURE — 70450 CT HEAD/BRAIN W/O DYE: CPT

## 2025-02-19 PROCEDURE — 36415 COLL VENOUS BLD VENIPUNCTURE: CPT

## 2025-02-19 PROCEDURE — A9270 NON-COVERED ITEM OR SERVICE: HCPCS | Performed by: STUDENT IN AN ORGANIZED HEALTH CARE EDUCATION/TRAINING PROGRAM

## 2025-02-19 PROCEDURE — 700105 HCHG RX REV CODE 258: Performed by: STUDENT IN AN ORGANIZED HEALTH CARE EDUCATION/TRAINING PROGRAM

## 2025-02-19 PROCEDURE — 93005 ELECTROCARDIOGRAM TRACING: CPT | Mod: TC

## 2025-02-19 PROCEDURE — 85025 COMPLETE CBC W/AUTO DIFF WBC: CPT

## 2025-02-19 PROCEDURE — 71045 X-RAY EXAM CHEST 1 VIEW: CPT

## 2025-02-19 PROCEDURE — 700102 HCHG RX REV CODE 250 W/ 637 OVERRIDE(OP): Performed by: STUDENT IN AN ORGANIZED HEALTH CARE EDUCATION/TRAINING PROGRAM

## 2025-02-19 PROCEDURE — 99285 EMERGENCY DEPT VISIT HI MDM: CPT

## 2025-02-19 PROCEDURE — 93005 ELECTROCARDIOGRAM TRACING: CPT | Mod: TC | Performed by: STUDENT IN AN ORGANIZED HEALTH CARE EDUCATION/TRAINING PROGRAM

## 2025-02-19 PROCEDURE — 80053 COMPREHEN METABOLIC PANEL: CPT

## 2025-02-19 RX ORDER — SODIUM CHLORIDE, SODIUM LACTATE, POTASSIUM CHLORIDE, CALCIUM CHLORIDE 600; 310; 30; 20 MG/100ML; MG/100ML; MG/100ML; MG/100ML
1000 INJECTION, SOLUTION INTRAVENOUS ONCE
Status: COMPLETED | OUTPATIENT
Start: 2025-02-19 | End: 2025-02-19

## 2025-02-19 RX ORDER — LISINOPRIL 10 MG/1
10 TABLET ORAL ONCE
Status: COMPLETED | OUTPATIENT
Start: 2025-02-19 | End: 2025-02-19

## 2025-02-19 RX ADMIN — LISINOPRIL 10 MG: 10 TABLET ORAL at 22:31

## 2025-02-19 RX ADMIN — SODIUM CHLORIDE, POTASSIUM CHLORIDE, SODIUM LACTATE AND CALCIUM CHLORIDE 1000 ML: 600; 310; 30; 20 INJECTION, SOLUTION INTRAVENOUS at 21:15

## 2025-02-19 ASSESSMENT — FIBROSIS 4 INDEX: FIB4 SCORE: 1.59

## 2025-02-19 NOTE — Clinical Note
Jennifer Saleh was seen and treated in our emergency department on 2/19/2025.  She may return to work on 02/24/2025.       If you have any questions or concerns, please don't hesitate to call.      Gaviota Marquez M.D.

## 2025-02-20 VITALS
SYSTOLIC BLOOD PRESSURE: 189 MMHG | OXYGEN SATURATION: 98 % | TEMPERATURE: 97.5 F | RESPIRATION RATE: 19 BRPM | DIASTOLIC BLOOD PRESSURE: 81 MMHG | HEART RATE: 78 BPM | BODY MASS INDEX: 24.16 KG/M2 | HEIGHT: 65 IN | WEIGHT: 145 LBS

## 2025-02-20 LAB — TROPONIN T SERPL-MCNC: 13 NG/L (ref 6–19)

## 2025-02-20 NOTE — DISCHARGE INSTRUCTIONS
You are seen the emergency room for evaluation of full body tingling.  Your blood pressure was noted to be elevated here.  Please buy blood pressure cuff and check your blood pressure daily to keep a log to show your primary care doctor to see if they want to change any of your medications.  Otherwise the CAT scan of your head shows no bleeding in your brain.  Your lab test showed no evidence of heart attack.  Please return to emergency room for any numbness, tingling, difficulty speaking, severe headache, chest pain or any other concerns.

## 2025-02-20 NOTE — DISCHARGE PLANNING
[facility] provided pt with cab voucher #[id number] to go to home address [facility] [place] [address].

## 2025-02-20 NOTE — ED PROVIDER NOTES
ED Provider Note    CHIEF COMPLAINT  Chief Complaint   Patient presents with    Dizziness     Pt states she has felt dizzy and tingling all day, states about an hour ago was on her lunch break and she felt increased lightheaded, dizzy and tingling all over her body and felt like she could not stand.      Tingling       EXTERNAL RECORDS REVIEWED  Outpatient Notes patient was seen by primary care doctor for annual exam July 2024 and she has history of hypertension and is on lisinopril 10 mg    HPI/ROS  LIMITATION TO HISTORY   Select: : None  OUTSIDE HISTORIAN(S):  None    Rosario Imeldaapellanes Enerio is a 59 y.o. female who presents for evaluation of full body tingling that started while she was at work today.  She was standing making sandwiches.  She developed near syncope.  She had no symptoms that were unilateral.  She has no headache.  She has no chest pain, abdominal pain, nausea, vomiting.  She states that now her symptoms are better.  She last took her lisinopril earlier this morning. She denies visual changes.     PAST MEDICAL HISTORY   has a past medical history of Diabetes (HCC), Heart murmur, Hepatitis, acute (6/20/2023), Hypertension, and Palpitations.    SURGICAL HISTORY  patient denies any surgical history    FAMILY HISTORY  Family History   Problem Relation Age of Onset    Heart Disease Mother     Heart Disease Father     Heart Disease Sister         pacemaker    Stroke Sister     Ovarian Cancer Sister     Heart Disease Brother         transplant       SOCIAL HISTORY  Social History     Tobacco Use    Smoking status: Never    Smokeless tobacco: Never   Vaping Use    Vaping status: Never Used   Substance and Sexual Activity    Alcohol use: Never    Drug use: Never    Sexual activity: Not on file       CURRENT MEDICATIONS  Home Medications       Reviewed by Felicitas Schultz R.N. (Registered Nurse) on 02/19/25 at 2034  Med List Status: Not Addressed     Medication Last Dose Status   lisinopril  "(PRINIVIL) 10 MG Tab  Active   omeprazole (PRILOSEC) 40 MG delayed-release capsule  Active   rosuvastatin (CRESTOR) 5 MG Tab  Active                    ALLERGIES  No Known Allergies    PHYSICAL EXAM  VITAL SIGNS: BP (!) 231/93   Pulse 83   Resp 16   Ht 1.651 m (5' 5\")   Wt 65.8 kg (145 lb)   SpO2 97%   BMI 24.13 kg/m²      Constitutional: Well developed, Well nourished, No acute distress, Non-toxic appearance.   HEENT: Normocephalic, Atraumatic,  external ears normal, pharynx pink,  Mucous  Membranes moist, No rhinorrhea or mucosal edema  Cardiovascular: Regular Rate and Rhythm, No murmurs,  rubs, or gallops.   Thorax & Lungs: Lungs clear to auscultation bilaterally, No respiratory distress, No wheezes, rhales or rhonchi, No chest wall tenderness.   Abdomen:  Soft, non tender, non distended,  No pulsatile masses., no rebound guarding or peritoneal signs.   Skin: Warm, Dry, No erythema, No rash,   Back:  No CVA tenderness,  No spinal tenderness, bony crepitance step offs or instability.   Extremities: Equal, intact distal pulses, No cyanosis, clubbing or edema,  No tenderness.   Musculoskeletal: Good range of motion in all major joints. No tenderness to palpation or major deformities noted.   Neurologic: Alert and oriented, Symmetric smile, eyes shut tight bilaterally, forehead wrinkles bilaterally, sensation intact to light touch bilateral face, tongue midline, head turn and shoulder shrug with full strength. Hearing intact grossly bilaterally. 5/5 strength shoulder, elbow  b/l. 5/5 strength hip, knee, ankle b/l   SILT biceps, forearms, hands, SILT thighs, shins mid foot   NO pronator drift, negative romberg, no aphasia, no dysarthria, no gaze preference or visual deficit       EKG/LABS  Results for orders placed or performed during the hospital encounter of 02/19/25   EKG    Collection Time: 02/19/25  8:32 PM   Result Value Ref Range    Report       [facility] Emergency Dept.    Test " Date:  2025  Pt Name:    JOSE LUIS SAAVEDRA               Department: ER  MRN:        [id number]                      Room:       BL 17  Gender:     Female                       Technician: [id number]  :        1965                   Requested By:ER TRIAGE PROTOCOL  Order #:    [id number]                    Reading MD:    Measurements  Intervals                                Axis  Rate:       79                           P:          53  CA:         152                          QRS:        34  QRSD:       129                          T:          41  QT:         404  QTc:        464    Interpretive Statements  Sinus rhythm  Right bundle branch block  Compared to ECG 2024 12:11:01  No significant changes     CBC WITH DIFFERENTIAL    Collection Time: 25  8:39 PM   Result Value Ref Range    WBC 7.1 4.8 - 10.8 K/uL    RBC 5.06 4.20 - 5.40 M/uL    Hemoglobin 14.8 12.0 - 16.0 g/dL    Hematocrit 43.9 37.0 - 47.0 %    MCV 86.8 81.4 - 97.8 fL    MCH 29.2 27.0 - 33.0 pg    MCHC 33.7 32.2 - 35.5 g/dL    RDW 40.2 35.9 - 50.0 fL    Platelet Count 268 164 - 446 K/uL    MPV 9.5 9.0 - 12.9 fL    Neutrophils-Polys 53.50 44.00 - 72.00 %    Lymphocytes 35.50 22.00 - 41.00 %    Monocytes 6.80 0.00 - 13.40 %    Eosinophils 3.30 0.00 - 6.90 %    Basophils 0.60 0.00 - 1.80 %    Immature Granulocytes 0.30 0.00 - 0.90 %    Nucleated RBC 0.00 0.00 - 0.20 /100 WBC    Neutrophils (Absolute) 3.78 1.82 - 7.42 K/uL    Lymphs (Absolute) 2.50 1.00 - 4.80 K/uL    Monos (Absolute) 0.48 0.00 - 0.85 K/uL    Eos (Absolute) 0.23 0.00 - 0.51 K/uL    Baso (Absolute) 0.04 0.00 - 0.12 K/uL    Immature Granulocytes (abs) 0.02 0.00 - 0.11 K/uL    NRBC (Absolute) 0.00 K/uL   COMP METABOLIC PANEL    Collection Time: 25  8:39 PM   Result Value Ref Range    Sodium 140 135 - 145 mmol/L    Potassium 3.7 3.6 - 5.5 mmol/L    Chloride 106 96 - 112 mmol/L    Co2 24 20 - 33 mmol/L    Anion Gap 10.0 7.0 - 16.0    Glucose 130 (H) 65 - 99 mg/dL    Bun 13  8 - 22 mg/dL    Creatinine 0.90 0.50 - 1.40 mg/dL    Calcium 9.3 8.5 - 10.5 mg/dL    Correct Calcium 9.2 8.5 - 10.5 mg/dL    AST(SGOT) 22 12 - 45 U/L    ALT(SGPT) 19 2 - 50 U/L    Alkaline Phosphatase 65 30 - 99 U/L    Total Bilirubin 0.3 0.1 - 1.5 mg/dL    Albumin 4.1 3.2 - 4.9 g/dL    Total Protein 7.5 6.0 - 8.2 g/dL    Globulin 3.4 1.9 - 3.5 g/dL    A-G Ratio 1.2 g/dL   TROPONIN    Collection Time: 02/19/25  8:39 PM   Result Value Ref Range    Troponin T 9 6 - 19 ng/L   ESTIMATED GFR    Collection Time: 02/19/25  8:39 PM   Result Value Ref Range    GFR (CKD-EPI) 74 >60 mL/min/1.73 m 2   TROPONIN    Collection Time: 02/19/25 11:35 PM   Result Value Ref Range    Troponin T 13 6 - 19 ng/L       I have independently interpreted this EKG    RADIOLOGY/PROCEDURES   I have independently interpreted the diagnostic imaging associated with this visit and am waiting the final reading from the radiologist.   My preliminary interpretation is as follows: no ICH    Radiologist interpretation:  CT-HEAD W/O   Final Result         1.  No acute intracranial abnormality.   2.  Atherosclerosis.               DX-CHEST-PORTABLE (1 VIEW)   Final Result         1.  No acute cardiopulmonary disease.   2.  Atherosclerosis          COURSE & MEDICAL DECISION MAKING    ASSESSMENT, COURSE AND PLAN  Care Narrative:   This is a 59-year-old female with history of hypertension who is presenting for evaluation of full body tingling that started earlier today when she was standing making a sandwich at work.  She had near syncopal episode.  She did not actually pass out.  EMS was called.  She was found to be hypertensive.  Patient has history of prior and she is on lisinopril.  EKG demonstrates no arrhythmia, ectopy, acute ischemic changes.  Labs are obtained there is no leukocytosis, she is not anemic.  Electrolytes within normal limits.  Kidney function is normal.  She has no chest pain, troponins are flat at 9 to 13.  I did consider CVA however she  describes full body tingling.  There is no unilateral deficit.  She has normal neurologic exam here.  CT head was obtained although she has no headache and demonstrates no ICH.  Chest x-ray with no evidence of pulmonary edema.    Patient was given a dose of her lisinopril.  Her blood pressure did improve to 161/74.  She has been asymptomatic her entire ER stay.  At this point, I do think the patient is stable for discharge home.  I did advise her to check her blood pressure at home to keep a log to schedule an appointment with her PCP in 2 weeks to see if there is any titration of her blood pressure medications necessary.  Patient is agreeable to discharge plan.  Advised to return for any new or worsening symptoms.              ADDITIONAL PROBLEMS MANAGED  None    DISPOSITION AND DISCUSSIONS  I have discussed management of the patient with the following physicians and JOE's:  None    Discussion of management with other [facility] or appropriate source(s): None     Escalation of care considered, and ultimately not performed:acute inpatient care management, however at this time, the patient is most appropriate for outpatient management given she has been asymptomatic the entire ER stay    Barriers to care at this time, including but not limited to:  None .     Decision tools and prescription drugs considered including, but not limited to:  None .     The patient will return for new or worsening symptoms and is stable at the time of discharge.    The patient is referred to a primary physician for blood pressure management, diabetic screening, and for all other preventative health concerns.      DISPOSITION:  Patient will be discharged home in stable condition.    FOLLOW UP:  SHAAN LesterC.  [address] [id number] [phone]          [facility], Emergency Dept  [address] [id number] [phone]          OUTPATIENT MEDICATIONS:  Discharge Medication List as of  2/20/2025  1:32 AM            FINAL DIAGNOSIS  1. Tingling Acute   2. Elevated blood pressure reading Acute        Electronically signed by: Gaviota Marquez M.D., 2/19/2025 8:36 PM       Additional Progress Note...

## 2025-02-20 NOTE — ED NOTES
Break RN: Pt discharged to home. Pt was given follow up instructions. Pt verbalized understanding of all instructions for discharge. Aox4   Pt changing in room. Will be wheeled out in wheelchair w/ tech

## 2025-02-20 NOTE — ED TRIAGE NOTES
Rosario Imeldaapellanes Enerio  59 y.o. female    Chief Complaint   Patient presents with    Dizziness     Pt states she has felt dizzy and tingling all day, states about an hour ago was on her lunch break and she felt increased lightheaded, dizzy and tingling all over her body and felt like she could not stand.      Tingling     Pt BIBA for above complaint. Pt states similar event happened last year when her BP was high, pt states took her BP meds this morning. . Pt A&Ox4. Pt in gown and on monitor. EKG done.ERP to see.    Vitals:    02/19/25 2033   BP: (!) 231/93   Pulse: 83   Resp: 16   SpO2: 97%

## 2025-02-25 LAB — EKG IMPRESSION: NORMAL

## 2025-03-12 ENCOUNTER — HOSPITAL ENCOUNTER (OUTPATIENT)
Facility: MEDICAL CENTER | Age: 60
End: 2025-03-12
Payer: COMMERCIAL

## 2025-03-12 ENCOUNTER — OFFICE VISIT (OUTPATIENT)
Dept: MEDICAL GROUP | Facility: IMAGING CENTER | Age: 60
End: 2025-03-12
Payer: COMMERCIAL

## 2025-03-12 ENCOUNTER — RESULTS FOLLOW-UP (OUTPATIENT)
Dept: MEDICAL GROUP | Facility: IMAGING CENTER | Age: 60
End: 2025-03-12

## 2025-03-12 VITALS
TEMPERATURE: 98 F | SYSTOLIC BLOOD PRESSURE: 150 MMHG | HEIGHT: 65 IN | RESPIRATION RATE: 16 BRPM | WEIGHT: 144.3 LBS | OXYGEN SATURATION: 98 % | BODY MASS INDEX: 24.04 KG/M2 | DIASTOLIC BLOOD PRESSURE: 80 MMHG | HEART RATE: 88 BPM

## 2025-03-12 DIAGNOSIS — E11.9 TYPE 2 DIABETES MELLITUS WITHOUT COMPLICATION, WITHOUT LONG-TERM CURRENT USE OF INSULIN (HCC): ICD-10-CM

## 2025-03-12 DIAGNOSIS — E78.5 DYSLIPIDEMIA: ICD-10-CM

## 2025-03-12 DIAGNOSIS — E55.9 VITAMIN D DEFICIENCY: ICD-10-CM

## 2025-03-12 DIAGNOSIS — I70.0 AORTIC ATHEROSCLEROSIS (HCC): ICD-10-CM

## 2025-03-12 DIAGNOSIS — I10 ESSENTIAL HYPERTENSION, BENIGN: ICD-10-CM

## 2025-03-12 LAB
CREAT UR-MCNC: 131 MG/DL
HBA1C MFR BLD: 6.3 % (ref ?–5.8)
MICROALBUMIN UR-MCNC: <1.2 MG/DL
MICROALBUMIN/CREAT UR: NORMAL MG/G (ref 0–30)
POCT INT CON NEG: NEGATIVE
POCT INT CON POS: POSITIVE

## 2025-03-12 PROCEDURE — 3077F SYST BP >= 140 MM HG: CPT

## 2025-03-12 PROCEDURE — 99214 OFFICE O/P EST MOD 30 MIN: CPT

## 2025-03-12 PROCEDURE — 83036 HEMOGLOBIN GLYCOSYLATED A1C: CPT

## 2025-03-12 PROCEDURE — 1126F AMNT PAIN NOTED NONE PRSNT: CPT

## 2025-03-12 PROCEDURE — 3079F DIAST BP 80-89 MM HG: CPT

## 2025-03-12 PROCEDURE — 82570 ASSAY OF URINE CREATININE: CPT

## 2025-03-12 PROCEDURE — 82043 UR ALBUMIN QUANTITATIVE: CPT

## 2025-03-12 RX ORDER — LISINOPRIL 20 MG/1
20 TABLET ORAL DAILY
Qty: 100 TABLET | Refills: 3 | Status: SHIPPED | OUTPATIENT
Start: 2025-03-12 | End: 2026-04-16

## 2025-03-12 ASSESSMENT — PATIENT HEALTH QUESTIONNAIRE - PHQ9
8. MOVING OR SPEAKING SO SLOWLY THAT OTHER PEOPLE COULD HAVE NOTICED. OR THE OPPOSITE, BEING SO FIGETY OR RESTLESS THAT YOU HAVE BEEN MOVING AROUND A LOT MORE THAN USUAL: NOT AT ALL
7. TROUBLE CONCENTRATING ON THINGS, SUCH AS READING THE NEWSPAPER OR WATCHING TELEVISION: NOT AT ALL
6. FEELING BAD ABOUT YOURSELF - OR THAT YOU ARE A FAILURE OR HAVE LET YOURSELF OR YOUR FAMILY DOWN: NOT AL ALL
4. FEELING TIRED OR HAVING LITTLE ENERGY: NOT AT ALL
3. TROUBLE FALLING OR STAYING ASLEEP OR SLEEPING TOO MUCH: NOT AT ALL
SUM OF ALL RESPONSES TO PHQ QUESTIONS 1-9: 0
1. LITTLE INTEREST OR PLEASURE IN DOING THINGS: NOT AT ALL
2. FEELING DOWN, DEPRESSED, IRRITABLE, OR HOPELESS: NOT AT ALL
9. THOUGHTS THAT YOU WOULD BE BETTER OFF DEAD, OR OF HURTING YOURSELF: NOT AT ALL
5. POOR APPETITE OR OVEREATING: NOT AT ALL
SUM OF ALL RESPONSES TO PHQ9 QUESTIONS 1 AND 2: 0

## 2025-03-12 ASSESSMENT — PAIN SCALES - GENERAL: PAINLEVEL_OUTOF10: NO PAIN

## 2025-03-12 ASSESSMENT — FIBROSIS 4 INDEX: FIB4 SCORE: 1.09

## 2025-03-12 NOTE — PROGRESS NOTES
Subjective:     CC:   Chief Complaint   Patient presents with    [facility]     02/19/2025, high blood pressure       HPI:   Jennifer with hx of hypertension, type 2 diabetes, atherosclerosis, lipidemia, right bundle branch block, presents today for ED visit follow up:    Patient presented to ED c/o dizziness and generalized body tingling.  ED work up:  Patient was hypertensive /93   EKG demonstrates no arrhythmia, ectopy, acute ischemic changes. Labs are obtained there is no leukocytosis, she is not anemic. Electrolytes within normal limits. Kidney function is normal. She has no chest pain, troponins are flat at 9 to 13. I did consider CVA however she describes full body tingling. There is no unilateral deficit. She has normal neurologic exam here. CT head was obtained although she has no headache and demonstrates no ICH. Chest x-ray with no evidence of pulmonary edema.     She continues to take lisinopril 10 mg daily for HTN. Home BP since discharge remains 140s/80s. Denies blurry vision, syncope, chest pain.    Type 2 DM- not on medication. Diet controlled. Due to repeat A1c.   Past Medical History:   Diagnosis Date    Diabetes (HCC)     Heart murmur     Hepatitis, acute 6/20/2023    Hypertension     Palpitations      Family History   Problem Relation Age of Onset    Heart Disease Mother     Heart Disease Father     Heart Disease Sister         pacemaker    Stroke Sister     Ovarian Cancer Sister     Heart Disease Brother         transplant     History reviewed. No pertinent surgical history.  Social History     Tobacco Use    Smoking status: Never    Smokeless tobacco: Never   Vaping Use    Vaping status: Never Used   Substance Use Topics    Alcohol use: Never    Drug use: Never     Social History     Social History Narrative    From the [facility]. Moved in 1985.    , no kids.     Current Outpatient Medications Ordered in Epic   Medication Sig Dispense Refill     "lisinopril (PRINIVIL) 20 MG Tab Take 1 Tablet by mouth every day. 100 Tablet 3    rosuvastatin (CRESTOR) 5 MG Tab TAKE 1 TABLET BY MOUTH EVERY DAY IN THE EVENING 30 Tablet 11    omeprazole (PRILOSEC) 40 MG delayed-release capsule Take 40 mg by mouth every day.       No current Epic-ordered facility-administered medications on file.     Patient has no known allergies.      ROS: see hpi  Gen: no fevers/chills  Pulm: no sob, no cough  CV: no chest pain, no palpitations, no edema  GI: no nausea/vomiting, no diarrhea  Skin: no rash    Objective:   Exam:  BP (!) 150/80 (BP Location: Right arm, Patient Position: Sitting, BP Cuff Size: Adult)   Pulse 88   Temp 36.7 °C (98 °F) (Temporal)   Resp 16   Ht 1.651 m (5' 5\")   Wt 65.5 kg (144 lb 4.8 oz)   SpO2 98%   BMI 24.01 kg/m²    Body mass index is 24.01 kg/m².    Gen: Alert and oriented, No apparent distress.  HEENT: Head atraumatic, normocephalic. Pupils equal and round.  Neck: Neck is supple without lymphadenopathy.   Lungs: Normal effort, CTA bilaterally, no wheezes, rhonchi, or rales  CV: Regular rate and rhythm. No murmurs, rubs, or gallops.  ABD: +BS. Non-tender, non-distended. No rebound, rigidity, or guarding.  Ext: No clubbing, cyanosis, edema.    Assessment & Plan:     59 y.o. female with the following -   1. Essential hypertension, benign  Chronic, uncontrolled on lisinopril 10 mg daily.  BP elevated today.  Will increase lisinopril dose to 20 mg daily.  Medication administration and side effects discussed.  Instructed to monitor BP at home and keep log.  Follow-up with PCP as scheduled on 4/15.   ED symptoms discussed.    - Lipid Profile; Future  - TSH WITH REFLEX TO FT4; Future  - MICROALBUMIN CREAT RATIO URINE; Future  - lisinopril (PRINIVIL) 20 MG Tab; Take 1 Tablet by mouth every day.  Dispense: 100 Tablet; Refill: 3    2. Type 2 diabetes mellitus without complication, without long-term current use of insulin (HCC)  Chronic condition, diet controlled.  " A1c today is 6.3.  Recommend to continue with healthy lifestyle changes. Will f/u with PCP in 4 weeks.     - POCT A1C  - MICROALBUMIN CREAT RATIO URINE; Future    3. Aortic atherosclerosis (HCC)  4. Dyslipidemia  Established, on crestor. The 10-year ASCVD risk score (Antonino COOPER, et al., 2019) is: 10.7%  Will repeat labs.   I recommend healthy lifestyle changes that include diet low in saturated fat, limit simple sugars, simple carbs, food with high fructose corn syrup, and highly processed food; eat lean protein, diet high in fresh vegetables, fruits, and fiber; exercise 30 min per day 5 times a week, avoid alcohol, stop smoking, practice stress reduction techniques, and good sleep hygiene. Weight loss of 5-10% to achieve healthy BMI.    - Lipid Profile; Future  - TSH WITH REFLEX TO FT4; Future    5. Vitamin D deficiency    - VITAMIN D,25 HYDROXY (DEFICIENCY); Future      Return in about 1 month (around 4/15/2025).    SALOMÓN Calhoun   [facility]    Medical Decision Making/Course:  In the course of preparing for this visit with review of the pertinent past medical history, recent and past clinic visits, current medications, and performing chart, immunization, medical history and medication reconciliation, and in the further course of obtaining the current history pertinent to the clinic visit today, performing an exam and evaluation, ordering and independently evaluating labs, tests, and/or procedures, prescribing any recommended new medications as noted above, providing any pertinent counseling and education and recommending further coordination of care. This was discussed with patient in a shared-decision making conversation, and they understand and agreed with plan of care.     Please note that this dictation was created using voice recognition software. I have made every reasonable attempt to correct obvious errors, but I expect that there are errors of grammar and possibly content that I  did not discover before finalizing the note.

## 2025-04-14 ENCOUNTER — RESULTS FOLLOW-UP (OUTPATIENT)
Dept: MEDICAL GROUP | Facility: IMAGING CENTER | Age: 60
End: 2025-04-14
Payer: COMMERCIAL

## 2025-04-14 ENCOUNTER — HOSPITAL ENCOUNTER (OUTPATIENT)
Dept: LAB | Facility: MEDICAL CENTER | Age: 60
End: 2025-04-14
Payer: COMMERCIAL

## 2025-04-14 DIAGNOSIS — E78.5 DYSLIPIDEMIA: ICD-10-CM

## 2025-04-14 DIAGNOSIS — I70.0 AORTIC ATHEROSCLEROSIS (HCC): ICD-10-CM

## 2025-04-14 DIAGNOSIS — I10 ESSENTIAL HYPERTENSION, BENIGN: ICD-10-CM

## 2025-04-14 DIAGNOSIS — E55.9 VITAMIN D DEFICIENCY: ICD-10-CM

## 2025-04-14 LAB
25(OH)D3 SERPL-MCNC: 33 NG/ML (ref 30–100)
CHOLEST SERPL-MCNC: 246 MG/DL (ref 100–199)
HDLC SERPL-MCNC: 44 MG/DL
LDLC SERPL CALC-MCNC: 159 MG/DL
TRIGL SERPL-MCNC: 216 MG/DL (ref 0–149)
TSH SERPL DL<=0.005 MIU/L-ACNC: 1.07 UIU/ML (ref 0.38–5.33)

## 2025-04-14 PROCEDURE — 80061 LIPID PANEL: CPT

## 2025-04-14 PROCEDURE — 84443 ASSAY THYROID STIM HORMONE: CPT

## 2025-04-14 PROCEDURE — 36415 COLL VENOUS BLD VENIPUNCTURE: CPT

## 2025-04-14 PROCEDURE — 82306 VITAMIN D 25 HYDROXY: CPT

## 2025-07-24 ENCOUNTER — OFFICE VISIT (OUTPATIENT)
Dept: MEDICAL GROUP | Facility: IMAGING CENTER | Age: 60
End: 2025-07-24
Payer: COMMERCIAL

## 2025-07-24 VITALS
SYSTOLIC BLOOD PRESSURE: 120 MMHG | WEIGHT: 145 LBS | RESPIRATION RATE: 16 BRPM | TEMPERATURE: 98.5 F | HEART RATE: 85 BPM | DIASTOLIC BLOOD PRESSURE: 84 MMHG | BODY MASS INDEX: 24.16 KG/M2 | HEIGHT: 65 IN | OXYGEN SATURATION: 98 %

## 2025-07-24 DIAGNOSIS — E55.9 VITAMIN D DEFICIENCY: ICD-10-CM

## 2025-07-24 DIAGNOSIS — I10 ESSENTIAL HYPERTENSION, BENIGN: ICD-10-CM

## 2025-07-24 DIAGNOSIS — Z12.31 ENCOUNTER FOR SCREENING MAMMOGRAM FOR BREAST CANCER: ICD-10-CM

## 2025-07-24 DIAGNOSIS — Z23 NEED FOR VACCINATION: ICD-10-CM

## 2025-07-24 DIAGNOSIS — E11.9 TYPE 2 DIABETES MELLITUS WITHOUT COMPLICATION, WITHOUT LONG-TERM CURRENT USE OF INSULIN (HCC): Primary | ICD-10-CM

## 2025-07-24 DIAGNOSIS — B35.1 ONYCHOMYCOSIS: ICD-10-CM

## 2025-07-24 DIAGNOSIS — R09.82 POST-NASAL DRIP: ICD-10-CM

## 2025-07-24 DIAGNOSIS — E78.5 DYSLIPIDEMIA: ICD-10-CM

## 2025-07-24 LAB
HBA1C MFR BLD: 6.4 % (ref ?–5.8)
POCT INT CON NEG: NEGATIVE
POCT INT CON POS: POSITIVE

## 2025-07-24 PROCEDURE — 99214 OFFICE O/P EST MOD 30 MIN: CPT | Mod: 25 | Performed by: PHYSICIAN ASSISTANT

## 2025-07-24 PROCEDURE — 3074F SYST BP LT 130 MM HG: CPT | Performed by: PHYSICIAN ASSISTANT

## 2025-07-24 PROCEDURE — 3079F DIAST BP 80-89 MM HG: CPT | Performed by: PHYSICIAN ASSISTANT

## 2025-07-24 PROCEDURE — 1126F AMNT PAIN NOTED NONE PRSNT: CPT | Performed by: PHYSICIAN ASSISTANT

## 2025-07-24 PROCEDURE — 90471 IMMUNIZATION ADMIN: CPT | Performed by: PHYSICIAN ASSISTANT

## 2025-07-24 PROCEDURE — 90746 HEPB VACCINE 3 DOSE ADULT IM: CPT | Mod: JZ | Performed by: PHYSICIAN ASSISTANT

## 2025-07-24 PROCEDURE — 83036 HEMOGLOBIN GLYCOSYLATED A1C: CPT | Performed by: PHYSICIAN ASSISTANT

## 2025-07-24 RX ORDER — ERGOCALCIFEROL 1.25 MG/1
50000 CAPSULE, LIQUID FILLED ORAL
Qty: 4 CAPSULE | Refills: 0 | Status: SHIPPED | OUTPATIENT
Start: 2025-07-24

## 2025-07-24 RX ORDER — ROSUVASTATIN CALCIUM 20 MG/1
20 TABLET, COATED ORAL EVERY EVENING
Qty: 100 TABLET | Refills: 3 | Status: SHIPPED | OUTPATIENT
Start: 2025-07-24

## 2025-07-24 ASSESSMENT — PAIN SCALES - GENERAL: PAINLEVEL_OUTOF10: NO PAIN

## 2025-07-24 ASSESSMENT — FIBROSIS 4 INDEX: FIB4 SCORE: 1.09

## 2025-07-24 NOTE — ASSESSMENT & PLAN NOTE
Latest Reference Range & Units 03/12/25 09:04 07/24/25 10:31   Glycohemoglobin <=5.8 % 6.3 ! 6.4 !   !: Data is abnormal    Chronic, diet controlled.  Does not follow a balanced diet.  Compliant with ACE inhibitor and statin.  No neuropathy.  Has had eye exam over the past year, report not available for review.

## 2025-07-24 NOTE — PATIENT INSTRUCTIONS
It was a pleasure meeting with you today at [facility]!    Your medical history/records and medications were reviewed today.     UPDATE on MyChart Results: If you have blood work, and/or imaging studies, or any other test or procedure completed, you will have access to results as soon as they become available in MyChart. Recently, these results will be available for review at the same time that your provider is able to see results!    This will likely mean you will see a result before your provider has had a chance to review and discuss with you.  Some results or care notes may be hard to understand and may be serious in nature.    We look at every result and your provider will contact you to explain what they mean and discuss appropriate next steps. Please allow for at least 72 business hours for chart and result review.     We prefer that you wait for your care team to contact you with your results.  Often, your provider will discuss your results with you at your next appointment. We look forward to continuing to partner with you in your care.    Please review my practice information below:    If you have any prescription refill requests, please send them via [employer] or discuss with your provider at the start of your office visits. Please allow 3-5 business days for lab and testing review and you will be contacted via [employer] with those results, or if advised to make a follow up appointment regarding those results, then please do so.     Once resulted, your lab/test/imaging results will show up automatically in your MyChart. Please wait for my interpretation and recommendations prior to viewing your results to avoid any unnecessary confusion or misinterpretation. I will address all of the lab values that I interpret as abnormal and message you accordingly on your MyChart. I will always send you a message about your results even if they are normal. If you do not hear back from me within 5-7 business  days after completing your tests, then please send me a message on [employer] so I can obtain your results (especially if you went to an outside lab or imaging center - [facility], [facility], etc).     If you have any additional questions or concerns beyond my interpretation of your results, please make an appointment with me to discuss in further detail.    Please only use the [employer] messaging system for questions regarding your most recent appointment or if advised to use otherwise (glucose or blood pressure reporting).     If you have any new problems or concerns, you must make an appointment to discuss. This includes any referral requests, lab requests (unless advised to notify me for pre-appt labs), medication side effects, or request for medication adjustments.     Please arrive 15 minutes prior to your appointment time to complete your check-in and intake with the medical assistant.      Thank you,    Hien Frey PA-C ([place])  Physician Assistant Certified  [facility]    -----------------------------------------------------------------    Attn: Patients of [facility]:    In an effort to continue to provide excellent and efficient care to our patients, it is vital that we continue to use our resources appropriately. With that, this is a reminder that [employer] is used for prescription refill requests, test results, virtual visits, and chart review only.     Any new questions, concerns/conditions, lab/imaging requests, medication adjustments, new prescriptions, or referral requests do require an appointment (virtually or in person), unless discussed otherwise at your most recent appointment.     Thank you for your understanding,    [facility]

## 2025-07-24 NOTE — ASSESSMENT & PLAN NOTE
Patient's lisinopril dose increased last visit to 20 mg daily.  Tolerating well.  No cough, chest pain, potation's, headaches, vision changes.

## 2025-07-24 NOTE — PROGRESS NOTES
Subjective:     CC:   Chief Complaint   Patient presents with    Follow-Up     Lab results from 04/14/25    Other     Itchy throat every day x1year        HPI:   Jennifer presents today to discuss:    Type 2 diabetes mellitus without complication, without long-term current use of insulin ([facility])   Latest Reference Range & Units 03/12/25 09:04 07/24/25 10:31   Glycohemoglobin <=5.8 % 6.3 ! 6.4 !   !: Data is abnormal    Chronic, diet controlled.  Does not follow a balanced diet.  Compliant with ACE inhibitor and statin.  No neuropathy.  Has had eye exam over the past year, report not available for review.    Essential hypertension, benign  Patient's lisinopril dose increased last visit to 20 mg daily.  Tolerating well.  No cough, chest pain, potation's, headaches, vision changes.    Dyslipidemia  Chronic, uncontrolled.  On rosuvastatin 5 mg nightly.  Does not follow balanced diet.    Patient also admits to chronic runny nose and postnasal drip, not taking anything over-the-counter.    Past Medical History[1]  Family History   Problem Relation Age of Onset    Heart Disease Mother     Heart Disease Father     Heart Disease Sister         pacemaker    Stroke Sister     Ovarian Cancer Sister     Heart Disease Brother         transplant     Past Surgical History[2]  Social History[3]  Social History     Social History Narrative    From the [facility]. Moved in 1985.    , no kids.     Current Medications and Prescriptions Ordered in Epic[4]  Patient has no known allergies.    PMH/PSH/FH/Social history reviewed.  Vaccinations discussed.  Previous records and labs reviewed. Discussed age appropriate anticipatory guidance.    ROS: see hpi  Gen: no fevers/chills  Pulm: no sob, no cough  CV: no chest pain, no palpitations, no edema  GI: no nausea/vomiting, no diarrhea  Skin: no rash    Objective:   Exam:  /84 (BP Location: Left arm, Patient Position: Sitting, BP Cuff Size: Adult)   Pulse 85   Temp 36.9 °C (98.5 °F)  "(Temporal)   Resp 16   Ht 1.651 m (5' 5\")   Wt 65.8 kg (145 lb)   SpO2 98%   BMI 24.13 kg/m²    Body mass index is 24.13 kg/m².    Gen: Alert and oriented, No apparent distress.  HEENT: Head atraumatic, normocephalic. Pupils equal and round.  Bilateral nasal turbinates hypertrophied and pale with clear rhinorrhea.  Posterior pharynx with clear rhinorrhea, no erythema or exudate.  Neck: Neck is supple without lymphadenopathy.   Lungs: Normal effort, CTA bilaterally, no wheezes, rhonchi, or rales  CV: Regular rate and rhythm. No murmurs, rubs, or gallops.  ABD: +BS. Non-tender, non-distended. No rebound, rigidity, or guarding.  Ext: No clubbing, cyanosis, edema.    Monofilament testing with a 10 gram force: sensation intact: intact bilaterally  Visual Inspection: Feet without maceration, ulcers, fissures.  Thick darkened toenails bilateral feet.  Pedal pulses: intact bilaterally      Assessment & Plan:     59 y.o. female with the following -     1. Type 2 diabetes mellitus without complication, without long-term current use of insulin (HCC) (Primary)  Chronic, controlled and stable. Continue current regimen.  Will increase statin dose to 20 mg nightly.  Refer to podiatry for nail care.  Diabetes recommendations:  -Follow an 1800 calorie ADA diet and aim to get about half of your calories from carbohydrates. For an 1800 calorie diet, that would be around 150-200 grams of carbs a day. Exercise as tolerated; ideally 150 minutes of cardiovascular exercise a week, or 20 minutes a day.   -Monitor blood sugars at home and keep a log book. Check your glucose fasting every morning, before dinner, and 2 hours after dinner (or as otherwise recommended at your appointment). Call if glucose <70 or >300. Please send weekly readings of your blood glucose through [employer].  Goal readings:  Before meals (preprandial):  mg/dL  After meals (postprandial): below 180 mg/dL  -Please check your feet frequently for any open " wounds/ulcers, signs of infection, or changes in sensation/neuropathy.  -Ensure that you see your ophthalmologist for your annual eye exam to assess for diabetic retinopathy.  - Diabetic Monofilament LE Exam  - POCT  A1C  - HEMOGLOBIN A1C; Future  - Comp Metabolic Panel; Future  - Referral to Podiatry    2. Dyslipidemia  Chronic, uncontrolled.  Increase rosuvastatin to 20 mg nightly.  Repeat lipids prior to next visit.  - rosuvastatin (CRESTOR) 20 MG Tab; Take 1 Tablet by mouth every evening.  Dispense: 100 Tablet; Refill: 3  - Lipid Profile; Future    3. Essential hypertension, benign  Chronic, controlled and stable. Continue current regimen -lisinopril 20 mg daily. Recommend DASH diet, exercise as tolerated. Monitor Blood Pressure at home and report any consistent readings above >140/90. Seek medical help/ER if chest pain, palpitations, shortness of breath, headache, dizziness.     4. Onychomycosis  Chronic, uncontrolled.  Refer to podiatry for nail care.  - Referral to Podiatry    5. Post-nasal drip  Over-the-counter antihistamine and Flonase.    6. Vitamin D deficiency  - vitamin D2 (ERGOCALCIFEROL) 1.25 MG (54071 UT) Cap capsule; Take 1 Capsule by mouth every 7 days.  Dispense: 4 Capsule; Refill: 0    7. Encounter for screening mammogram for breast cancer  - MA-SCREENING MAMMO BILAT W/TOMOSYNTHESIS W/CAD; Future    8. Need for vaccination  - Hepatitis B Vaccine Adult 20+    Return in about 3 months (around 11/4/2025) for Follow-up labs/tests.    Hien Frey PA-C ([place])  Physician Assistant Certified  [facility]      Please note that this dictation was created using voice recognition software. I have made every reasonable attempt to correct obvious errors, but I expect that there are errors of grammar and possibly content that I did not discover before finalizing the note.         [1]   Past Medical History:  Diagnosis Date    Diabetes (HCC)     Heart murmur     Hepatitis, acute 6/20/2023     Hypertension     Palpitations    [2] History reviewed. No pertinent surgical history.  [3]   Social History  Tobacco Use    Smoking status: Never    Smokeless tobacco: Never   Vaping Use    Vaping status: Never Used   Substance Use Topics    Alcohol use: Never    Drug use: Never   [4]   Current Outpatient Medications Ordered in Epic   Medication Sig Dispense Refill    rosuvastatin (CRESTOR) 20 MG Tab Take 1 Tablet by mouth every evening. 100 Tablet 3    vitamin D2 (ERGOCALCIFEROL) 1.25 MG (43513 UT) Cap capsule Take 1 Capsule by mouth every 7 days. 4 Capsule 0    chlorhexidine (PERIDEX) 0.12 % Solution RINSE MOUTH WITH 15ML (1 CAPFUL) FOR 30 SECONDS IN MORNING AND EVENING AFTER BRUSHING, THEN SPIT      lisinopril (PRINIVIL) 20 MG Tab Take 1 Tablet by mouth every day. 100 Tablet 3    omeprazole (PRILOSEC) 40 MG delayed-release capsule Take 40 mg by mouth every day.       No current [facility]-ordered facility-administered medications on file.

## 2025-08-22 DIAGNOSIS — E55.9 VITAMIN D DEFICIENCY: ICD-10-CM

## 2025-08-25 RX ORDER — ERGOCALCIFEROL 1.25 MG/1
50000 CAPSULE, LIQUID FILLED ORAL
Qty: 12 CAPSULE | Refills: 1 | Status: SHIPPED | OUTPATIENT
Start: 2025-08-25